# Patient Record
Sex: FEMALE | Race: BLACK OR AFRICAN AMERICAN | NOT HISPANIC OR LATINO
[De-identification: names, ages, dates, MRNs, and addresses within clinical notes are randomized per-mention and may not be internally consistent; named-entity substitution may affect disease eponyms.]

---

## 2019-05-13 PROBLEM — Z00.00 ENCOUNTER FOR PREVENTIVE HEALTH EXAMINATION: Status: ACTIVE | Noted: 2019-05-13

## 2019-05-31 ENCOUNTER — APPOINTMENT (OUTPATIENT)
Dept: NEUROLOGY | Facility: CLINIC | Age: 84
End: 2019-05-31
Payer: MEDICARE

## 2019-05-31 VITALS
DIASTOLIC BLOOD PRESSURE: 62 MMHG | HEIGHT: 62 IN | WEIGHT: 150 LBS | BODY MASS INDEX: 27.6 KG/M2 | OXYGEN SATURATION: 99 % | HEART RATE: 93 BPM | SYSTOLIC BLOOD PRESSURE: 123 MMHG | TEMPERATURE: 98.4 F

## 2019-05-31 DIAGNOSIS — Z86.73 PERSONAL HISTORY OF TRANSIENT ISCHEMIC ATTACK (TIA), AND CEREBRAL INFARCTION W/OUT RESIDUAL DEFICITS: ICD-10-CM

## 2019-05-31 DIAGNOSIS — Z86.39 PERSONAL HISTORY OF OTHER ENDOCRINE, NUTRITIONAL AND METABOLIC DISEASE: ICD-10-CM

## 2019-05-31 DIAGNOSIS — Z78.9 OTHER SPECIFIED HEALTH STATUS: ICD-10-CM

## 2019-05-31 DIAGNOSIS — Z86.79 PERSONAL HISTORY OF OTHER DISEASES OF THE CIRCULATORY SYSTEM: ICD-10-CM

## 2019-05-31 DIAGNOSIS — Z87.898 PERSONAL HISTORY OF OTHER SPECIFIED CONDITIONS: ICD-10-CM

## 2019-05-31 DIAGNOSIS — I63.9 CEREBRAL INFARCTION, UNSPECIFIED: ICD-10-CM

## 2019-05-31 PROCEDURE — 99205 OFFICE O/P NEW HI 60 MIN: CPT

## 2019-05-31 RX ORDER — IRBESARTAN 300 MG/1
TABLET ORAL
Refills: 0 | Status: ACTIVE | COMMUNITY

## 2019-05-31 RX ORDER — MEMANTINE HYDROCHLORIDE 10 MG/1
10 TABLET, FILM COATED ORAL
Refills: 0 | Status: ACTIVE | COMMUNITY

## 2019-05-31 RX ORDER — METFORMIN HYDROCHLORIDE 500 MG/1
500 TABLET, COATED ORAL
Refills: 0 | Status: ACTIVE | COMMUNITY

## 2019-05-31 RX ORDER — GLIPIZIDE 2.5 MG/1
TABLET ORAL
Refills: 0 | Status: ACTIVE | COMMUNITY

## 2019-05-31 RX ORDER — AMLODIPINE BESYLATE 5 MG/1
TABLET ORAL
Refills: 0 | Status: ACTIVE | COMMUNITY

## 2019-05-31 RX ORDER — ASPIRIN 81 MG
81 TABLET, DELAYED RELEASE (ENTERIC COATED) ORAL
Refills: 0 | Status: ACTIVE | COMMUNITY

## 2019-05-31 NOTE — PHYSICAL EXAM
[General Appearance - Alert] : alert [General Appearance - In No Acute Distress] : in no acute distress [Person] : oriented to person [Place] : oriented to place [Registration Intact] : recent registration memory intact [Concentration Intact] : normal concentrating ability [Visual Intact] : visual attention was ~T not ~L decreased [Repeating Phrases] : no difficulty repeating a phrase [Fluency] : fluency intact [Comprehension] : comprehension intact [Vocabulary] : adequate range of vocabulary [Cranial Nerves Optic (II)] : visual acuity intact bilaterally,  visual fields full to confrontation, pupils equal round and reactive to light [Cranial Nerves Oculomotor (III)] : extraocular motion intact [Cranial Nerves Trigeminal (V)] : facial sensation intact symmetrically [Cranial Nerves Facial (VII)] : face symmetrical [Cranial Nerves Vestibulocochlear (VIII)] : hearing was intact bilaterally [Cranial Nerves Glossopharyngeal (IX)] : tongue and palate midline [Cranial Nerves Accessory (XI - Cranial And Spinal)] : head turning and shoulder shrug symmetric [Cranial Nerves Hypoglossal (XII)] : there was no tongue deviation with protrusion [Motor Tone] : muscle tone was normal in all four extremities [Motor Strength] : muscle strength was normal in all four extremities [Involuntary Movements] : no involuntary movements were seen [Sensation Tactile Decrease] : light touch was intact [Abnormal Walk] : normal gait [Limited Balance] : the patient's balance was impaired [1+] : Ankle jerk left 1+ [Full Pulse] : the pedal pulses are present [Time] : disoriented to time [Short Term Intact] : short term memory impaired [Remote Intact] : remote memory impaired [Coordination - Dysmetria Impaired Finger-to-Nose Bilateral] : not present [Coordination - Dysmetria Impaired Heel-to-Shin Bilateral] : not present [Plantar Reflex Right Only] : normal on the right [Plantar Reflex Left Only] : normal on the left [FreeTextEntry5] : fundi not visualized

## 2019-05-31 NOTE — HISTORY OF PRESENT ILLNESS
[FreeTextEntry1] : The patient is here for evaluation of a stroke. In April 2019, she was noticed by the daughter to have right-sided weakness, she tilted to the right side, and had slurred speech. She also had problems with balance when walking at that time, she was falling towards the right. She was taken to [facility] in had stroke workup at that point, records aren't available at this time.\par \par There was no dizziness, double vision, blurry vision, difficulty with swallowing or language. There was no known numbness or tingling. The patient ambulated without a cane or walker prior to this event. She was noticed to have right-sided weakness and received PT, she currently uses a walker.  As per the daughter, the patient had cerebellar stroke.  There was also concern for a seizure, details unavailable from family, and the patient was started on Keppra.  No known seizures after hospital discharge.\par \par Additionally, the patient was started on Namenda beginning in April of 2019 (prior to the stroke) she was noticed to have difficulty with memory. She commonly repeats herself and does not remember things.

## 2019-05-31 NOTE — ASSESSMENT
[FreeTextEntry1] : CVA, cerebellar stroke right sided as per family\par cw asa and statin\par BP goal of normal\par LDL goal <70\par \par Seizure, possible on Keppra, unclear\par will get EEG\par cw Keppra for now\par \par Memory problems, likely dementia AD\par will get labs for reversible causes of dementia\par cw namenda for now\par \par Family will request records from [facility] in order to make informed decision on how to treat Ms. Martinez for her stroke and presumed seizure/s.\par

## 2019-05-31 NOTE — REVIEW OF SYSTEMS
[As Noted in HPI] : as noted in HPI [Muscle Weakness] : muscle weakness [Fever] : no fever [Anxiety] : no anxiety [Eyesight Problems] : no eyesight problems [Loss Of Hearing] : no hearing loss [Chest Pain] : no chest pain [Shortness Of Breath] : no shortness of breath [Constipation] : no constipation [Incontinence] : no incontinence [Joint Pain] : no joint pain [Itching] : no itching [Easy Bleeding] : no tendency for easy bleeding

## 2019-05-31 NOTE — CONSULT LETTER
[Dear  ___] : Dear  [unfilled], [Consult Letter:] : I had the pleasure of evaluating your patient, [unfilled]. [Please see my note below.] : Please see my note below. [Consult Closing:] : Thank you very much for allowing me to participate in the care of this patient.  If you have any questions, please do not hesitate to contact me. [Sincerely,] : Sincerely, [FreeTextEntry3] : Bia Osorio MD, MPH\par

## 2019-06-27 ENCOUNTER — OUTPATIENT (OUTPATIENT)
Dept: OUTPATIENT SERVICES | Facility: HOSPITAL | Age: 84
LOS: 1 days | End: 2019-06-27
Payer: COMMERCIAL

## 2019-06-27 ENCOUNTER — RESULT CHARGE (OUTPATIENT)
Age: 84
End: 2019-06-27

## 2019-06-27 ENCOUNTER — RESULT REVIEW (OUTPATIENT)
Age: 84
End: 2019-06-27

## 2019-06-27 DIAGNOSIS — R56.9 UNSPECIFIED CONVULSIONS: ICD-10-CM

## 2019-06-27 PROCEDURE — 95957 EEG DIGITAL ANALYSIS: CPT

## 2019-06-27 PROCEDURE — 95819 EEG AWAKE AND ASLEEP: CPT

## 2019-06-27 PROCEDURE — 95819 EEG AWAKE AND ASLEEP: CPT | Mod: 26

## 2019-06-27 NOTE — EEG REPORT - NS EEG TEXT BOX
[facility]  Report of Routine EEG     [facility]: [address], [address] [place] [address], Phone: [phone]  [facility]: [phone] [place] [address], Phone: [phone]  Office: [address] [facility], [facility] [address], [place] [address], Phone: [phone]    Patient Name: WILL JOSUE    Age: 89 year  : 1926  Patient ID: -, MRN #: -, Location: -  Referring Physician: DR MILIAN  EEG #: [id number]    Study Date: 2019		    Technical Information:					  On Instrument: -  Placement and Labeling of Electrodes:  The EEG was performed utilizing 20 channels referential EEG connections (coronal over temporal over parasagittal montage) using all standard 10-20 electrode placements with EKG.  Recording was at a sampling rate of 256 samples per second per channel.  Chase and seizure detection algorithms were utilized.    History:  Routine study..Performed bedside   Patient awake and alert  HV performed shortly and photic performed'  89 Y/O female presents with Seizure  Convulsions    Medication	  Keppra (Levetiracetam)	    Study Interpretation:    Findings: The background was continuous, spontaneously variable and reactive. During wakefulness, the posterior dominant rhythm consisted of symmetric, well-modulated 9 Hz activity, with amplitude to 30 uV, that attenuated to eye opening.  Low amplitude frontal beta was noted in wakefulness.    Background Slowing:  No generalized background slowing was present.    Focal Slowing:   None were present.    Sleep Background:  Drowsiness and stage II sleep were not recorded.    Other Non-Epileptiform Findings:  None were present.    Interictal Epileptiform Activity:   None were present.    Events:  Clinical events: None recorded.  Seizures: None recorded.    Activation Procedures:   Hyperventilation was performed and did not elicit any abnormalities.    Photic stimulation was performed and did not elicit any abnormalities.      Artifacts:  Intermittent myogenic and movement artifacts were noted.    ECG:  The heart rate on single channel ECG was predominantly between 70 and 80 BPM.    EEG Summary/Classification:  Normal EEG in the awake state.    EEG Impression/Clinical Correlate:    Normal EEG study.    No epileptic pattern or seizure seen.    No events concerning for seizure were captured during this study.    A repeat routine EEG preceded by sleep deprivation, or a long-term study (ambulatory EEG or Epilepsy Monitoring Unit stay) may be considered to help capture the drowsy and asleep states.        ________________________________________    Steve Garza MD  Attending Physician, [facility]  Report of Routine EEG     [facility]: [address], [address] [place] [address], Phone: [phone]  [facility]: [phone] [place] [address], Phone: [phone]  Office: 1 [facility], [facility] [address], [place] [address], Phone: [phone]    Patient Name: WILL JOSUE    Age: 89 year  : 1926  Patient ID: -, MRN #: [id number], Location: [facility]  Referring Physician: DR MILIAN  EEG #: [id number]    Study Date: 2019		    Technical Information:					  On Instrument: -  Placement and Labeling of Electrodes:  The EEG was performed utilizing 20 channels referential EEG connections (coronal over temporal over parasagittal montage) using all standard 10-20 electrode placements with EKG.  Recording was at a sampling rate of 256 samples per second per channel.  Chase and seizure detection algorithms were utilized.    History:  Routine study..Performed bedside   Patient awake and alert  HV performed shortly and photic performed'  89 Y/O female presents with Seizure  Convulsions    Medication	  Keppra (Levetiracetam)	    Study Interpretation:    Findings: The background was continuous, spontaneously variable and reactive. During wakefulness, the posterior dominant rhythm consisted of symmetric, well-modulated 9 Hz activity, with amplitude to 30 uV, that attenuated to eye opening.  Low amplitude frontal beta was noted in wakefulness.    Background Slowing:  No generalized background slowing was present.    Focal Slowing:   None were present.    Sleep Background:  Drowsiness and stage II sleep were not recorded.    Other Non-Epileptiform Findings:  None were present.    Interictal Epileptiform Activity:   None were present.    Events:  Clinical events: None recorded.  Seizures: None recorded.    Activation Procedures:   Hyperventilation was performed and did not elicit any abnormalities.    Photic stimulation was performed and did not elicit any abnormalities.      Artifacts:  Intermittent myogenic and movement artifacts were noted.    ECG:  The heart rate on single channel ECG was predominantly between 70 and 80 BPM.    EEG Summary/Classification:  Normal EEG in the awake state.    EEG Impression/Clinical Correlate:    Normal EEG study.    No epileptic pattern or seizure seen.    No events concerning for seizure were captured during this study.    A repeat routine EEG preceded by sleep deprivation, or a long-term study (ambulatory EEG or Epilepsy Monitoring Unit stay) may be considered to help capture the drowsy and asleep states.        ________________________________________    Steve Garza MD  Attending Physician, [facility] [place]

## 2019-07-10 ENCOUNTER — APPOINTMENT (OUTPATIENT)
Dept: NEUROLOGY | Facility: CLINIC | Age: 84
End: 2019-07-10
Payer: MEDICARE

## 2019-07-10 VITALS
BODY MASS INDEX: 27.6 KG/M2 | SYSTOLIC BLOOD PRESSURE: 125 MMHG | HEIGHT: 62 IN | TEMPERATURE: 98 F | WEIGHT: 150 LBS | OXYGEN SATURATION: 98 % | HEART RATE: 87 BPM | DIASTOLIC BLOOD PRESSURE: 71 MMHG

## 2019-07-10 PROCEDURE — 99214 OFFICE O/P EST MOD 30 MIN: CPT

## 2019-07-10 RX ORDER — LEVETIRACETAM 500 MG/1
500 TABLET, FILM COATED ORAL TWICE DAILY
Qty: 60 | Refills: 2 | Status: ACTIVE | COMMUNITY

## 2019-07-10 RX ORDER — ATORVASTATIN CALCIUM 40 MG/1
40 TABLET, FILM COATED ORAL
Refills: 0 | Status: ACTIVE | COMMUNITY

## 2019-07-10 NOTE — HISTORY OF PRESENT ILLNESS
[FreeTextEntry1] : The patient is here for evaluation of a stroke. In April 2019, she was noticed by the daughter to have right-sided weakness, she tilted to the right side, and had slurred speech. She also had problems with balance when walking at that time, she was falling towards the right. She was taken to [facility] in had stroke workup at that point, records aren't available at this time.\par \par There was no dizziness, double vision, blurry vision, difficulty with swallowing or language. There was no known numbness or tingling. The patient ambulated without a cane or walker prior to this event. She was noticed to have right-sided weakness and received PT, she currently uses a walker. As per the daughter, the patient had cerebellar stroke. There was also concern for a seizure, details unavailable from family, and the patient was started on Keppra. No known seizures after hospital discharge.\par \par Additionally, the patient was started on Namenda beginning in April of 2019 (prior to the stroke) she was noticed to have difficulty with memory. She commonly repeats herself and does not remember things. \par \par No clinical change since last visit.

## 2019-07-10 NOTE — DATA REVIEWED
[de-identified] : EEG Impression/Clinical Correlate:\par \par Normal EEG study.\par \par No epileptic pattern or seizure seen.\par \par No events concerning for seizure were captured during this study.\par \par A repeat routine EEG preceded by sleep deprivation, or a long-term study (ambulatory EEG or Epilepsy Monitoring Unit stay) may be considered to help capture the drowsy and asleep states.\par  [de-identified] : hbA1C 7.4\par ldl 62

## 2019-07-10 NOTE — ASSESSMENT
[FreeTextEntry1] : CVA, cerebellar stroke right sided as per family by no records\par cw asa and statin\par BP goal of normal\par LDL at goal <70\par \par Seizure, possible on Keppra, unclear\par will get EEG\par cw Keppra for now\par \par Memory problems, likely dementia AD\par will get labs for reversible causes of dementia\par cw namenda for now\par \par Family will request records from [facility] in order to make informed decision on how to treat Ms. Martinez for her stroke and presumed seizure/s.\par  \par

## 2019-07-10 NOTE — PHYSICAL EXAM
[General Appearance - Alert] : alert [General Appearance - In No Acute Distress] : in no acute distress [Person] : oriented to person [Place] : oriented to place [Time] : oriented to time [Registration Intact] : recent registration memory intact [Concentration Intact] : normal concentrating ability [Visual Intact] : visual attention was ~T not ~L decreased [Naming Objects] : no difficulty naming common objects [Repeating Phrases] : no difficulty repeating a phrase [Fluency] : fluency intact [Comprehension] : comprehension intact [Vocabulary] : adequate range of vocabulary [Cranial Nerves Oculomotor (III)] : extraocular motion intact [Cranial Nerves Optic (II)] : visual acuity intact bilaterally,  visual fields full to confrontation, pupils equal round and reactive to light [Cranial Nerves Trigeminal (V)] : facial sensation intact symmetrically [Cranial Nerves Facial (VII)] : face symmetrical [Motor Tone] : muscle tone was normal in all four extremities [Sensation Tactile Decrease] : light touch was intact [Motor Strength] : muscle strength was normal in all four extremities [Limited Balance] : the patient's balance was impaired

## 2019-09-05 ENCOUNTER — APPOINTMENT (OUTPATIENT)
Dept: NEUROLOGY | Facility: CLINIC | Age: 84
End: 2019-09-05
Payer: MEDICARE

## 2019-09-05 VITALS
HEIGHT: 62 IN | BODY MASS INDEX: 26.87 KG/M2 | HEART RATE: 106 BPM | WEIGHT: 146 LBS | DIASTOLIC BLOOD PRESSURE: 78 MMHG | SYSTOLIC BLOOD PRESSURE: 139 MMHG | OXYGEN SATURATION: 97 %

## 2019-09-05 DIAGNOSIS — F03.90 UNSPECIFIED DEMENTIA W/OUT BEHAVIORAL DISTURBANCE: ICD-10-CM

## 2019-09-05 DIAGNOSIS — R56.9 UNSPECIFIED CONVULSIONS: ICD-10-CM

## 2019-09-05 DIAGNOSIS — I63.9 CEREBRAL INFARCTION, UNSPECIFIED: ICD-10-CM

## 2019-09-05 PROCEDURE — 99214 OFFICE O/P EST MOD 30 MIN: CPT

## 2019-09-05 NOTE — ASSESSMENT
[FreeTextEntry1] : CVA, cerebellar stroke right sided\par cw asa and statin\par BP goal of normal\par LDL at goal <70\par PT\par \par Seizure, possible on Keppra\par cw Keppra for now\par sz/fall ppx\par patient does not drive\par \par Memory problems, likely dementia AD\par cw namenda for now\par

## 2019-09-05 NOTE — HISTORY OF PRESENT ILLNESS
[FreeTextEntry1] : The patient is here for evaluation of a stroke. In April 2019, she was noticed by the daughter to have right-sided weakness, she tilted to the right side, and had slurred speech. She also had problems with balance when walking at that time, she was falling towards the right. She was taken to [facility] in had stroke workup at that point, records aren't available at this time.\par \par There was no dizziness, double vision, blurry vision, difficulty with swallowing or language. There was no known numbness or tingling. The patient ambulated without a cane or walker prior to this event. She was noticed to have right-sided weakness and received PT, she currently uses a walker. As per the daughter, the patient had cerebellar stroke. There was also concern for a seizure, details unavailable from family, and the patient was started on Keppra. No known seizures after hospital discharge.\par \par Additionally, the patient was started on Namenda beginning in April of 2019 (prior to the stroke) she was noticed to have difficulty with memory. She commonly repeats herself and does not remember things. \par \par No clinical change since last visit. \par

## 2019-09-05 NOTE — PHYSICAL EXAM
[General Appearance - Alert] : alert [Person] : oriented to person [General Appearance - In No Acute Distress] : in no acute distress [Place] : oriented to place [Registration Intact] : recent registration memory intact [Concentration Intact] : normal concentrating ability [Naming Objects] : no difficulty naming common objects [Fluency] : fluency intact [Vocabulary] : adequate range of vocabulary [Cranial Nerves Trigeminal (V)] : facial sensation intact symmetrically [Cranial Nerves Optic (II)] : visual acuity intact bilaterally,  visual fields full to confrontation, pupils equal round and reactive to light [Cranial Nerves Oculomotor (III)] : extraocular motion intact [Cranial Nerves Facial (VII)] : face symmetrical [Motor Tone] : muscle tone was normal in all four extremities [Motor Strength] : muscle strength was normal in all four extremities [Sensation Tactile Decrease] : light touch was intact [Time] : disoriented to time [Remote Intact] : remote memory impaired [Coordination - Dysmetria Impaired Finger-to-Nose Bilateral] : not present [Coordination - Dysmetria Impaired Heel-to-Shin Bilateral] : not present

## 2019-12-09 ENCOUNTER — INPATIENT (INPATIENT)
Facility: HOSPITAL | Age: 84
LOS: 14 days | Discharge: NURSING, REHAB OR LONG-TERM CARE | End: 2019-12-24
Attending: INTERNAL MEDICINE | Admitting: INTERNAL MEDICINE
Payer: COMMERCIAL

## 2019-12-09 VITALS
HEART RATE: 100 BPM | OXYGEN SATURATION: 100 % | SYSTOLIC BLOOD PRESSURE: 121 MMHG | HEIGHT: 62 IN | WEIGHT: 145.06 LBS | TEMPERATURE: 98 F | DIASTOLIC BLOOD PRESSURE: 68 MMHG | RESPIRATION RATE: 18 BRPM

## 2019-12-09 LAB
ALBUMIN SERPL ELPH-MCNC: 2.8 G/DL — LOW (ref 3.3–5)
ALP SERPL-CCNC: 103 U/L — SIGNIFICANT CHANGE UP (ref 40–120)
ALT FLD-CCNC: 23 U/L — SIGNIFICANT CHANGE UP (ref 12–78)
ANION GAP SERPL CALC-SCNC: 9 MMOL/L — SIGNIFICANT CHANGE UP (ref 5–17)
APPEARANCE UR: CLEAR — SIGNIFICANT CHANGE UP
APTT BLD: 31.3 SEC — SIGNIFICANT CHANGE UP (ref 27.5–36.3)
AST SERPL-CCNC: 26 U/L — SIGNIFICANT CHANGE UP (ref 15–37)
BACTERIA # UR AUTO: ABNORMAL
BASOPHILS # BLD AUTO: 0.02 K/UL — SIGNIFICANT CHANGE UP (ref 0–0.2)
BASOPHILS NFR BLD AUTO: 0.3 % — SIGNIFICANT CHANGE UP (ref 0–2)
BILIRUB SERPL-MCNC: 0.2 MG/DL — SIGNIFICANT CHANGE UP (ref 0.2–1.2)
BILIRUB UR-MCNC: NEGATIVE — SIGNIFICANT CHANGE UP
BUN SERPL-MCNC: 40 MG/DL — HIGH (ref 7–23)
CALCIUM SERPL-MCNC: 11.8 MG/DL — HIGH (ref 8.5–10.1)
CHLORIDE SERPL-SCNC: 107 MMOL/L — SIGNIFICANT CHANGE UP (ref 96–108)
CO2 SERPL-SCNC: 23 MMOL/L — SIGNIFICANT CHANGE UP (ref 22–31)
COLOR SPEC: YELLOW — SIGNIFICANT CHANGE UP
CREAT SERPL-MCNC: 1.58 MG/DL — HIGH (ref 0.5–1.3)
DIFF PNL FLD: ABNORMAL
EOSINOPHIL # BLD AUTO: 0.12 K/UL — SIGNIFICANT CHANGE UP (ref 0–0.5)
EOSINOPHIL NFR BLD AUTO: 1.7 % — SIGNIFICANT CHANGE UP (ref 0–6)
GLUCOSE BLDC GLUCOMTR-MCNC: 118 MG/DL — HIGH (ref 70–99)
GLUCOSE SERPL-MCNC: 103 MG/DL — HIGH (ref 70–99)
GLUCOSE UR QL: NEGATIVE MG/DL — SIGNIFICANT CHANGE UP
HCT VFR BLD CALC: 26.8 % — LOW (ref 34.5–45)
HGB BLD-MCNC: 8.6 G/DL — LOW (ref 11.5–15.5)
IMM GRANULOCYTES NFR BLD AUTO: 0.4 % — SIGNIFICANT CHANGE UP (ref 0–1.5)
INR BLD: 1.11 RATIO — SIGNIFICANT CHANGE UP (ref 0.88–1.16)
KETONES UR-MCNC: NEGATIVE — SIGNIFICANT CHANGE UP
LACTATE SERPL-SCNC: 1.1 MMOL/L — SIGNIFICANT CHANGE UP (ref 0.7–2)
LEUKOCYTE ESTERASE UR-ACNC: ABNORMAL
LYMPHOCYTES # BLD AUTO: 1.02 K/UL — SIGNIFICANT CHANGE UP (ref 1–3.3)
LYMPHOCYTES # BLD AUTO: 14.5 % — SIGNIFICANT CHANGE UP (ref 13–44)
MCHC RBC-ENTMCNC: 25.4 PG — LOW (ref 27–34)
MCHC RBC-ENTMCNC: 32.1 GM/DL — SIGNIFICANT CHANGE UP (ref 32–36)
MCV RBC AUTO: 79.1 FL — LOW (ref 80–100)
MONOCYTES # BLD AUTO: 1.06 K/UL — HIGH (ref 0–0.9)
MONOCYTES NFR BLD AUTO: 15.1 % — HIGH (ref 2–14)
NEUTROPHILS # BLD AUTO: 4.78 K/UL — SIGNIFICANT CHANGE UP (ref 1.8–7.4)
NEUTROPHILS NFR BLD AUTO: 68 % — SIGNIFICANT CHANGE UP (ref 43–77)
NITRITE UR-MCNC: NEGATIVE — SIGNIFICANT CHANGE UP
NRBC # BLD: 0 /100 WBCS — SIGNIFICANT CHANGE UP (ref 0–0)
PH UR: 7 — SIGNIFICANT CHANGE UP (ref 5–8)
PLATELET # BLD AUTO: 209 K/UL — SIGNIFICANT CHANGE UP (ref 150–400)
POTASSIUM SERPL-MCNC: 4.3 MMOL/L — SIGNIFICANT CHANGE UP (ref 3.5–5.3)
POTASSIUM SERPL-SCNC: 4.3 MMOL/L — SIGNIFICANT CHANGE UP (ref 3.5–5.3)
PROT SERPL-MCNC: 6.7 GM/DL — SIGNIFICANT CHANGE UP (ref 6–8.3)
PROT UR-MCNC: 100 MG/DL
PROTHROM AB SERPL-ACNC: 12.5 SEC — SIGNIFICANT CHANGE UP (ref 10–12.9)
RBC # BLD: 3.39 M/UL — LOW (ref 3.8–5.2)
RBC # FLD: 15.5 % — HIGH (ref 10.3–14.5)
RBC CASTS # UR COMP ASSIST: SIGNIFICANT CHANGE UP /HPF (ref 0–4)
SODIUM SERPL-SCNC: 139 MMOL/L — SIGNIFICANT CHANGE UP (ref 135–145)
SP GR SPEC: 1.01 — SIGNIFICANT CHANGE UP (ref 1.01–1.02)
TROPONIN I SERPL-MCNC: <.015 NG/ML — SIGNIFICANT CHANGE UP (ref 0.01–0.04)
UROBILINOGEN FLD QL: NEGATIVE MG/DL — SIGNIFICANT CHANGE UP
WBC # BLD: 7.03 K/UL — SIGNIFICANT CHANGE UP (ref 3.8–10.5)
WBC # FLD AUTO: 7.03 K/UL — SIGNIFICANT CHANGE UP (ref 3.8–10.5)
WBC UR QL: ABNORMAL

## 2019-12-09 PROCEDURE — 99285 EMERGENCY DEPT VISIT HI MDM: CPT

## 2019-12-09 PROCEDURE — 93010 ELECTROCARDIOGRAM REPORT: CPT

## 2019-12-09 RX ORDER — ATORVASTATIN CALCIUM 80 MG/1
1 TABLET, FILM COATED ORAL
Qty: 0 | Refills: 0 | DISCHARGE

## 2019-12-09 RX ORDER — ALLOPURINOL 300 MG
1 TABLET ORAL
Qty: 0 | Refills: 0 | DISCHARGE

## 2019-12-09 RX ORDER — MEMANTINE HYDROCHLORIDE 10 MG/1
1 TABLET ORAL
Qty: 0 | Refills: 0 | DISCHARGE

## 2019-12-09 RX ORDER — LEVETIRACETAM 250 MG/1
1 TABLET, FILM COATED ORAL
Qty: 0 | Refills: 0 | DISCHARGE

## 2019-12-09 NOTE — ED ADULT NURSE NOTE - PMH
CVA (cerebral vascular accident)  april 2019  DM (diabetes mellitus)    Gout    HTN (hypertension)    Hyperlipidemia

## 2019-12-09 NOTE — ED ADULT NURSE NOTE - CHIEF COMPLAINT QUOTE
pt with changes in mental status over the last couple of weeks. diarrhea today. family reports episodes of weakness since this morning. has been seen at [facility] x 2  over the last two weeks and discharged home. ems accucheck 102. hx. htn, dm, high cholesterol, seizures, gout

## 2019-12-09 NOTE — ED PROVIDER NOTE - OBJECTIVE STATEMENT
Pertinent PMH/PSH/FHx/SHx and Review of Systems contained within:    89yo F w PMH of HTN, DM, HL, seizure d/o, gout presents to ED for eval of increasing weakness and change in mental status over the past few weeks. Pertinent PMH/PSH/FHx/SHx and Review of Systems contained within:    89yo F w PMH of HTN, DM, HL, seizure d/o, gout presents to ED for eval of increasing weakness and change in mental status over the past few weeks.  Family states some days pt is at her baseline, then other times she is more lethargic, noted drooling.  Family states pt taken to outside hospital twice for sx, work up neg & dc home.  Family states pt has been compliant w her meds, no known grand mal seizures.  No fall/head trauma.  Pt currently denies pain.    No fever/chills, No photophobia/eye pain/changes in vision, No ear pain/sore throat/dysphagia, No chest pain/palpitations, no SOB/cough/wheeze/stridor, No abdominal pain, No neck/back pain, no rash, no changes in neurological status/function

## 2019-12-09 NOTE — ED PROVIDER NOTE - NS ED ROS FT
Patient seen and examined.  No complaints, denies VB/LOF/ reports Ctxns overnight, reports good fetal movement. Precautions for Bleeding/ ROM/ Decreased fetal movement/ Pre-E reviewed.  F/U scheduled 1 weeks     all other ROS negative except as per HPI

## 2019-12-09 NOTE — ED ADULT TRIAGE NOTE - CHIEF COMPLAINT QUOTE
pt with changes in mental status over the last couple of weeks. diarrhea today. family reports episodes of weakness since this morning. has been seen at [place] x 2  over the last two weeks and discharged home. ems accucheck 102. hx. htn, dm, high cholesterol, seizures, gout

## 2019-12-09 NOTE — ED PROVIDER NOTE - PHYSICAL EXAMINATION
Gen: Alert, NAD  Head: NC, AT, EOMI, normal lids/conjunctiva  ENT: normal hearing, patent oropharynx, MMM  Neck: supple, no tenderness/meningismus, FROM, Trachea midline  Pulm: Bilateral clear BS, normal resp effort, no wheeze/stridor/retractions  CV: RRR, no M/R/G, +dist pulses  Abd: soft, NT/ND, +BS, no guarding/rebound tenderness  Mskel: no edema/erythema/cyanosis  Skin: no rash  Neuro: no sensory/motor deficits, NIHSS 0

## 2019-12-10 DIAGNOSIS — I10 ESSENTIAL (PRIMARY) HYPERTENSION: ICD-10-CM

## 2019-12-10 DIAGNOSIS — N39.0 URINARY TRACT INFECTION, SITE NOT SPECIFIED: ICD-10-CM

## 2019-12-10 DIAGNOSIS — E86.0 DEHYDRATION: ICD-10-CM

## 2019-12-10 DIAGNOSIS — G93.41 METABOLIC ENCEPHALOPATHY: ICD-10-CM

## 2019-12-10 DIAGNOSIS — N17.9 ACUTE KIDNEY FAILURE, UNSPECIFIED: ICD-10-CM

## 2019-12-10 DIAGNOSIS — E83.52 HYPERCALCEMIA: ICD-10-CM

## 2019-12-10 LAB
24R-OH-CALCIDIOL SERPL-MCNC: 31.3 NG/ML — SIGNIFICANT CHANGE UP (ref 30–80)
ANION GAP SERPL CALC-SCNC: 12 MMOL/L — SIGNIFICANT CHANGE UP (ref 5–17)
BUN SERPL-MCNC: 34 MG/DL — HIGH (ref 7–23)
CALCIUM SERPL-MCNC: 11.8 MG/DL — HIGH (ref 8.5–10.1)
CALCIUM SERPL-MCNC: 12.2 MG/DL — HIGH (ref 8.4–10.5)
CHLORIDE SERPL-SCNC: 107 MMOL/L — SIGNIFICANT CHANGE UP (ref 96–108)
CO2 SERPL-SCNC: 22 MMOL/L — SIGNIFICANT CHANGE UP (ref 22–31)
CREAT SERPL-MCNC: 1.32 MG/DL — HIGH (ref 0.5–1.3)
GLUCOSE BLDC GLUCOMTR-MCNC: 111 MG/DL — HIGH (ref 70–99)
GLUCOSE BLDC GLUCOMTR-MCNC: 154 MG/DL — HIGH (ref 70–99)
GLUCOSE BLDC GLUCOMTR-MCNC: 213 MG/DL — HIGH (ref 70–99)
GLUCOSE SERPL-MCNC: 122 MG/DL — HIGH (ref 70–99)
PHOSPHATE SERPL-MCNC: 3.4 MG/DL — SIGNIFICANT CHANGE UP (ref 2.5–4.5)
POTASSIUM SERPL-MCNC: 3.5 MMOL/L — SIGNIFICANT CHANGE UP (ref 3.5–5.3)
POTASSIUM SERPL-SCNC: 3.5 MMOL/L — SIGNIFICANT CHANGE UP (ref 3.5–5.3)
PTH-INTACT FLD-MCNC: 4 PG/ML — LOW (ref 15–65)
SODIUM SERPL-SCNC: 141 MMOL/L — SIGNIFICANT CHANGE UP (ref 135–145)
VIT D25+D1,25 OH+D1,25 PNL SERPL-MCNC: 69.9 PG/ML — SIGNIFICANT CHANGE UP (ref 19.9–79.3)

## 2019-12-10 PROCEDURE — 12345: CPT | Mod: NC

## 2019-12-10 PROCEDURE — 99223 1ST HOSP IP/OBS HIGH 75: CPT

## 2019-12-10 PROCEDURE — 71045 X-RAY EXAM CHEST 1 VIEW: CPT | Mod: 26

## 2019-12-10 PROCEDURE — 88305 TISSUE EXAM BY PATHOLOGIST: CPT | Mod: 26

## 2019-12-10 PROCEDURE — 70450 CT HEAD/BRAIN W/O DYE: CPT | Mod: 26

## 2019-12-10 PROCEDURE — 88312 SPECIAL STAINS GROUP 1: CPT | Mod: 26

## 2019-12-10 RX ORDER — CEFTRIAXONE 500 MG/1
1000 INJECTION, POWDER, FOR SOLUTION INTRAMUSCULAR; INTRAVENOUS EVERY 24 HOURS
Refills: 0 | Status: COMPLETED | OUTPATIENT
Start: 2019-12-10 | End: 2019-12-11

## 2019-12-10 RX ORDER — AMLODIPINE BESYLATE 2.5 MG/1
2.5 TABLET ORAL DAILY
Refills: 0 | Status: DISCONTINUED | OUTPATIENT
Start: 2019-12-10 | End: 2019-12-13

## 2019-12-10 RX ORDER — HEPARIN SODIUM 5000 [USP'U]/ML
5000 INJECTION INTRAVENOUS; SUBCUTANEOUS EVERY 12 HOURS
Refills: 0 | Status: DISCONTINUED | OUTPATIENT
Start: 2019-12-10 | End: 2019-12-20

## 2019-12-10 RX ORDER — DEXTROSE 50 % IN WATER 50 %
12.5 SYRINGE (ML) INTRAVENOUS ONCE
Refills: 0 | Status: DISCONTINUED | OUTPATIENT
Start: 2019-12-10 | End: 2019-12-24

## 2019-12-10 RX ORDER — DEXTROSE 50 % IN WATER 50 %
15 SYRINGE (ML) INTRAVENOUS ONCE
Refills: 0 | Status: DISCONTINUED | OUTPATIENT
Start: 2019-12-10 | End: 2019-12-24

## 2019-12-10 RX ORDER — INSULIN LISPRO 100/ML
VIAL (ML) SUBCUTANEOUS
Refills: 0 | Status: DISCONTINUED | OUTPATIENT
Start: 2019-12-10 | End: 2019-12-24

## 2019-12-10 RX ORDER — ASPIRIN/CALCIUM CARB/MAGNESIUM 324 MG
81 TABLET ORAL DAILY
Refills: 0 | Status: DISCONTINUED | OUTPATIENT
Start: 2019-12-10 | End: 2019-12-13

## 2019-12-10 RX ORDER — SODIUM CHLORIDE 9 MG/ML
1000 INJECTION INTRAMUSCULAR; INTRAVENOUS; SUBCUTANEOUS
Refills: 0 | Status: DISCONTINUED | OUTPATIENT
Start: 2019-12-10 | End: 2019-12-13

## 2019-12-10 RX ORDER — AMLODIPINE BESYLATE 2.5 MG/1
2.5 TABLET ORAL ONCE
Refills: 0 | Status: COMPLETED | OUTPATIENT
Start: 2019-12-10 | End: 2019-12-10

## 2019-12-10 RX ORDER — CEFTRIAXONE 500 MG/1
1000 INJECTION, POWDER, FOR SOLUTION INTRAMUSCULAR; INTRAVENOUS ONCE
Refills: 0 | Status: COMPLETED | OUTPATIENT
Start: 2019-12-10 | End: 2019-12-10

## 2019-12-10 RX ORDER — METOPROLOL TARTRATE 50 MG
12.5 TABLET ORAL ONCE
Refills: 0 | Status: COMPLETED | OUTPATIENT
Start: 2019-12-10 | End: 2019-12-10

## 2019-12-10 RX ORDER — DEXTROSE 50 % IN WATER 50 %
25 SYRINGE (ML) INTRAVENOUS ONCE
Refills: 0 | Status: DISCONTINUED | OUTPATIENT
Start: 2019-12-10 | End: 2019-12-24

## 2019-12-10 RX ORDER — ACETAMINOPHEN 500 MG
650 TABLET ORAL ONCE
Refills: 0 | Status: COMPLETED | OUTPATIENT
Start: 2019-12-10 | End: 2019-12-10

## 2019-12-10 RX ORDER — SODIUM CHLORIDE 9 MG/ML
1000 INJECTION INTRAMUSCULAR; INTRAVENOUS; SUBCUTANEOUS ONCE
Refills: 0 | Status: COMPLETED | OUTPATIENT
Start: 2019-12-10 | End: 2019-12-10

## 2019-12-10 RX ORDER — GLUCAGON INJECTION, SOLUTION 0.5 MG/.1ML
1 INJECTION, SOLUTION SUBCUTANEOUS ONCE
Refills: 0 | Status: DISCONTINUED | OUTPATIENT
Start: 2019-12-10 | End: 2019-12-24

## 2019-12-10 RX ORDER — LEVETIRACETAM 250 MG/1
500 TABLET, FILM COATED ORAL
Refills: 0 | Status: DISCONTINUED | OUTPATIENT
Start: 2019-12-10 | End: 2019-12-16

## 2019-12-10 RX ORDER — SODIUM CHLORIDE 9 MG/ML
1000 INJECTION, SOLUTION INTRAVENOUS
Refills: 0 | Status: DISCONTINUED | OUTPATIENT
Start: 2019-12-10 | End: 2019-12-24

## 2019-12-10 RX ORDER — ATORVASTATIN CALCIUM 80 MG/1
40 TABLET, FILM COATED ORAL AT BEDTIME
Refills: 0 | Status: DISCONTINUED | OUTPATIENT
Start: 2019-12-10 | End: 2019-12-24

## 2019-12-10 RX ADMIN — Medication 2: at 16:00

## 2019-12-10 RX ADMIN — LEVETIRACETAM 500 MILLIGRAM(S): 250 TABLET, FILM COATED ORAL at 17:08

## 2019-12-10 RX ADMIN — AMLODIPINE BESYLATE 2.5 MILLIGRAM(S): 2.5 TABLET ORAL at 22:09

## 2019-12-10 RX ADMIN — Medication 650 MILLIGRAM(S): at 23:22

## 2019-12-10 RX ADMIN — Medication 81 MILLIGRAM(S): at 11:04

## 2019-12-10 RX ADMIN — Medication 12.5 MILLIGRAM(S): at 23:57

## 2019-12-10 RX ADMIN — Medication 650 MILLIGRAM(S): at 22:09

## 2019-12-10 RX ADMIN — ATORVASTATIN CALCIUM 40 MILLIGRAM(S): 80 TABLET, FILM COATED ORAL at 21:22

## 2019-12-10 RX ADMIN — CEFTRIAXONE 100 MILLIGRAM(S): 500 INJECTION, POWDER, FOR SOLUTION INTRAMUSCULAR; INTRAVENOUS at 07:43

## 2019-12-10 RX ADMIN — CEFTRIAXONE 100 MILLIGRAM(S): 500 INJECTION, POWDER, FOR SOLUTION INTRAMUSCULAR; INTRAVENOUS at 04:15

## 2019-12-10 RX ADMIN — AMLODIPINE BESYLATE 2.5 MILLIGRAM(S): 2.5 TABLET ORAL at 11:04

## 2019-12-10 RX ADMIN — HEPARIN SODIUM 5000 UNIT(S): 5000 INJECTION INTRAVENOUS; SUBCUTANEOUS at 17:08

## 2019-12-10 RX ADMIN — SODIUM CHLORIDE 250 MILLILITER(S): 9 INJECTION INTRAMUSCULAR; INTRAVENOUS; SUBCUTANEOUS at 07:25

## 2019-12-10 NOTE — PROGRESS NOTE ADULT - ASSESSMENT
89 y/o Female PMH of HTN, DM2, HL, seizure d/o, gout presents to ED for eval of increasing weakness and change in mental status over the past few weeks.  Family states some days pt is at her baseline, then other times she is more lethargic, noted drooling.  Family states pt taken to outside hospital twice for sx, work up neg & dc home.  Family states pt has been compliant w her meds, no known grand mal seizures.  No fall/head trauma.  Pt currently denies pain, denies all complaints; she is poor historian, on memantine, possible dementia. She is clinically dehydrated with elevated creatinine, has hypercalcemia, likely metabolic/septic encephalopathy.  IMPROVE VTE Individual Risk Assessment          RISK                                                          Points    [  ] Previous VTE                                                3    [  ] Thrombophilia                                             2    [  ] Lower limb paralysis                                    2        (unable to hold up >15 seconds)      [  ] Current Cancer                                             2         (within 6 months)    [  ] Immobilization > 24 hrs                              1    [  ] ICU/CCU stay > 24 hours                            1    [x  ] Age > 60                                                    1    IMPROVE VTE Score __1_______

## 2019-12-10 NOTE — H&P ADULT - ASSESSMENT
89 y/o Female PMH of HTN, DM2, HL, seizure d/o, gout presents to ED for eval of increasing weakness and change in mental status over the past few weeks.  Family states some days pt is at her baseline, then other times she is more lethargic, noted drooling.  Family states pt taken to outside hospital twice for sx, work up neg & dc home.  Family states pt has been compliant w her meds, no known grand mal seizures.  No fall/head trauma.  Pt currently denies pain, denies all complaints; she is poor historian, on memantine, possible dementia. She is clinically dehydrated with elevated creatinine, has hypercalcemia, likely metabolic/septic encephalopathy.  IMPROVE VTE Individual Risk Assessment          RISK                                                          Points    [  ] Previous VTE                                                3    [  ] Thrombophilia                                             2    [  ] Lower limb paralysis                                    2        (unable to hold up >15 seconds)      [  ] Current Cancer                                             2         (within 6 months)    [  ] Immobilization > 24 hrs                              1    [  ] ICU/CCU stay > 24 hours                            1    [x  ] Age > 60                                                    1    IMPROVE VTE Score __1_______

## 2019-12-10 NOTE — H&P ADULT - NSHPREVIEWOFSYSTEMS_GEN_ALL_CORE
No fever/chills, No photophobia/eye pain/changes in vision, No ear pain/sore throat/dysphagia, No chest pain/palpitations, no SOB/cough/wheeze/stridor, No abdominal pain, No neck/back pain, no rash, no changes in neurological status/function

## 2019-12-10 NOTE — PROGRESS NOTE ADULT - SUBJECTIVE AND OBJECTIVE BOX
Patient is a 89y old  Female who presents with a chief complaint of Weakness. (10 Dec 2019 06:42)      OVERNIGHT EVENTS:      REVIEW OF SYSTEMS: denies chest pain/SOB, diaphoresis, no F/C, cough, dizziness, headache, blurry vision, nausea, vomiting, abdominal pain. Rest unremarkable     MEDICATIONS  (STANDING):  amLODIPine   Tablet 2.5 milliGRAM(s) Oral daily  aspirin enteric coated 81 milliGRAM(s) Oral daily  atorvastatin 40 milliGRAM(s) Oral at bedtime  cefTRIAXone   IVPB 1000 milliGRAM(s) IV Intermittent every 24 hours  dextrose 5%. 1000 milliLiter(s) (50 mL/Hr) IV Continuous <Continuous>  dextrose 50% Injectable 12.5 Gram(s) IV Push once  dextrose 50% Injectable 25 Gram(s) IV Push once  dextrose 50% Injectable 25 Gram(s) IV Push once  heparin  Injectable 5000 Unit(s) SubCutaneous every 12 hours  insulin lispro (HumaLOG) corrective regimen sliding scale   SubCutaneous three times a day before meals  levETIRAcetam 500 milliGRAM(s) Oral two times a day  sodium chloride 0.9%. 1000 milliLiter(s) (100 mL/Hr) IV Continuous <Continuous>    MEDICATIONS  (PRN):  dextrose 40% Gel 15 Gram(s) Oral once PRN Blood Glucose LESS THAN 70 milliGRAM(s)/deciliter  glucagon  Injectable 1 milliGRAM(s) IntraMuscular once PRN Glucose LESS THAN 70 milligrams/deciliter      Allergies    No Known Allergies    Intolerances        SUBJECTIVE: in bed in NAD, no acute events overnight     T(F): 98 (12-10-19 @ 06:40), Max: 98 (19 @ 20:24)  HR: 95 (12-10-19 @ 06:40) (95 - 100)  BP: 158/75 (12-10-19 @ 06:40) (120/70 - 163/74)  RR: 18 (12-10-19 @ 06:40) (17 - 18)  SpO2: 98% (12-10-19 @ 06:40) (98% - 100%)  Wt(kg): --    PHYSICAL EXAM:  GENERAL: NAD, well-groomed, well-developed  HEAD:  Atraumatic, Normocephalic  EYES: EOMI, PERRLA, conjunctiva and sclera clear  ENMT: No tonsillar erythema, exudates, or enlargement; Moist mucous membranes, Good dentition, No lesions  NECK: Supple, No JVD, Normal thyroid  CHEST/LUNG: Clear to  auscultation bilaterally; No rales, rhonchi, wheezing, or rubs  bilaterally  HEART: Regular rate and rhythm; No murmurs, rubs, or gallops  ABDOMEN: Soft, Nontender, Nondistended; Bowel sounds present  EXTREMITIES:  2+ Peripheral Pulses, No clubbing, cyanosis, or edema BL LE  LYMPH: No lymphadenopathy noted  SKIN: No rashes or lesions  NERVOUS SYSTEM:  Alert & Oriented X3, Good concentration; Motor Strength 5/5 B/L upper and lower extremities;   DTRs 2+ intact and symmetric, sensation intact BL    LABS:                        8.6    7.03  )-----------( 209      ( 09 Dec 2019 21:02 )             26.8         139  |  107  |  40<H>  ----------------------------<  103<H>  4.3   |  23  |  1.58<H>    Ca    11.8<H>      09 Dec 2019 21:02    TPro  6.7  /  Alb  2.8<L>  /  TBili  0.2  /  DBili  x   /  AST  26  /  ALT  23  /  AlkPhos  103      PT/INR - ( 09 Dec 2019 21:02 )   PT: 12.5 sec;   INR: 1.11 ratio         PTT - ( 09 Dec 2019 21:02 )  PTT:31.3 sec  Urinalysis Basic - ( 09 Dec 2019 22:57 )    Color: Yellow / Appearance: Clear / S.010 / pH: x  Gluc: x / Ketone: Negative  / Bili: Negative / Urobili: Negative mg/dL   Blood: x / Protein: 100 mg/dL / Nitrite: Negative   Leuk Esterase: Moderate / RBC: 0-2 /HPF / WBC 11-25   Sq Epi: x / Non Sq Epi: x / Bacteria: Many      Cultures;   CAPILLARY BLOOD GLUCOSE      POCT Blood Glucose.: 111 mg/dL (10 Dec 2019 07:29)  POCT Blood Glucose.: 118 mg/dL (09 Dec 2019 20:53)    Lipid panel:     CARDIAC MARKERS ( 09 Dec 2019 21:02 )  <.015 ng/mL / x     / x     / x     / x            RADIOLOGY & ADDITIONAL TESTS:      Imaging Personally Reviewed:  [ x] YES      Consultant(s) Notes Reviewed:  [x ] YES     Care Discussed with [x ] Consultants [X ] Patient [x ] Family  [x ]    [x ]  Other; RN Patient is a 89y old  Female who presents with a chief complaint of Weakness. (10 Dec 2019 06:42)      OVERNIGHT EVENTS: none   admitted yesterday  MEDICATIONS  (STANDING):  amLODIPine   Tablet 2.5 milliGRAM(s) Oral daily  aspirin enteric coated 81 milliGRAM(s) Oral daily  atorvastatin 40 milliGRAM(s) Oral at bedtime  cefTRIAXone   IVPB 1000 milliGRAM(s) IV Intermittent every 24 hours  dextrose 5%. 1000 milliLiter(s) (50 mL/Hr) IV Continuous <Continuous>  dextrose 50% Injectable 12.5 Gram(s) IV Push once  dextrose 50% Injectable 25 Gram(s) IV Push once  dextrose 50% Injectable 25 Gram(s) IV Push once  heparin  Injectable 5000 Unit(s) SubCutaneous every 12 hours  insulin lispro (HumaLOG) corrective regimen sliding scale   SubCutaneous three times a day before meals  levETIRAcetam 500 milliGRAM(s) Oral two times a day  sodium chloride 0.9%. 1000 milliLiter(s) (100 mL/Hr) IV Continuous <Continuous>    MEDICATIONS  (PRN):  dextrose 40% Gel 15 Gram(s) Oral once PRN Blood Glucose LESS THAN 70 milliGRAM(s)/deciliter  glucagon  Injectable 1 milliGRAM(s) IntraMuscular once PRN Glucose LESS THAN 70 milligrams/deciliter      Allergies    No Known Allergies    Intolerances        SUBJECTIVE: in bed in NAD, no acute events overnight     T(F): 98 (12-10-19 @ 06:40), Max: 98 (19 @ 20:24)  HR: 95 (12-10-19 @ 06:40) (95 - 100)  BP: 158/75 (12-10-19 @ 06:40) (120/70 - 163/74)  RR: 18 (12-10-19 @ 06:40) (17 - 18)  SpO2: 98% (12-10-19 @ 06:40) (98% - 100%)  Wt(kg): --    PHYSICAL EXAM:  GENERAL: NAD, well-groomed, well-developed  HEAD:  Atraumatic, Normocephalic  EYES: EOMI, PERRLA, conjunctiva and sclera clear  ENMT: No tonsillar erythema, exudates, or enlargement; Moist mucous membranes, Good dentition, No lesions  NECK: Supple, No JVD, Normal thyroid  CHEST/LUNG: Clear to  auscultation bilaterally; No rales, rhonchi, wheezing, or rubs  bilaterally  HEART: Regular rate and rhythm; No murmurs, rubs, or gallops  ABDOMEN: Soft, Nontender, Nondistended; Bowel sounds present  EXTREMITIES:  2+ Peripheral Pulses, No clubbing, cyanosis, or edema BL LE    SKIN: No rashes or lesions  NERVOUS SYSTEM:  Alert & Oriented X1 demented follows commands ;   DTRs 2+ intact and symmetric, sensation intact BL    LABS:                        8.6    7.03  )-----------( 209      ( 09 Dec 2019 21:02 )             26.8         139  |  107  |  40<H>  ----------------------------<  103<H>  4.3   |  23  |  1.58<H>    Ca    11.8<H>      09 Dec 2019 21:02    TPro  6.7  /  Alb  2.8<L>  /  TBili  0.2  /  DBili  x   /  AST  26  /  ALT  23  /  AlkPhos  103      PT/INR - ( 09 Dec 2019 21:02 )   PT: 12.5 sec;   INR: 1.11 ratio         PTT - ( 09 Dec 2019 21:02 )  PTT:31.3 sec  Urinalysis Basic - ( 09 Dec 2019 22:57 )    Color: Yellow / Appearance: Clear / S.010 / pH: x  Gluc: x / Ketone: Negative  / Bili: Negative / Urobili: Negative mg/dL   Blood: x / Protein: 100 mg/dL / Nitrite: Negative   Leuk Esterase: Moderate / RBC: 0-2 /HPF / WBC 11-25   Sq Epi: x / Non Sq Epi: x / Bacteria: Many      Cultures;   CAPILLARY BLOOD GLUCOSE      POCT Blood Glucose.: 111 mg/dL (10 Dec 2019 07:29)  POCT Blood Glucose.: 118 mg/dL (09 Dec 2019 20:53)    Lipid panel:     CARDIAC MARKERS ( 09 Dec 2019 21:02 )  <.015 ng/mL / x     / x     / x     / x            RADIOLOGY & ADDITIONAL TESTS:      Imaging Personally Reviewed:  [ x] YES      Consultant(s) Notes Reviewed:  [x ] YES     Care Discussed with [x ] Consultants [X ] Patient [x ] Family  [x ]    [x ]  Other; RN

## 2019-12-10 NOTE — PROGRESS NOTE ADULT - PROBLEM SELECTOR PLAN 1
likely related to UTI, hypercalcemia, dehydration, IV fluids, cultures in the presence of dementia and old cva   continue with antibx

## 2019-12-10 NOTE — H&P ADULT - NSHPPHYSICALEXAM_GEN_ALL_CORE
T(C): 36.7 (10 Dec 2019 06:40), Max: 36.7 (09 Dec 2019 20:24)  T(F): 98 (10 Dec 2019 06:40), Max: 98 (09 Dec 2019 20:24)  HR: 95 (10 Dec 2019 06:40) (95 - 100)  BP: 158/75 (10 Dec 2019 06:40) (120/70 - 163/74)  BP(mean): --  RR: 18 (10 Dec 2019 06:40) (17 - 18)  SpO2: 98% (10 Dec 2019 06:40) (98% - 100%)    PHYSICAL EXAM:  GENERAL: NAD, well-groomed, well-developed  HEAD:  Atraumatic, Normocephalic  EYES: EOMI, PERRLA, conjunctiva and sclera clear  ENMT: No tonsillar erythema, exudates, or enlargement; dry mucous membranes, No lesions  NECK: Supple, No JVD, Normal thyroid  NERVOUS SYSTEM:  Alert & Oriented X3, CN 2-12 intact, no focal deficits  CHEST/LUNG: Clear to percussion bilaterally; No rales, rhonchi, wheezing, or rubs  HEART: Regular rate and rhythm; No murmurs, rubs, or gallops  ABDOMEN: Soft, Nontender, Nondistended; Bowel sounds present  EXTREMITIES:  + Peripheral Pulses, No clubbing, cyanosis, or edema  LYMPH: No lymphadenopathy noted  SKIN: No rashes or lesions

## 2019-12-10 NOTE — PHYSICAL THERAPY INITIAL EVALUATION ADULT - CRITERIA FOR SKILLED THERAPEUTIC INTERVENTIONS
impairments found/rehab potential/functional limitations in following categories/subacute rehab./risk reduction/prevention/therapy frequency/anticipated discharge recommendation/predicted duration of therapy intervention

## 2019-12-10 NOTE — PHYSICAL THERAPY INITIAL EVALUATION ADULT - FUNCTIONAL LIMITATIONS, PT EVAL
ALL labs normal.  No follow up needed with me on these.  See PCP for her fatigue issues as we discussed.
Patent
home management/self-care

## 2019-12-10 NOTE — PHYSICAL THERAPY INITIAL EVALUATION ADULT - BED MOBILITY TRAINING, PT EVAL
Pt will independently perform all aspects of bed mobility to help prevent pressure ulcers, by 3 weeks

## 2019-12-10 NOTE — H&P ADULT - NSICDXPASTMEDICALHX_GEN_ALL_CORE_FT
PAST MEDICAL HISTORY:  CVA (cerebral vascular accident) april 2019    DM (diabetes mellitus)     Gout     HTN (hypertension)     Hyperlipidemia

## 2019-12-10 NOTE — PHYSICAL THERAPY INITIAL EVALUATION ADULT - ADDITIONAL COMMENTS
as per grandson, pt lives with daughter Mary at , with 5-6 steps to enter into the house with L HR. everything else is on one level. pt has a tub shower. pt has a HHA who comes 8 hours 4-5 days a week helping patient with cooking, cleaning, grooming. pt was supervision level walking with the walker. during step negotiation needed HHA.

## 2019-12-10 NOTE — H&P ADULT - HISTORY OF PRESENT ILLNESS
89 y/o Female PMH of HTN, DM2, HL, seizure d/o, gout presents to ED for eval of increasing weakness and change in mental status over the past few weeks.  Family states some days pt is at her baseline, then other times she is more lethargic, noted drooling.  Family states pt taken to outside hospital twice for sx, work up neg & dc home.  Family states pt has been compliant w her meds, no known grand mal seizures.  No fall/head trauma.  Pt currently denies pain, denies all complaints; she is poor historian, on memantine, possible dementia.

## 2019-12-10 NOTE — PROGRESS NOTE ADULT - PROBLEM SELECTOR PLAN 4
phosphorus level, PTH, PTHRP, vit D levels, IV fluids, trend BMP, may need calcitonin   monitor calcium and  check ionized level;

## 2019-12-10 NOTE — PHYSICAL THERAPY INITIAL EVALUATION ADULT - BALANCE TRAINING, PT EVAL
Pt will increase static/dynamic standing balance to WFL to perform all functional mobility without LOB, by 3 weeks

## 2019-12-10 NOTE — PHYSICAL THERAPY INITIAL EVALUATION ADULT - TRANSFER TRAINING, PT EVAL
Independent in  transfer ability bed to chair and vice versa using appropriate assistive device  and prevent falls by 3-4 weeks

## 2019-12-10 NOTE — PHYSICAL THERAPY INITIAL EVALUATION ADULT - STRENGTHENING, PT EVAL
Improve strength in the B UE/LE and be able to perform functional tasks-bed mobility, sitting, standing, transfers and ambulate in a safe manner with or without  assistive device and prevent falls.

## 2019-12-10 NOTE — H&P ADULT - NSHPLABSRESULTS_GEN_ALL_CORE
LABS:                        8.6    7.03  )-----------( 209      ( 09 Dec 2019 21:02 )             26.8         139  |  107  |  40<H>  ----------------------------<  103<H>  4.3   |  23  |  1.58<H>    Ca    11.8<H>      09 Dec 2019 21:02    TPro  6.7  /  Alb  2.8<L>  /  TBili  0.2  /  DBili  x   /  AST  26  /  ALT  23  /  AlkPhos  103      PT/INR - ( 09 Dec 2019 21:02 )   PT: 12.5 sec;   INR: 1.11 ratio         PTT - ( 09 Dec 2019 21:02 )  PTT:31.3 sec  Urinalysis Basic - ( 09 Dec 2019 22:57 )    Color: Yellow / Appearance: Clear / S.010 / pH: x  Gluc: x / Ketone: Negative  / Bili: Negative / Urobili: Negative mg/dL   Blood: x / Protein: 100 mg/dL / Nitrite: Negative   Leuk Esterase: Moderate / RBC: 0-2 /HPF / WBC 11-25   Sq Epi: x / Non Sq Epi: x / Bacteria: Many      CAPILLARY BLOOD GLUCOSE      POCT Blood Glucose.: 118 mg/dL (09 Dec 2019 20:53)  Troponin I, Serum: <.015:  ng/mL (19 @ 21:02)  Lactate, Blood: 1.1 mmol/L (19 @ 21:02)            RADIOLOGY & ADDITIONAL TESTS:  < from: CT Head No Cont (12.10.19 @ 00:19) >    IMPRESSION:    No acute intracranial hemorrhage, mass effect, or CT evidence of an acute   transcortical infarct.    Mild to moderate chronic ischemic changes in the frontoparietal white   matter and indeterminate age bilateral thalamic lacunar infarcts.     < end of copied text >    CXR: no infiltrate  Imaging Personally Reviewed:  [ x] YES  [ ] NO

## 2019-12-10 NOTE — ED ADULT NURSE REASSESSMENT NOTE - NS ED NURSE REASSESS COMMENT FT1
Spoke with phlebotomy regarding outstanding lab work. pt has difficult access and will likely require additional morning labs. Phlebotomist states he will draw her labs when he is done at ICU.

## 2019-12-11 DIAGNOSIS — E83.52 HYPERCALCEMIA: ICD-10-CM

## 2019-12-11 DIAGNOSIS — D72.829 ELEVATED WHITE BLOOD CELL COUNT, UNSPECIFIED: ICD-10-CM

## 2019-12-11 LAB
ANION GAP SERPL CALC-SCNC: 14 MMOL/L — SIGNIFICANT CHANGE UP (ref 5–17)
ANION GAP SERPL CALC-SCNC: 14 MMOL/L — SIGNIFICANT CHANGE UP (ref 5–17)
APPEARANCE UR: ABNORMAL
BACTERIA # UR AUTO: ABNORMAL
BILIRUB UR-MCNC: NEGATIVE — SIGNIFICANT CHANGE UP
BUN SERPL-MCNC: 47 MG/DL — HIGH (ref 7–23)
BUN SERPL-MCNC: 55 MG/DL — HIGH (ref 7–23)
CA-I BLD-SCNC: 1.59 MMOL/L — HIGH (ref 1.12–1.3)
CALCIUM SERPL-MCNC: 10.8 MG/DL — HIGH (ref 8.5–10.1)
CALCIUM SERPL-MCNC: 11.1 MG/DL — HIGH (ref 8.5–10.1)
CHLORIDE SERPL-SCNC: 107 MMOL/L — SIGNIFICANT CHANGE UP (ref 96–108)
CHLORIDE SERPL-SCNC: 110 MMOL/L — HIGH (ref 96–108)
CO2 SERPL-SCNC: 17 MMOL/L — LOW (ref 22–31)
CO2 SERPL-SCNC: 18 MMOL/L — LOW (ref 22–31)
COLOR SPEC: YELLOW — SIGNIFICANT CHANGE UP
COMMENT - URINE: SIGNIFICANT CHANGE UP
CREAT SERPL-MCNC: 3.01 MG/DL — HIGH (ref 0.5–1.3)
CREAT SERPL-MCNC: 3.52 MG/DL — HIGH (ref 0.5–1.3)
CULTURE RESULTS: SIGNIFICANT CHANGE UP
DIFF PNL FLD: ABNORMAL
EPI CELLS # UR: SIGNIFICANT CHANGE UP
GLUCOSE BLDC GLUCOMTR-MCNC: 173 MG/DL — HIGH (ref 70–99)
GLUCOSE BLDC GLUCOMTR-MCNC: 242 MG/DL — HIGH (ref 70–99)
GLUCOSE BLDC GLUCOMTR-MCNC: 349 MG/DL — HIGH (ref 70–99)
GLUCOSE BLDC GLUCOMTR-MCNC: 411 MG/DL — HIGH (ref 70–99)
GLUCOSE BLDC GLUCOMTR-MCNC: 411 MG/DL — HIGH (ref 70–99)
GLUCOSE SERPL-MCNC: 344 MG/DL — HIGH (ref 70–99)
GLUCOSE SERPL-MCNC: 409 MG/DL — HIGH (ref 70–99)
GLUCOSE UR QL: 1000 MG/DL
HBA1C BLD-MCNC: 7.7 % — HIGH (ref 4–5.6)
HCT VFR BLD CALC: 28.8 % — LOW (ref 34.5–45)
HCT VFR BLD CALC: 30.9 % — LOW (ref 34.5–45)
HGB BLD-MCNC: 9.1 G/DL — LOW (ref 11.5–15.5)
HGB BLD-MCNC: 9.7 G/DL — LOW (ref 11.5–15.5)
KETONES UR-MCNC: NEGATIVE — SIGNIFICANT CHANGE UP
LEUKOCYTE ESTERASE UR-ACNC: ABNORMAL
MAGNESIUM SERPL-MCNC: 1.8 MG/DL — SIGNIFICANT CHANGE UP (ref 1.6–2.6)
MCHC RBC-ENTMCNC: 24.7 PG — LOW (ref 27–34)
MCHC RBC-ENTMCNC: 24.9 PG — LOW (ref 27–34)
MCHC RBC-ENTMCNC: 31.4 GM/DL — LOW (ref 32–36)
MCHC RBC-ENTMCNC: 31.6 GM/DL — LOW (ref 32–36)
MCV RBC AUTO: 78 FL — LOW (ref 80–100)
MCV RBC AUTO: 79.2 FL — LOW (ref 80–100)
NITRITE UR-MCNC: NEGATIVE — SIGNIFICANT CHANGE UP
NRBC # BLD: 0 /100 WBCS — SIGNIFICANT CHANGE UP (ref 0–0)
NRBC # BLD: 0 /100 WBCS — SIGNIFICANT CHANGE UP (ref 0–0)
PH UR: 6 — SIGNIFICANT CHANGE UP (ref 5–8)
PHOSPHATE SERPL-MCNC: 5.3 MG/DL — HIGH (ref 2.5–4.5)
PLATELET # BLD AUTO: 230 K/UL — SIGNIFICANT CHANGE UP (ref 150–400)
PLATELET # BLD AUTO: 247 K/UL — SIGNIFICANT CHANGE UP (ref 150–400)
POTASSIUM SERPL-MCNC: 4.5 MMOL/L — SIGNIFICANT CHANGE UP (ref 3.5–5.3)
POTASSIUM SERPL-MCNC: 4.8 MMOL/L — SIGNIFICANT CHANGE UP (ref 3.5–5.3)
POTASSIUM SERPL-SCNC: 4.5 MMOL/L — SIGNIFICANT CHANGE UP (ref 3.5–5.3)
POTASSIUM SERPL-SCNC: 4.8 MMOL/L — SIGNIFICANT CHANGE UP (ref 3.5–5.3)
PROT SERPL-MCNC: 6.9 G/DL — SIGNIFICANT CHANGE UP (ref 6–8.3)
PROT SERPL-MCNC: 6.9 G/DL — SIGNIFICANT CHANGE UP (ref 6–8.3)
PROT UR-MCNC: 100 MG/DL
RBC # BLD: 3.69 M/UL — LOW (ref 3.8–5.2)
RBC # BLD: 3.9 M/UL — SIGNIFICANT CHANGE UP (ref 3.8–5.2)
RBC # FLD: 15.6 % — HIGH (ref 10.3–14.5)
RBC # FLD: 15.7 % — HIGH (ref 10.3–14.5)
RBC CASTS # UR COMP ASSIST: ABNORMAL /HPF (ref 0–4)
SODIUM SERPL-SCNC: 139 MMOL/L — SIGNIFICANT CHANGE UP (ref 135–145)
SODIUM SERPL-SCNC: 141 MMOL/L — SIGNIFICANT CHANGE UP (ref 135–145)
SP GR SPEC: 1.01 — SIGNIFICANT CHANGE UP (ref 1.01–1.02)
SPECIMEN SOURCE: SIGNIFICANT CHANGE UP
TSH SERPL-MCNC: 0.56 UU/ML — SIGNIFICANT CHANGE UP (ref 0.36–3.74)
UROBILINOGEN FLD QL: NEGATIVE MG/DL — SIGNIFICANT CHANGE UP
WBC # BLD: 17.92 K/UL — HIGH (ref 3.8–10.5)
WBC # BLD: 18.28 K/UL — HIGH (ref 3.8–10.5)
WBC # FLD AUTO: 17.92 K/UL — HIGH (ref 3.8–10.5)
WBC # FLD AUTO: 18.28 K/UL — HIGH (ref 3.8–10.5)
WBC UR QL: SIGNIFICANT CHANGE UP /HPF (ref 0–5)

## 2019-12-11 PROCEDURE — 99233 SBSQ HOSP IP/OBS HIGH 50: CPT

## 2019-12-11 RX ORDER — AZITHROMYCIN 500 MG/1
500 TABLET, FILM COATED ORAL EVERY 24 HOURS
Refills: 0 | Status: COMPLETED | OUTPATIENT
Start: 2019-12-12 | End: 2019-12-16

## 2019-12-11 RX ORDER — AZITHROMYCIN 500 MG/1
TABLET, FILM COATED ORAL
Refills: 0 | Status: COMPLETED | OUTPATIENT
Start: 2019-12-11 | End: 2019-12-17

## 2019-12-11 RX ORDER — PAMIDRONATE DISODIUM 9 MG/ML
60 INJECTION, SOLUTION INTRAVENOUS ONCE
Refills: 0 | Status: COMPLETED | OUTPATIENT
Start: 2019-12-11 | End: 2019-12-11

## 2019-12-11 RX ORDER — CEFTRIAXONE 500 MG/1
1000 INJECTION, POWDER, FOR SOLUTION INTRAMUSCULAR; INTRAVENOUS EVERY 24 HOURS
Refills: 0 | Status: COMPLETED | OUTPATIENT
Start: 2019-12-12 | End: 2019-12-16

## 2019-12-11 RX ORDER — CEFTRIAXONE 500 MG/1
INJECTION, POWDER, FOR SOLUTION INTRAMUSCULAR; INTRAVENOUS
Refills: 0 | Status: DISCONTINUED | OUTPATIENT
Start: 2019-12-11 | End: 2019-12-11

## 2019-12-11 RX ORDER — AZITHROMYCIN 500 MG/1
500 TABLET, FILM COATED ORAL ONCE
Refills: 0 | Status: COMPLETED | OUTPATIENT
Start: 2019-12-11 | End: 2019-12-11

## 2019-12-11 RX ADMIN — HEPARIN SODIUM 5000 UNIT(S): 5000 INJECTION INTRAVENOUS; SUBCUTANEOUS at 05:43

## 2019-12-11 RX ADMIN — HEPARIN SODIUM 5000 UNIT(S): 5000 INJECTION INTRAVENOUS; SUBCUTANEOUS at 17:09

## 2019-12-11 RX ADMIN — ATORVASTATIN CALCIUM 40 MILLIGRAM(S): 80 TABLET, FILM COATED ORAL at 22:49

## 2019-12-11 RX ADMIN — SODIUM CHLORIDE 100 MILLILITER(S): 9 INJECTION INTRAMUSCULAR; INTRAVENOUS; SUBCUTANEOUS at 05:43

## 2019-12-11 RX ADMIN — Medication 4: at 07:57

## 2019-12-11 RX ADMIN — LEVETIRACETAM 500 MILLIGRAM(S): 250 TABLET, FILM COATED ORAL at 17:09

## 2019-12-11 RX ADMIN — PAMIDRONATE DISODIUM 67.5 MILLIGRAM(S): 9 INJECTION, SOLUTION INTRAVENOUS at 17:01

## 2019-12-11 RX ADMIN — LEVETIRACETAM 500 MILLIGRAM(S): 250 TABLET, FILM COATED ORAL at 05:43

## 2019-12-11 RX ADMIN — Medication 81 MILLIGRAM(S): at 11:21

## 2019-12-11 RX ADMIN — Medication 6: at 10:54

## 2019-12-11 RX ADMIN — AMLODIPINE BESYLATE 2.5 MILLIGRAM(S): 2.5 TABLET ORAL at 05:43

## 2019-12-11 RX ADMIN — SODIUM CHLORIDE 100 MILLILITER(S): 9 INJECTION INTRAMUSCULAR; INTRAVENOUS; SUBCUTANEOUS at 11:21

## 2019-12-11 RX ADMIN — Medication 2: at 16:38

## 2019-12-11 RX ADMIN — CEFTRIAXONE 100 MILLIGRAM(S): 500 INJECTION, POWDER, FOR SOLUTION INTRAMUSCULAR; INTRAVENOUS at 07:57

## 2019-12-11 RX ADMIN — AZITHROMYCIN 255 MILLIGRAM(S): 500 TABLET, FILM COATED ORAL at 10:51

## 2019-12-11 NOTE — CONSULT NOTE ADULT - SUBJECTIVE AND OBJECTIVE BOX
Patient is a 89y old  Female who presents with a chief complaint of Weakness. (11 Dec 2019 14:19)      Reason For Consult: Hypercalcemia     HPI:  89 y/o Female PMH of HTN, DM2, HL, seizure d/o, gout presents to ED for eval of increasing weakness and change in mental status over the past few weeks.  Family states some days pt is at her baseline, then other times she is more lethargic, noted drooling.  Family states pt taken to outside hospital twice for sx, work up neg & dc home.  Family states pt has been compliant w her meds, no known grand mal seizures.  No fall/head trauma.  Pt currently denies pain, denies all complaints; she is poor historian, on memantine, possible dementia. (10 Dec 2019 06:42)  increased serum Calcium and decreased PTH   also HbA1C 7.7    PAST MEDICAL & SURGICAL HISTORY:  DM (diabetes mellitus)  Hyperlipidemia  Gout  HTN (hypertension)  CVA (cerebral vascular accident): april 2019  No significant past surgical history      FAMILY HISTORY:  No pertinent family history in first degree relatives        Social History:    MEDICATIONS  (STANDING):  amLODIPine   Tablet 2.5 milliGRAM(s) Oral daily  aspirin enteric coated 81 milliGRAM(s) Oral daily  atorvastatin 40 milliGRAM(s) Oral at bedtime  azithromycin  IVPB      dextrose 5%. 1000 milliLiter(s) (50 mL/Hr) IV Continuous <Continuous>  dextrose 50% Injectable 12.5 Gram(s) IV Push once  dextrose 50% Injectable 25 Gram(s) IV Push once  dextrose 50% Injectable 25 Gram(s) IV Push once  heparin  Injectable 5000 Unit(s) SubCutaneous every 12 hours  insulin lispro (HumaLOG) corrective regimen sliding scale   SubCutaneous three times a day before meals  levETIRAcetam 500 milliGRAM(s) Oral two times a day  sodium chloride 0.9%. 1000 milliLiter(s) (100 mL/Hr) IV Continuous <Continuous>    MEDICATIONS  (PRN):  dextrose 40% Gel 15 Gram(s) Oral once PRN Blood Glucose LESS THAN 70 milliGRAM(s)/deciliter  glucagon  Injectable 1 milliGRAM(s) IntraMuscular once PRN Glucose LESS THAN 70 milligrams/deciliter      REVIEW OF SYSTEMS:  CONSTITUTIONAL:  as per HPI      T(C): 37.1 (12-11-19 @ 11:52), Max: 37.1 (12-11-19 @ 11:52)  HR: 122 (12-11-19 @ 11:52) (110 - 130)  BP: 122/82 (12-11-19 @ 11:52) (122/82 - 168/99)  RR: 17 (12-11-19 @ 11:52) (16 - 18)  SpO2: 97% (12-11-19 @ 11:52) (97% - 100%)  Wt(kg): --    PHYSICAL EXAM: weak slightly confused   GENERAL: NAD, well-groomed, well-developed  HEAD:  Atraumatic, Normocephalic  EYES: EOMI, PERRLA, conjunctiva and sclera clear  ENMT: No tonsillar erythema, exudates, or enlargement; Moist mucous membranes, Good dentition, No lesions  NECK: Supple, No JVD, Normal thyroid  NERVOUS SYSTEM:  Alert & Oriented X3, Good concentration; Motor Strength 5/5 B/L upper and lower extremities; DTRs 2+ intact and symmetric  CHEST/LUNG: Clear to percussion bilaterally; No rales, rhonchi, wheezing, or rubs  HEART: Regular rate and rhythm; No murmurs, rubs, or gallops  ABDOMEN: Soft, Nontender, Nondistended; Bowel sounds present  EXTREMITIES:  2+ Peripheral Pulses, No clubbing, cyanosis, or edema  LYMPH: No lymphadenopathy noted  SKIN: No rashes or lesions    CAPILLARY BLOOD GLUCOSE      POCT Blood Glucose.: 411 mg/dL (11 Dec 2019 10:42)  POCT Blood Glucose.: 411 mg/dL (11 Dec 2019 10:40)  POCT Blood Glucose.: 349 mg/dL (11 Dec 2019 07:35)                            9.1    18.28 )-----------( 230      ( 11 Dec 2019 10:42 )             28.8       CMP:  12-11 @ 13:37  SGPT --  Albumin --   Alk Phos --   Anion Gap --   SGOT --   Total Bili --   BUN --   Calcium Total --   CO2 --   Chloride --   Creatinine --   eGFR if AA --   eGFR if non AA --   Glucose --   Potassium --   Protein 6.9   Sodium --      Thyroid Function Tests:  12-11 @ 10:42 TSH 0.557 FreeT4 -- T3 -- Anti TPO -- Anti Thyroglobulin Ab -- TSI --      Diabetes Tests: 12-11 @ 08:55 HbA1c 7.7 C-Peptide --       Parathyroids:     Adrenals:       Radiology:

## 2019-12-11 NOTE — PROGRESS NOTE ADULT - ASSESSMENT
89 y/o Female PMH of HTN, DM2, HL, seizure d/o, gout presents to ED for eval of increasing weakness and change in mental status over the past few weeks.  Family states some days pt is at her baseline, then other times she is more lethargic, noted drooling.  Family states pt taken to outside hospital twice for sx, work up neg & dc home.  Family states pt has been compliant w her meds, no known grand mal seizures.  No fall/head trauma.  Pt currently denies pain, denies all complaints; she is poor historian, on memantine, possible dementia. She is clinically dehydrated with elevated creatinine, has hypercalcemia, likely metabolic/septic encephalopathy.  I

## 2019-12-11 NOTE — CONSULT NOTE ADULT - SUBJECTIVE AND OBJECTIVE BOX
[facility] NEPHROLOGY SERVICES, [facility]  NEPHROLOGY AND HYPERTENSION  [address]  [address]  [place] [address]  [phone]    MD MOE JAMES MD ANDREY GONCHARUK, MD MADHU KORRAPATI, MD YELENA ROSENBERG, MD BINNY KOSHY, MD CHRISTOPHER CAPUTO, MD EDWARD BOVER, MD      Information from chart:  "Patient is a 89y old  Female who presents with a chief complaint of Weakness. (11 Dec 2019 09:47)    HPI:  89 y/o Female PMH of HTN, DM2, HL, seizure d/o, gout presents to ED for eval of increasing weakness and change in mental status over the past few weeks.  Family states some days pt is at her baseline, then other times she is more lethargic, noted drooling.  Family states pt taken to outside hospital twice for sx, work up neg & dc home.  Family states pt has been compliant w her meds, no known grand mal seizures.  No fall/head trauma.  Pt currently denies pain, denies all complaints; she is poor historian, on memantine, possible dementia. (10 Dec 2019 06:42)   "    MARGARET this admission;   Hypercalcemia  High bladder PVR;   de jesus with 700 + ml  PTH low    PAST MEDICAL & SURGICAL HISTORY:  DM (diabetes mellitus)  Hyperlipidemia  Gout  HTN (hypertension)  CVA (cerebral vascular accident): 2019  No significant past surgical history    FAMILY HISTORY:  No pertinent family history in first degree relatives    Allergies    No Known Allergies    Intolerances      Home Medications:  allopurinol 100 mg oral tablet: 1 tab(s) orally 2 times a day (09 Dec 2019 20:50)  amLODIPine 5 mg oral tablet: 1 tab(s) orally once a day (09 Dec 2019 20:50)  Aspir 81 oral delayed release tablet: 1 tab(s) orally once a day (09 Dec 2019 20:50)  atorvastatin 40 mg oral tablet: 1 tab(s) orally once a day (09 Dec 2019 20:50)  levETIRAcetam 500 mg oral tablet: 1 tab(s) orally 2 times a day (09 Dec 2019 20:50)  losartan 50 mg oral tablet: 1 tab(s) orally once a day (09 Dec 2019 20:50)  memantine 10 mg oral tablet: 1 tab(s) orally 2 times a day (09 Dec 2019 20:50)  metFORMIN 500 mg oral tablet, extended release: 1 tab(s) orally 2 times a day (09 Dec 2019 20:50)    MEDICATIONS  (STANDING):  amLODIPine   Tablet 2.5 milliGRAM(s) Oral daily  aspirin enteric coated 81 milliGRAM(s) Oral daily  atorvastatin 40 milliGRAM(s) Oral at bedtime  azithromycin  IVPB      dextrose 5%. 1000 milliLiter(s) (50 mL/Hr) IV Continuous <Continuous>  dextrose 50% Injectable 12.5 Gram(s) IV Push once  dextrose 50% Injectable 25 Gram(s) IV Push once  dextrose 50% Injectable 25 Gram(s) IV Push once  heparin  Injectable 5000 Unit(s) SubCutaneous every 12 hours  insulin lispro (HumaLOG) corrective regimen sliding scale   SubCutaneous three times a day before meals  levETIRAcetam 500 milliGRAM(s) Oral two times a day  sodium chloride 0.9%. 1000 milliLiter(s) (100 mL/Hr) IV Continuous <Continuous>    MEDICATIONS  (PRN):  dextrose 40% Gel 15 Gram(s) Oral once PRN Blood Glucose LESS THAN 70 milliGRAM(s)/deciliter  glucagon  Injectable 1 milliGRAM(s) IntraMuscular once PRN Glucose LESS THAN 70 milligrams/deciliter    Vital Signs Last 24 Hrs  T(C): 37.1 (11 Dec 2019 11:52), Max: 37.1 (11 Dec 2019 11:52)  T(F): 98.7 (11 Dec 2019 11:52), Max: 98.7 (11 Dec 2019 11:52)  HR: 122 (11 Dec 2019 11:52) (110 - 130)  BP: 122/82 (11 Dec 2019 11:52) (122/82 - 168/99)  BP(mean): --  RR: 17 (11 Dec 2019 11:52) (16 - 18)  SpO2: 97% (11 Dec 2019 11:52) (97% - 100%)    Daily     Daily Weight in k.8 (11 Dec 2019 05:02)    19 @ 07:01  -  19 @ 14:20  --------------------------------------------------------  IN: 240 mL / OUT: 0 mL / NET: 240 mL      CAPILLARY BLOOD GLUCOSE      POCT Blood Glucose.: 411 mg/dL (11 Dec 2019 10:42)  POCT Blood Glucose.: 411 mg/dL (11 Dec 2019 10:40)  POCT Blood Glucose.: 349 mg/dL (11 Dec 2019 07:35)  POCT Blood Glucose.: 213 mg/dL (10 Dec 2019 15:35)    PHYSICAL EXAM:      T(C): 37.1 (19 @ 11:52), Max: 37.1 (19 @ 11:52)  HR: 122 (19 @ 11:52) (110 - 130)  BP: 122/82 (19 @ 11:52) (122/82 - 168/99)  RR: 17 (19 @ 11:52) (16 - 18)  SpO2: 97% (19 @ 11:52) (97% - 100%)  Wt(kg): --  Respiratory: clear anteriorly, decreased BS at bases  Cardiovascular: S1 S2  Gastrointestinal: soft NT ND +BS  Extremities:   tr edema                  141  |  110<H>  |  55<H>  ----------------------------<  409<H>  4.5   |  17<L>  |  3.52<H>    Ca    10.8<H>      11 Dec 2019 10:42  Phos  5.3       Mg     1.8         TPro  6.7  /  Alb  2.8<L>  /  TBili  0.2  /  DBili  x   /  AST  26  /  ALT  23  /  AlkPhos  103                            9.1    18.28 )-----------( 230      ( 11 Dec 2019 10:42 )             28.8     Creatinine Trend: 3.52<--, 3.01<--, 1.32<--, 1.58<--  Urinalysis Basic - ( 11 Dec 2019 13:27 )    Color: Yellow / Appearance: very cloudy / S.015 / pH: x  Gluc: x / Ketone: Negative  / Bili: Negative / Urobili: Negative mg/dL   Blood: x / Protein: 100 mg/dL / Nitrite: Negative   Leuk Esterase: Moderate / RBC: 11-25 /HPF / WBC TNTC /HPF   Sq Epi: x / Non Sq Epi: Few / Bacteria: Few            Assessment   MARGARET pre/ post renal azotemia, ALANIZ  Hypercalcemia, r/o paraneoplastic     Plan  De Jesus  IVF NS;   Follow paraprotein, Vit D pthrp studies;     Mateo Fatima MD [facility] NEPHROLOGY SERVICES, [facility]  NEPHROLOGY AND HYPERTENSION  [address]  [address]  [place] [address]  [phone]    MD MOE JAMES MD ANDREY GONCHARUK, MD MADHU KORRAPATI, MD YELENA ROSENBERG, MD BINNY KOSHY, MD CHRISTOPHER CAPUTO, MD EDWARD BOVER, MD      Information from chart:  "Patient is a 89y old  Female who presents with a chief complaint of Weakness. (11 Dec 2019 09:47)    HPI:  89 y/o Female PMH of HTN, DM2, HL, seizure d/o, gout presents to ED for eval of increasing weakness and change in mental status over the past few weeks.  Family states some days pt is at her baseline, then other times she is more lethargic, noted drooling.  Family states pt taken to outside hospital twice for sx, work up neg & dc home.  Family states pt has been compliant w her meds, no known grand mal seizures.  No fall/head trauma.  Pt currently denies pain, denies all complaints; she is poor historian, on memantine, possible dementia. (10 Dec 2019 06:42)   "    MARGARET this admission;   Hypercalcemia  High bladder PVR;   de jesus with 700 + ml  PTH low    PAST MEDICAL & SURGICAL HISTORY:  DM (diabetes mellitus)  Hyperlipidemia  Gout  HTN (hypertension)  CVA (cerebral vascular accident): 2019  No significant past surgical history    FAMILY HISTORY:  No pertinent family history in first degree relatives    Allergies    No Known Allergies    Intolerances      Home Medications:  allopurinol 100 mg oral tablet: 1 tab(s) orally 2 times a day (09 Dec 2019 20:50)  amLODIPine 5 mg oral tablet: 1 tab(s) orally once a day (09 Dec 2019 20:50)  Aspir 81 oral delayed release tablet: 1 tab(s) orally once a day (09 Dec 2019 20:50)  atorvastatin 40 mg oral tablet: 1 tab(s) orally once a day (09 Dec 2019 20:50)  levETIRAcetam 500 mg oral tablet: 1 tab(s) orally 2 times a day (09 Dec 2019 20:50)  losartan 50 mg oral tablet: 1 tab(s) orally once a day (09 Dec 2019 20:50)  memantine 10 mg oral tablet: 1 tab(s) orally 2 times a day (09 Dec 2019 20:50)  metFORMIN 500 mg oral tablet, extended release: 1 tab(s) orally 2 times a day (09 Dec 2019 20:50)    MEDICATIONS  (STANDING):  amLODIPine   Tablet 2.5 milliGRAM(s) Oral daily  aspirin enteric coated 81 milliGRAM(s) Oral daily  atorvastatin 40 milliGRAM(s) Oral at bedtime  azithromycin  IVPB      dextrose 5%. 1000 milliLiter(s) (50 mL/Hr) IV Continuous <Continuous>  dextrose 50% Injectable 12.5 Gram(s) IV Push once  dextrose 50% Injectable 25 Gram(s) IV Push once  dextrose 50% Injectable 25 Gram(s) IV Push once  heparin  Injectable 5000 Unit(s) SubCutaneous every 12 hours  insulin lispro (HumaLOG) corrective regimen sliding scale   SubCutaneous three times a day before meals  levETIRAcetam 500 milliGRAM(s) Oral two times a day  sodium chloride 0.9%. 1000 milliLiter(s) (100 mL/Hr) IV Continuous <Continuous>    MEDICATIONS  (PRN):  dextrose 40% Gel 15 Gram(s) Oral once PRN Blood Glucose LESS THAN 70 milliGRAM(s)/deciliter  glucagon  Injectable 1 milliGRAM(s) IntraMuscular once PRN Glucose LESS THAN 70 milligrams/deciliter    Vital Signs Last 24 Hrs  T(C): 37.1 (11 Dec 2019 11:52), Max: 37.1 (11 Dec 2019 11:52)  T(F): 98.7 (11 Dec 2019 11:52), Max: 98.7 (11 Dec 2019 11:52)  HR: 122 (11 Dec 2019 11:52) (110 - 130)  BP: 122/82 (11 Dec 2019 11:52) (122/82 - 168/99)  BP(mean): --  RR: 17 (11 Dec 2019 11:52) (16 - 18)  SpO2: 97% (11 Dec 2019 11:52) (97% - 100%)    Daily     Daily Weight in k.8 (11 Dec 2019 05:02)    19 @ 07:01  -  19 @ 14:20  --------------------------------------------------------  IN: 240 mL / OUT: 0 mL / NET: 240 mL      CAPILLARY BLOOD GLUCOSE      POCT Blood Glucose.: 411 mg/dL (11 Dec 2019 10:42)  POCT Blood Glucose.: 411 mg/dL (11 Dec 2019 10:40)  POCT Blood Glucose.: 349 mg/dL (11 Dec 2019 07:35)  POCT Blood Glucose.: 213 mg/dL (10 Dec 2019 15:35)    PHYSICAL EXAM:      T(C): 37.1 (19 @ 11:52), Max: 37.1 (19 @ 11:52)  HR: 122 (19 @ 11:52) (110 - 130)  BP: 122/82 (19 @ 11:52) (122/82 - 168/99)  RR: 17 (19 @ 11:52) (16 - 18)  SpO2: 97% (19 @ 11:52) (97% - 100%)  Wt(kg): --  Respiratory: clear anteriorly, decreased BS at bases  Cardiovascular: S1 S2  Gastrointestinal: soft NT ND +BS  Extremities:   tr edema                  141  |  110<H>  |  55<H>  ----------------------------<  409<H>  4.5   |  17<L>  |  3.52<H>    Ca    10.8<H>      11 Dec 2019 10:42  Phos  5.3       Mg     1.8         TPro  6.7  /  Alb  2.8<L>  /  TBili  0.2  /  DBili  x   /  AST  26  /  ALT  23  /  AlkPhos  103                            9.1    18.28 )-----------( 230      ( 11 Dec 2019 10:42 )             28.8     Creatinine Trend: 3.52<--, 3.01<--, 1.32<--, 1.58<--  Urinalysis Basic - ( 11 Dec 2019 13:27 )    Color: Yellow / Appearance: very cloudy / S.015 / pH: x  Gluc: x / Ketone: Negative  / Bili: Negative / Urobili: Negative mg/dL   Blood: x / Protein: 100 mg/dL / Nitrite: Negative   Leuk Esterase: Moderate / RBC: 11-25 /HPF / WBC TNTC /HPF   Sq Epi: x / Non Sq Epi: Few / Bacteria: Few        Vitamin D, 1, 25-Dihydroxy (12.10.19 @ 14:48)    Vitamin D, 1, 25-Dihydroxy: 69.9 pg/mL    Vitamin D, 25-Hydroxy (12.10.19 @ 12:39)    Vitamin D, 25-Hydroxy: 31.3: VITD Interpretive Data Result:    Parathyroid Hormone Intact, Serum (12.10.19 @ 12:39)    Intact PTH: 4: PTH METHOD:      Assessment   MARGARET pre/ post renal azotemia, ALANIZ  Hypercalcemia, r/o paraneoplastic     Plan    De Jesus  IVF NS;   Follow paraprotein,  pthrp studies;   Pending Ca trend, will add pamidronate  Renal US r/o stone disease.    Mateo Fatima MD

## 2019-12-11 NOTE — PROGRESS NOTE ADULT - PROBLEM SELECTOR PLAN 4
phosphorus level, PTH low ,   PTHRP pending   vitamin d level wnl ( low normal)   ? MM ? hyperthyroid check tsh free t4   ? consult endocrine

## 2019-12-11 NOTE — PROGRESS NOTE ADULT - PROBLEM SELECTOR PLAN 7
unclear etiology   possibly due to pna   reviewed cxr ? small base infiltrate    will get blood cx   and start  antibx   also f/u urine cx

## 2019-12-11 NOTE — CONSULT NOTE ADULT - ASSESSMENT
- Continue de jesus - Continue de jesus  - Noted nephrology recs: Agree w/ Follow-up renal/bladder ultrasound 89 y/o Female w/ multiple medical comorbidities, HTN, DM2, HL, seizure d/o, gout, and CVA 2019 who presented to ED for eval of increasing weakness and change in mental status over the past few weeks.  Urinary retention/Neurogenic bladder (w/ history of CVA, altered mental status, decreased mobility, multiple meds)  - Continue de jesus  - possible outpatient urodynamics to work-up neurogenic bladder pending discussion with family  - Noted nephrology recs: Agree w/ Follow-up renal/bladder ultrasound

## 2019-12-11 NOTE — CONSULT NOTE ADULT - SUBJECTIVE AND OBJECTIVE BOX
HPI:   Patient is a 89y old  Female who presents with a chief complaint of Weakness. (11 Dec 2019 14:19)      Reason For Consult: Hypercalcemia     HPI:  89 y/o Female PMH of HTN, DM2, HL, seizure d/o, gout presents to ED for eval of increasing weakness and change in mental status over the past few weeks.  Family states some days pt is at her baseline, then other times she is more lethargic, noted drooling.  Family states pt taken to outside hospital twice for sx, work up neg & dc home.  Family states pt has been compliant w her meds, no known grand mal seizures.  No fall/head trauma.  Pt currently denies pain, denies all complaints; she is poor historian, on memantine, possible dementia. (10 Dec 2019 06:42)  increased serum Calcium and decreased PTH   also HbA1C 7.7    PAST MEDICAL & SURGICAL HISTORY:  DM (diabetes mellitus)  Hyperlipidemia  Gout  HTN (hypertension)  CVA (cerebral vascular accident): april 2019  No significant past surgical history      FAMILY HISTORY:  No pertinent family history in first degree relatives        Social History:    MEDICATIONS  (STANDING):  amLODIPine   Tablet 2.5 milliGRAM(s) Oral daily  aspirin enteric coated 81 milliGRAM(s) Oral daily  atorvastatin 40 milliGRAM(s) Oral at bedtime  azithromycin  IVPB      dextrose 5%. 1000 milliLiter(s) (50 mL/Hr) IV Continuous <Continuous>  dextrose 50% Injectable 12.5 Gram(s) IV Push once  dextrose 50% Injectable 25 Gram(s) IV Push once  dextrose 50% Injectable 25 Gram(s) IV Push once  heparin  Injectable 5000 Unit(s) SubCutaneous every 12 hours  insulin lispro (HumaLOG) corrective regimen sliding scale   SubCutaneous three times a day before meals  levETIRAcetam 500 milliGRAM(s) Oral two times a day  sodium chloride 0.9%. 1000 milliLiter(s) (100 mL/Hr) IV Continuous <Continuous>    MEDICATIONS  (PRN):  dextrose 40% Gel 15 Gram(s) Oral once PRN Blood Glucose LESS THAN 70 milliGRAM(s)/deciliter  glucagon  Injectable 1 milliGRAM(s) IntraMuscular once PRN Glucose LESS THAN 70 milligrams/deciliter      REVIEW OF SYSTEMS:  CONSTITUTIONAL:  as per HPI      T(C): 37.1 (12-11-19 @ 11:52), Max: 37.1 (12-11-19 @ 11:52)  HR: 122 (12-11-19 @ 11:52) (110 - 130)  BP: 122/82 (12-11-19 @ 11:52) (122/82 - 168/99)  RR: 17 (12-11-19 @ 11:52) (16 - 18)  SpO2: 97% (12-11-19 @ 11:52) (97% - 100%)  Wt(kg): --    PHYSICAL EXAM: weak slightly confused   GENERAL: NAD, well-groomed, well-developed  HEAD:  Atraumatic, Normocephalic  EYES: EOMI, PERRLA, conjunctiva and sclera clear  ENMT: No tonsillar erythema, exudates, or enlargement; Moist mucous membranes, Good dentition, No lesions  NECK: Supple, No JVD, Normal thyroid  NERVOUS SYSTEM:  Alert & Oriented X3, Good concentration; Motor Strength 5/5 B/L upper and lower extremities; DTRs 2+ intact and symmetric  CHEST/LUNG: Clear to percussion bilaterally; No rales, rhonchi, wheezing, or rubs  HEART: Regular rate and rhythm; No murmurs, rubs, or gallops  ABDOMEN: Soft, Nontender, Nondistended; Bowel sounds present  EXTREMITIES:  2+ Peripheral Pulses, No clubbing, cyanosis, or edema  LYMPH: No lymphadenopathy noted  SKIN: No rashes or lesions HPI:  89 y/o Female PMH of HTN, DM2, HL, seizure d/o, gout presents to ED for eval of increasing weakness and change in mental status over the past few weeks.  Family states some days pt is at her baseline, then other times she is more lethargic, noted drooling.  Family states pt taken to outside hospital twice for sx, work up neg & dc home.  Family states pt has been compliant w her meds, no known grand mal seizures.  No fall/head trauma.  Pt currently denies pain, denies all complaints; she is poor historian, on memantine, possible dementia.  Endorsed frequent urination and urgency; intermittent prior to admission.    HbA1C 7.7    PAST MEDICAL & SURGICAL HISTORY:  DM (diabetes mellitus)  Hyperlipidemia  Gout  HTN (hypertension)  CVA (cerebral vascular accident): april 2019  No significant past surgical history      FAMILY HISTORY:  No pertinent family history in first degree relatives        Social History:    MEDICATIONS  (STANDING):  amLODIPine   Tablet 2.5 milliGRAM(s) Oral daily  aspirin enteric coated 81 milliGRAM(s) Oral daily  atorvastatin 40 milliGRAM(s) Oral at bedtime  azithromycin  IVPB      dextrose 5%. 1000 milliLiter(s) (50 mL/Hr) IV Continuous <Continuous>  dextrose 50% Injectable 12.5 Gram(s) IV Push once  dextrose 50% Injectable 25 Gram(s) IV Push once  dextrose 50% Injectable 25 Gram(s) IV Push once  heparin  Injectable 5000 Unit(s) SubCutaneous every 12 hours  insulin lispro (HumaLOG) corrective regimen sliding scale   SubCutaneous three times a day before meals  levETIRAcetam 500 milliGRAM(s) Oral two times a day  sodium chloride 0.9%. 1000 milliLiter(s) (100 mL/Hr) IV Continuous <Continuous>    MEDICATIONS  (PRN):  dextrose 40% Gel 15 Gram(s) Oral once PRN Blood Glucose LESS THAN 70 milliGRAM(s)/deciliter  glucagon  Injectable 1 milliGRAM(s) IntraMuscular once PRN Glucose LESS THAN 70 milligrams/deciliter      REVIEW OF SYSTEMS:  CONSTITUTIONAL:  as per HPI      T(C): 37.1 (12-11-19 @ 11:52), Max: 37.1 (12-11-19 @ 11:52)  HR: 122 (12-11-19 @ 11:52) (110 - 130)  BP: 122/82 (12-11-19 @ 11:52) (122/82 - 168/99)  RR: 17 (12-11-19 @ 11:52) (16 - 18)  SpO2: 97% (12-11-19 @ 11:52) (97% - 100%)  Wt(kg): --    PHYSICAL EXAM:  Slightly confused   GENERAL: NAD, well-groomed, well-developed  HEAD:  Atraumatic, Normocephalic  EYES: EOMI, PERRL  NERVOUS SYSTEM:  Alert & Oriented X3, Good concentration; Motor Strength 5/5 B/L upper and lower extremities; DTRs 2+ intact and symmetric  CHEST/LUNG: Clear to percussion bilaterally; No rales, rhonchi, wheezing, or rubs  HEART: Regular rate and rhythm; No murmurs, rubs, or gallops  ABDOMEN: Soft, Nontender, Nondistended; Bowel sounds present  EXTREMITIES:  2+ Peripheral Pulses, No clubbing, cyanosis, or edema  LYMPH: No lymphadenopathy noted  SKIN: No rashes or lesions

## 2019-12-11 NOTE — PROGRESS NOTE ADULT - SUBJECTIVE AND OBJECTIVE BOX
Patient is a 89y old  Female who presents with a chief complaint of Weakness. (10 Dec 2019 06:42)      OVERNIGHT EVENTS: none   MEDICATIONS  (STANDING):  amLODIPine   Tablet 2.5 milliGRAM(s) Oral daily  aspirin enteric coated 81 milliGRAM(s) Oral daily  atorvastatin 40 milliGRAM(s) Oral at bedtime  azithromycin  IVPB 500 milliGRAM(s) IV Intermittent once  azithromycin  IVPB      cefTRIAXone   IVPB      dextrose 5%. 1000 milliLiter(s) (50 mL/Hr) IV Continuous <Continuous>  dextrose 50% Injectable 12.5 Gram(s) IV Push once  dextrose 50% Injectable 25 Gram(s) IV Push once  dextrose 50% Injectable 25 Gram(s) IV Push once  heparin  Injectable 5000 Unit(s) SubCutaneous every 12 hours  insulin lispro (HumaLOG) corrective regimen sliding scale   SubCutaneous three times a day before meals  levETIRAcetam 500 milliGRAM(s) Oral two times a day  sodium chloride 0.9%. 1000 milliLiter(s) (100 mL/Hr) IV Continuous <Continuous>    MEDICATIONS  (PRN):  dextrose 40% Gel 15 Gram(s) Oral once PRN Blood Glucose LESS THAN 70 milliGRAM(s)/deciliter  glucagon  Injectable 1 milliGRAM(s) IntraMuscular once PRN Glucose LESS THAN 70 milligrams/deciliter        Allergies    No Known Allergies    Intolerances        SUBJECTIVE: in bed in NAD, no acute events overnight     Vital Signs Last 24 Hrs  T(C): 36.4 (11 Dec 2019 05:02), Max: 36.9 (10 Dec 2019 12:11)  T(F): 97.5 (11 Dec 2019 05:02), Max: 98.5 (10 Dec 2019 12:11)  HR: 124 (11 Dec 2019 05:02) (98 - 130)  BP: 134/88 (11 Dec 2019 05:02) (134/88 - 168/99)  BP(mean): --  RR: 18 (11 Dec 2019 05:02) (16 - 18)  SpO2: 100% (11 Dec 2019 05:02) (98% - 100%)    PHYSICAL EXAM:    GENERAL: NAD, well-groomed, well-developed  HEAD:  Atraumatic, Normocephalic  EYES: EOMI, PERRLA, conjunctiva and sclera clear  ENMT: No tonsillar erythema, exudates, or enlargement; Moist mucous membranes, Good dentition, No lesions  NECK: Supple, No JVD, Normal thyroid  CHEST/LUNG: Clear to  auscultation bilaterally; No rales, rhonchi, wheezing, or rubs  bilaterally  HEART: Regular rate and rhythm; No murmurs, rubs, or gallops  ABDOMEN: Soft, Nontender, Nondistended; Bowel sounds present  EXTREMITIES:  2+ Peripheral Pulses, No clubbing, cyanosis, or edema BL LE    SKIN: No rashes or lesions  NERVOUS SYSTEM:  Alert & Oriented X1 demented follows commands ;   DTRs 2+ intact and symmetric, sensation intact BL    LABS:                                           9.7    17.92 )-----------( 247      ( 11 Dec 2019 07:11 )             30.9   12-11    139  |  107  |  47<H>  ----------------------------<  344<H>  4.8   |  18<L>  |  3.01<H>    Ca    11.1<H>      11 Dec 2019 07:11  Phos  5.3     12-11  Mg     1.8     12-11    TPro  6.7  /  Alb  2.8<L>  /  TBili  0.2  /  DBili  x   /  AST  26  /  ALT  23  /  AlkPhos  103  12-09      Vitamin D, 1, 25-Dihydroxy (12.10.19 @ 14:48)    Vitamin D, 1, 25-Dihydroxy: 69.9 pg/mL    Parathyroid Hormone Intact, Serum (12.10.19 @ 12:39)    Intact PTH: 4: PTH METHOD: Roche  Guide for Interpretation of PTH and Calcium Results                           Calcium             PTH                           MG/DL               PG/ML  Normal                   8.4-10.5            15-65  Primary  Hyperparathyroidism      >10.5               >50  Non-PTH Hypercalcemia    >10.5               0-20  Hypoparathyroidism       <8.4                0-20  Pseudohypoparathyroid    <8.4                >50  This is intended as a guide only. Factors such as sunlight exposure,  Vitamin D status and renal function should be evaluated along with  clinical presentation. pg/mL      Cultures;           Calcium, Ionized in AM (12.11.19 @ 09:18)    Calcium, Ionized: 1.59 mmoL/L      POCT Blood Glucose.: 349 mg/dL (11 Dec 2019 07:35)  POCT Blood Glucose.: 213 mg/dL (10 Dec 2019 15:35)  POCT Blood Glucose.: 154 mg/dL (10 Dec 2019 10:42)    Lipid panel:     CARDIAC MARKERS ( 09 Dec 2019 21:02 )  <.015 ng/mL / x     / x     / x     / x            RADIOLOGY & ADDITIONAL TESTS:      Imaging Personally Reviewed:  [ x] YES      Consultant(s) Notes Reviewed:  [x ] YES     Care Discussed with [x ] Consultants [X ] Patient [x ] Family  [x ]    [x ]  Other; RN

## 2019-12-11 NOTE — PROGRESS NOTE ADULT - PROBLEM SELECTOR PLAN 6
no old labs to compare    worsening on today repeat labs stat consult renal    bladder scan now if elevated place de jesus    continue with ivf   ? MM check spep and upep

## 2019-12-12 DIAGNOSIS — E11.65 TYPE 2 DIABETES MELLITUS WITH HYPERGLYCEMIA: ICD-10-CM

## 2019-12-12 DIAGNOSIS — R33.8 OTHER RETENTION OF URINE: ICD-10-CM

## 2019-12-12 LAB
ANION GAP SERPL CALC-SCNC: 9 MMOL/L — SIGNIFICANT CHANGE UP (ref 5–17)
BUN SERPL-MCNC: 44 MG/DL — HIGH (ref 7–23)
CALCIUM SERPL-MCNC: 10.6 MG/DL — HIGH (ref 8.5–10.1)
CHLORIDE SERPL-SCNC: 116 MMOL/L — HIGH (ref 96–108)
CO2 SERPL-SCNC: 19 MMOL/L — LOW (ref 22–31)
CREAT SERPL-MCNC: 2.02 MG/DL — HIGH (ref 0.5–1.3)
CREATININE, URINE RESULT: 77 MG/DL — SIGNIFICANT CHANGE UP
GLUCOSE BLDC GLUCOMTR-MCNC: 192 MG/DL — HIGH (ref 70–99)
GLUCOSE BLDC GLUCOMTR-MCNC: 206 MG/DL — HIGH (ref 70–99)
GLUCOSE BLDC GLUCOMTR-MCNC: 219 MG/DL — HIGH (ref 70–99)
GLUCOSE SERPL-MCNC: 195 MG/DL — HIGH (ref 70–99)
HCT VFR BLD CALC: 28.1 % — LOW (ref 34.5–45)
HGB BLD-MCNC: 8.6 G/DL — LOW (ref 11.5–15.5)
IGA FLD-MCNC: 99 MG/DL — SIGNIFICANT CHANGE UP (ref 84–499)
IGG FLD-MCNC: 442 MG/DL — LOW (ref 610–1660)
IGM SERPL-MCNC: 38 MG/DL — SIGNIFICANT CHANGE UP (ref 35–242)
KAPPA LC SER QL IFE: 1.55 MG/DL — SIGNIFICANT CHANGE UP (ref 0.33–1.94)
KAPPA/LAMBDA FREE LIGHT CHAIN RATIO, SERUM: 0.93 RATIO — SIGNIFICANT CHANGE UP (ref 0.26–1.65)
LAMBDA LC SER QL IFE: 1.66 MG/DL — SIGNIFICANT CHANGE UP (ref 0.57–2.63)
LEVETIRACETAM SERPL-MCNC: 26.1 MCG/ML — SIGNIFICANT CHANGE UP (ref 12–46)
MAGNESIUM SERPL-MCNC: 1.7 MG/DL — SIGNIFICANT CHANGE UP (ref 1.6–2.6)
MCHC RBC-ENTMCNC: 25.1 PG — LOW (ref 27–34)
MCHC RBC-ENTMCNC: 30.6 GM/DL — LOW (ref 32–36)
MCV RBC AUTO: 82.2 FL — SIGNIFICANT CHANGE UP (ref 80–100)
NRBC # BLD: 0 /100 WBCS — SIGNIFICANT CHANGE UP (ref 0–0)
PHOSPHATE SERPL-MCNC: 3.3 MG/DL — SIGNIFICANT CHANGE UP (ref 2.5–4.5)
PLATELET # BLD AUTO: 166 K/UL — SIGNIFICANT CHANGE UP (ref 150–400)
POTASSIUM SERPL-MCNC: 4 MMOL/L — SIGNIFICANT CHANGE UP (ref 3.5–5.3)
POTASSIUM SERPL-SCNC: 4 MMOL/L — SIGNIFICANT CHANGE UP (ref 3.5–5.3)
PROCALCITONIN SERPL-MCNC: 0.22 NG/ML — HIGH (ref 0.02–0.1)
PROT ?TM UR-MCNC: 75 MG/DL — HIGH (ref 0–12)
RBC # BLD: 3.42 M/UL — LOW (ref 3.8–5.2)
RBC # FLD: 16 % — HIGH (ref 10.3–14.5)
SODIUM SERPL-SCNC: 144 MMOL/L — SIGNIFICANT CHANGE UP (ref 135–145)
WBC # BLD: 13.68 K/UL — HIGH (ref 3.8–10.5)
WBC # FLD AUTO: 13.68 K/UL — HIGH (ref 3.8–10.5)

## 2019-12-12 PROCEDURE — 76775 US EXAM ABDO BACK WALL LIM: CPT | Mod: 26

## 2019-12-12 PROCEDURE — 99233 SBSQ HOSP IP/OBS HIGH 50: CPT

## 2019-12-12 RX ORDER — MAGNESIUM OXIDE 400 MG ORAL TABLET 241.3 MG
400 TABLET ORAL
Refills: 0 | Status: COMPLETED | OUTPATIENT
Start: 2019-12-12 | End: 2019-12-13

## 2019-12-12 RX ORDER — MIDODRINE HYDROCHLORIDE 2.5 MG/1
10 TABLET ORAL ONCE
Refills: 0 | Status: COMPLETED | OUTPATIENT
Start: 2019-12-12 | End: 2019-12-12

## 2019-12-12 RX ORDER — ATENOLOL 25 MG/1
25 TABLET ORAL DAILY
Refills: 0 | Status: DISCONTINUED | OUTPATIENT
Start: 2019-12-12 | End: 2019-12-12

## 2019-12-12 RX ORDER — SODIUM CHLORIDE 9 MG/ML
250 INJECTION INTRAMUSCULAR; INTRAVENOUS; SUBCUTANEOUS ONCE
Refills: 0 | Status: COMPLETED | OUTPATIENT
Start: 2019-12-12 | End: 2019-12-12

## 2019-12-12 RX ADMIN — HEPARIN SODIUM 5000 UNIT(S): 5000 INJECTION INTRAVENOUS; SUBCUTANEOUS at 17:00

## 2019-12-12 RX ADMIN — AMLODIPINE BESYLATE 2.5 MILLIGRAM(S): 2.5 TABLET ORAL at 07:47

## 2019-12-12 RX ADMIN — MAGNESIUM OXIDE 400 MG ORAL TABLET 400 MILLIGRAM(S): 241.3 TABLET ORAL at 17:00

## 2019-12-12 RX ADMIN — SODIUM CHLORIDE 250 MILLILITER(S): 9 INJECTION INTRAMUSCULAR; INTRAVENOUS; SUBCUTANEOUS at 16:51

## 2019-12-12 RX ADMIN — CEFTRIAXONE 100 MILLIGRAM(S): 500 INJECTION, POWDER, FOR SOLUTION INTRAMUSCULAR; INTRAVENOUS at 00:53

## 2019-12-12 RX ADMIN — SODIUM CHLORIDE 100 MILLILITER(S): 9 INJECTION INTRAMUSCULAR; INTRAVENOUS; SUBCUTANEOUS at 03:01

## 2019-12-12 RX ADMIN — AZITHROMYCIN 255 MILLIGRAM(S): 500 TABLET, FILM COATED ORAL at 13:57

## 2019-12-12 RX ADMIN — ATENOLOL 25 MILLIGRAM(S): 25 TABLET ORAL at 11:16

## 2019-12-12 RX ADMIN — Medication 81 MILLIGRAM(S): at 11:17

## 2019-12-12 RX ADMIN — LEVETIRACETAM 500 MILLIGRAM(S): 250 TABLET, FILM COATED ORAL at 07:47

## 2019-12-12 RX ADMIN — LEVETIRACETAM 500 MILLIGRAM(S): 250 TABLET, FILM COATED ORAL at 17:01

## 2019-12-12 RX ADMIN — Medication 1: at 11:17

## 2019-12-12 RX ADMIN — SODIUM CHLORIDE 100 MILLILITER(S): 9 INJECTION INTRAMUSCULAR; INTRAVENOUS; SUBCUTANEOUS at 16:51

## 2019-12-12 RX ADMIN — MIDODRINE HYDROCHLORIDE 10 MILLIGRAM(S): 2.5 TABLET ORAL at 16:59

## 2019-12-12 RX ADMIN — Medication 2: at 16:18

## 2019-12-12 RX ADMIN — HEPARIN SODIUM 5000 UNIT(S): 5000 INJECTION INTRAVENOUS; SUBCUTANEOUS at 07:47

## 2019-12-12 RX ADMIN — ATORVASTATIN CALCIUM 40 MILLIGRAM(S): 80 TABLET, FILM COATED ORAL at 22:13

## 2019-12-12 RX ADMIN — Medication 2: at 07:59

## 2019-12-12 NOTE — PROGRESS NOTE ADULT - ATTENDING COMMENTS
will need to clarify advanced directives d/w daughter at bedside kenn thrasher  number is [phone]

## 2019-12-12 NOTE — PROGRESS NOTE ADULT - SUBJECTIVE AND OBJECTIVE BOX
Subjective: remains rather lethargic.       MEDICATIONS  (STANDING):  amLODIPine   Tablet 2.5 milliGRAM(s) Oral daily  aspirin enteric coated 81 milliGRAM(s) Oral daily  ATENolol  Tablet 25 milliGRAM(s) Oral daily  atorvastatin 40 milliGRAM(s) Oral at bedtime  azithromycin  IVPB      azithromycin  IVPB 500 milliGRAM(s) IV Intermittent every 24 hours  cefTRIAXone   IVPB 1000 milliGRAM(s) IV Intermittent every 24 hours  dextrose 5%. 1000 milliLiter(s) (50 mL/Hr) IV Continuous <Continuous>  dextrose 50% Injectable 12.5 Gram(s) IV Push once  dextrose 50% Injectable 25 Gram(s) IV Push once  dextrose 50% Injectable 25 Gram(s) IV Push once  heparin  Injectable 5000 Unit(s) SubCutaneous every 12 hours  insulin lispro (HumaLOG) corrective regimen sliding scale   SubCutaneous three times a day before meals  levETIRAcetam 500 milliGRAM(s) Oral two times a day  magnesium oxide 400 milliGRAM(s) Oral two times a day with meals  sodium chloride 0.9%. 1000 milliLiter(s) (100 mL/Hr) IV Continuous <Continuous>    MEDICATIONS  (PRN):  dextrose 40% Gel 15 Gram(s) Oral once PRN Blood Glucose LESS THAN 70 milliGRAM(s)/deciliter  glucagon  Injectable 1 milliGRAM(s) IntraMuscular once PRN Glucose LESS THAN 70 milligrams/deciliter          T(C): 36.2 (19 @ 12:03), Max: 37.2 (19 @ 16:44)  HR: 93 (19 @ 12:03) (93 - 116)  BP: 91/60 (19 @ 12:03) (91/60 - 130/79)  RR: 17 (19 @ 12:03) (17 - 18)  SpO2: 100% (19 @ 12:03) (100% - 100%)  Wt(kg): --        I&O's Detail    11 Dec 2019 07:01  -  12 Dec 2019 07:00  --------------------------------------------------------  IN:    Oral Fluid: 240 mL    sodium chloride 0.9%.: 1200 mL  Total IN: 1440 mL    OUT:    Indwelling Catheter - Urethral: 1000 mL  Total OUT: 1000 mL    Total NET: 440 mL               PHYSICAL EXAM:    GENERAL: lethargic.   EYES: EOMI, PERRLA, conjunctiva and sclera clear  NECK: Supple, no inc in JVP  CHEST/LUNG: occ rhonchi  HEART: S1S2  ABDOMEN: Soft, Nontender, Nondistended; Bowel sounds present  EXTREMITIES:  min edema.         LABS:  CBC Full  -  ( 12 Dec 2019 07:50 )  WBC Count : 13.68 K/uL  RBC Count : 3.42 M/uL  Hemoglobin : 8.6 g/dL  Hematocrit : 28.1 %  Platelet Count - Automated : 166 K/uL  Mean Cell Volume : 82.2 fl  Mean Cell Hemoglobin : 25.1 pg  Mean Cell Hemoglobin Concentration : 30.6 gm/dL  Auto Neutrophil # : x  Auto Lymphocyte # : x  Auto Monocyte # : x  Auto Eosinophil # : x  Auto Basophil # : x  Auto Neutrophil % : x  Auto Lymphocyte % : x  Auto Monocyte % : x  Auto Eosinophil % : x  Auto Basophil % : x        144  |  116<H>  |  44<H>  ----------------------------<  195<H>  4.0   |  19<L>  |  2.02<H>    Ca    10.6<H>      12 Dec 2019 07:50  Phos  3.3     12-  Mg     1.7         TPro  6.9  /  Alb  x   /  TBili  x   /  DBili  x   /  AST  x   /  ALT  x   /  AlkPhos  x         Urinalysis Basic - ( 11 Dec 2019 13:27 )    Color: Yellow / Appearance: very cloudy / S.015 / pH: x  Gluc: x / Ketone: Negative  / Bili: Negative / Urobili: Negative mg/dL   Blood: x / Protein: 100 mg/dL / Nitrite: Negative   Leuk Esterase: Moderate / RBC: 11-25 /HPF / WBC TNTC /HPF   Sq Epi: x / Non Sq Epi: Few / Bacteria: Few      Culture Results:   >=3 organisms. Probable collection contamination. (12-10 @ 09:28)        Impression:  MARGARET pre/ post renal azotemia, ALANIZ  Hypercalcemia. PTH appropriately suppressed. DDx: immobilization, paraneoplastic     Recommendations:   Cont saline  Follow paraprotein,  PTHRP  Renal US to exclude nephrolithiasis

## 2019-12-12 NOTE — PROGRESS NOTE ADULT - SUBJECTIVE AND OBJECTIVE BOX
Patient is a 89y old  Female who presents with a chief complaint of Weakness. (10 Dec 2019 06:42)      OVERNIGHT EVENTS: none    MEDICATIONS  (STANDING):  amLODIPine   Tablet 2.5 milliGRAM(s) Oral daily  aspirin enteric coated 81 milliGRAM(s) Oral daily  atorvastatin 40 milliGRAM(s) Oral at bedtime  azithromycin  IVPB 500 milliGRAM(s) IV Intermittent every 24 hours  azithromycin  IVPB      cefTRIAXone   IVPB 1000 milliGRAM(s) IV Intermittent every 24 hours  dextrose 5%. 1000 milliLiter(s) (50 mL/Hr) IV Continuous <Continuous>  dextrose 50% Injectable 12.5 Gram(s) IV Push once  dextrose 50% Injectable 25 Gram(s) IV Push once  dextrose 50% Injectable 25 Gram(s) IV Push once  heparin  Injectable 5000 Unit(s) SubCutaneous every 12 hours  insulin lispro (HumaLOG) corrective regimen sliding scale   SubCutaneous three times a day before meals  levETIRAcetam 500 milliGRAM(s) Oral two times a day  sodium chloride 0.9%. 1000 milliLiter(s) (100 mL/Hr) IV Continuous <Continuous>    MEDICATIONS  (PRN):  dextrose 40% Gel 15 Gram(s) Oral once PRN Blood Glucose LESS THAN 70 milliGRAM(s)/deciliter  glucagon  Injectable 1 milliGRAM(s) IntraMuscular once PRN Glucose LESS THAN 70 milligrams/deciliter    Allergies    No Known Allergies    Intolerances        SUBJECTIVE: in bed in NAD, no acute events overnight     Vital Signs Last 24 Hrs  T(C): 36.7 (12 Dec 2019 05:39), Max: 37.2 (11 Dec 2019 16:44)  T(F): 98.1 (12 Dec 2019 05:39), Max: 99 (11 Dec 2019 16:44)  HR: 113 (12 Dec 2019 05:39) (109 - 122)  BP: 130/79 (12 Dec 2019 05:39) (102/62 - 130/79)  BP(mean): --  RR: 18 (12 Dec 2019 05:39) (17 - 18)  SpO2: 100% (12 Dec 2019 05:39) (97% - 100%)    PHYSICAL EXAM:    GENERAL: NAD, well-groomed, well-developed  HEAD:  Atraumatic, Normocephalic  EYES: EOMI, PERRLA, conjunctiva and sclera clear  ENMT: No tonsillar erythema, exudates, or enlargement; Moist mucous membranes, Good dentition, No lesions  NECK: Supple, No JVD, Normal thyroid  CHEST/LUNG: Clear to  auscultation bilaterally; No rales, rhonchi, wheezing, or rubs  bilaterally  HEART: Regular rate and rhythm; No murmurs, rubs, or gallops  ABDOMEN: Soft, Nontender, Nondistended; Bowel sounds present  EXTREMITIES:  2+ Peripheral Pulses, No clubbing, cyanosis, or edema BL LE    SKIN: No rashes or lesions  NERVOUS SYSTEM:  Alert & Oriented X1 demented follows commands ;   DTRs 2+ intact and symmetric, sensation intact BL    LABS:                                                           8.6    13.68 )-----------( 166      ( 12 Dec 2019 07:50 )             28.1       144  |  116<H>  |  44<H>  ----------------------------<  195<H>  4.0   |  19<L>  |  2.02<H>    Ca    10.6<H>      12 Dec 2019 07:50  Phos  3.3     12-12  Mg     1.7         TPro  6.9  /  Alb  x   /  TBili  x   /  DBili  x   /  AST  x   /  ALT  x   /  AlkPhos  x   12-11        Vitamin D, 1, 25-Dihydroxy (12.10.19 @ 14:48)    Vitamin D, 1, 25-Dihydroxy: 69.9 pg/mL    Parathyroid Hormone Intact, Serum (12.10.19 @ 12:39)    Intact PTH: 4: PTH METHOD: Roche  Guide for Interpretation of PTH and Calcium Results                           Calcium             PTH                           MG/DL               PG/ML  Normal                   8.4-10.5            15-65  Primary  Hyperparathyroidism      >10.5               >50  Non-PTH Hypercalcemia    >10.5               0-20  Hypoparathyroidism       <8.4                0-20  Pseudohypoparathyroid    <8.4                >50  This is intended as a guide only. Factors such as sunlight exposure,  Vitamin D status and renal function should be evaluated along with  clinical presentation. pg/mL      Cultures;   Urinalysis Basic - ( 11 Dec 2019 13:27 )    Color: Yellow / Appearance: very cloudy / S.015 / pH: x  Gluc: x / Ketone: Negative  / Bili: Negative / Urobili: Negative mg/dL   Blood: x / Protein: 100 mg/dL / Nitrite: Negative   Leuk Esterase: Moderate / RBC: 11-25 /HPF / WBC TNTC /HPF   Sq Epi: x / Non Sq Epi: Few / Bacteria: Few      Culture - Urine (collected 10 Dec 2019 09:28)  Source: .Urine Clean Catch (Midstream)  Final Report (11 Dec 2019 10:01):    >=3 organisms. Probable collection contamination.            Calcium, Ionized in AM (12..19 @ 09:18)    Calcium, Ionized: 1.59 mmoL/L    CAPILLARY BLOOD GLUCOSE      POCT Blood Glucose.: 206 mg/dL (12 Dec 2019 07:36)  POCT Blood Glucose.: 173 mg/dL (11 Dec 2019 22:59)  POCT Blood Glucose.: 242 mg/dL (11 Dec 2019 16:09)  POCT Blood Glucose.: 411 mg/dL (11 Dec 2019 10:42)  POCT Blood Glucose.: 411 mg/dL (11 Dec 2019 10:40)    Lipid panel:     CARDIAC MARKERS ( 09 Dec 2019 21:02 )  <.015 ng/mL / x     / x     / x     / x            RADIOLOGY & ADDITIONAL TESTS:      Imaging Personally Reviewed:  [ x] YES      Consultant(s) Notes Reviewed:  [x ] YES     Care Discussed with [x ] Consultants [X ] Patient [x ] Family  [x ]    [x ]  Other; RN

## 2019-12-12 NOTE — PROGRESS NOTE ADULT - SUBJECTIVE AND OBJECTIVE BOX
Patient is a 89y old  Female who presents with a chief complaint of Weakness. (12 Dec 2019 08:37)      Interval History: stable serum Calcium , decreased to 10.6   status post low dose Aredia   Humoral Hypercalcemia of malignancy     MEDICATIONS  (STANDING):  amLODIPine   Tablet 2.5 milliGRAM(s) Oral daily  aspirin enteric coated 81 milliGRAM(s) Oral daily  ATENolol  Tablet 25 milliGRAM(s) Oral daily  atorvastatin 40 milliGRAM(s) Oral at bedtime  azithromycin  IVPB      azithromycin  IVPB 500 milliGRAM(s) IV Intermittent every 24 hours  cefTRIAXone   IVPB 1000 milliGRAM(s) IV Intermittent every 24 hours  dextrose 5%. 1000 milliLiter(s) (50 mL/Hr) IV Continuous <Continuous>  dextrose 50% Injectable 12.5 Gram(s) IV Push once  dextrose 50% Injectable 25 Gram(s) IV Push once  dextrose 50% Injectable 25 Gram(s) IV Push once  heparin  Injectable 5000 Unit(s) SubCutaneous every 12 hours  insulin lispro (HumaLOG) corrective regimen sliding scale   SubCutaneous three times a day before meals  levETIRAcetam 500 milliGRAM(s) Oral two times a day  magnesium oxide 400 milliGRAM(s) Oral two times a day with meals  sodium chloride 0.9%. 1000 milliLiter(s) (100 mL/Hr) IV Continuous <Continuous>    MEDICATIONS  (PRN):  dextrose 40% Gel 15 Gram(s) Oral once PRN Blood Glucose LESS THAN 70 milliGRAM(s)/deciliter  glucagon  Injectable 1 milliGRAM(s) IntraMuscular once PRN Glucose LESS THAN 70 milligrams/deciliter      Allergies    No Known Allergies    Intolerances        REVIEW OF SYSTEMS: Could not be assessed    CONSTITUTIONAL: no changes  Vital Signs Last 24 Hrs  T(C): 36.7 (12 Dec 2019 05:39), Max: 37.2 (11 Dec 2019 16:44)  T(F): 98.1 (12 Dec 2019 05:39), Max: 99 (11 Dec 2019 16:44)  HR: 113 (12 Dec 2019 05:39) (109 - 122)  BP: 130/79 (12 Dec 2019 05:39) (102/62 - 130/79)  BP(mean): --  RR: 18 (12 Dec 2019 05:39) (17 - 18)  SpO2: 100% (12 Dec 2019 05:39) (97% - 100%)    PHYSICAL EXAM:  GENERAL: limited  HEAD: Atraumatic, Normocephalic  EYES: PERRLA, conjunctiva and sclera clear  ENMT: No tonsillar erythema, exudates, or enlargement; Moist mucous membranes, Good dentition, No lesions  NECK: Supple, No JVD, Normal thyroid  CHEST/LUNG: Clear to auscultaion bilaterally; No rales, rhonchi, wheezing, or rubs  HEART: Regular rate and rhythm; No murmurs, rubs, or gallops  ABDOMEN: Soft, Nontender, Nondistended; Bowel sounds present  EXTREMITIES:  2+ Peripheral Pulses, no edema  SKIN: No rashes or lesions    LABS:      Urinalysis Basic - ( 11 Dec 2019 13:27 )    Color: Yellow / Appearance: very cloudy / S.015 / pH: x  Gluc: x / Ketone: Negative  / Bili: Negative / Urobili: Negative mg/dL   Blood: x / Protein: 100 mg/dL / Nitrite: Negative   Leuk Esterase: Moderate / RBC: 11-25 /HPF / WBC TNTC /HPF   Sq Epi: x / Non Sq Epi: Few / Bacteria: Few      CAPILLARY BLOOD GLUCOSE      POCT Blood Glucose.: 192 mg/dL (12 Dec 2019 10:55)  POCT Blood Glucose.: 206 mg/dL (12 Dec 2019 07:36)  POCT Blood Glucose.: 173 mg/dL (11 Dec 2019 22:59)  POCT Blood Glucose.: 242 mg/dL (11 Dec 2019 16:09)    Lipid panel:           Thyroid:  Diabetes Tests:  Parathyroid Panel:  Adrenals:  RADIOLOGY & ADDITIONAL TESTS:    Imaging Personally Reviewed:  [ ] YES  [ ] NO    Consultant(s) Notes Reviewed:  [ ] YES  [ ] NO    Care Discussed with Consultants/Other Providers [ ] YES  [ ] NO

## 2019-12-12 NOTE — PROGRESS NOTE ADULT - PROBLEM SELECTOR PLAN 6
no old labs to compare    presuemd due to acute urinary retention see chava .  s/p de jesus on 12/11/19 and improving appreciate renal note     f/u spep and upep

## 2019-12-12 NOTE — PROGRESS NOTE ADULT - SUBJECTIVE AND OBJECTIVE BOX
S:    O: Vital Signs Last 24 Hrs  T(C): 37.1 (12 Dec 2019 16:51), Max: 37.1 (12 Dec 2019 16:51)  T(F): 98.7 (12 Dec 2019 16:51), Max: 98.7 (12 Dec 2019 16:51)  HR: 67 (12 Dec 2019 16:51) (67 - 115)  BP: 83/48 (12 Dec 2019 16:51) (83/48 - 130/79)  BP(mean): --  RR: 18 (12 Dec 2019 16:51) (17 - 18)  SpO2: 98% (12 Dec 2019 16:51) (98% - 100%)    Somnolent   ABD soft, No masses, non-tender  Jaimes in place; clear w/ sediment                          8.6    13.68 )-----------( 166      ( 12 Dec 2019 07:50 )             28.1   12-12    144  |  116<H>  |  44<H>  ----------------------------<  195<H>  4.0   |  19<L>  |  2.02<H>    Ca    10.6<H>      12 Dec 2019 07:50  Phos  3.3     12-12  Mg     1.7     12-12    TPro  6.9  /  Alb  x   /  TBili  x   /  DBili  x   /  AST  x   /  ALT  x   /  AlkPhos  x   12-11      Culture - Blood (collected 11 Dec 2019 14:15)  Source: .Blood Blood-Peripheral  Preliminary Report (12 Dec 2019 15:01):    No growth to date.    Culture - Urine (collected 10 Dec 2019 09:28)  Source: .Urine Clean Catch (Midstream)  Final Report (11 Dec 2019 10:01):    >=3 organisms. Probable collection contamination.    MEDICATIONS  (STANDING):  amLODIPine   Tablet 2.5 milliGRAM(s) Oral daily  aspirin enteric coated 81 milliGRAM(s) Oral daily  atorvastatin 40 milliGRAM(s) Oral at bedtime  azithromycin  IVPB 500 milliGRAM(s) IV Intermittent every 24 hours  azithromycin  IVPB      cefTRIAXone   IVPB 1000 milliGRAM(s) IV Intermittent every 24 hours  dextrose 5%. 1000 milliLiter(s) (50 mL/Hr) IV Continuous <Continuous>  dextrose 50% Injectable 12.5 Gram(s) IV Push once  dextrose 50% Injectable 25 Gram(s) IV Push once  dextrose 50% Injectable 25 Gram(s) IV Push once  heparin  Injectable 5000 Unit(s) SubCutaneous every 12 hours  insulin lispro (HumaLOG) corrective regimen sliding scale   SubCutaneous three times a day before meals  levETIRAcetam 500 milliGRAM(s) Oral two times a day  magnesium oxide 400 milliGRAM(s) Oral two times a day with meals  sodium chloride 0.9%. 1000 milliLiter(s) (100 mL/Hr) IV Continuous <Continuous>    MEDICATIONS  (PRN):  dextrose 40% Gel 15 Gram(s) Oral once PRN Blood Glucose LESS THAN 70 milliGRAM(s)/deciliter  glucagon  Injectable 1 milliGRAM(s) IntraMuscular once PRN Glucose LESS THAN 70 milligrams/deciliter

## 2019-12-12 NOTE — PROGRESS NOTE ADULT - PROBLEM SELECTOR PLAN 4
phosphorus level, PTH low ,   PTHRP pending   vitamin d level wnl ( low normal)   ? MM ? hyperthyroid check tsh wnl  free t4 pending   endocrine consult reviewed and appreciated s/p Jyoti on 12/11/19

## 2019-12-12 NOTE — PROGRESS NOTE ADULT - PROBLEM SELECTOR PLAN 2
f/u culture  continue with  antibiotic   wbc donwtrending f/u culture  continue with  antibiotic   wbc downtrending   get ct scan chest and abd/pelvis no contrsat Acute Renal Failure:  Start IVF 1/2 NS @ 83ml/hour for 24hrs, then re-evaluate.  Send urinalysis, urine lytes, spot urine creatine protien ratio, SPEP/UPEP.  Get Renal sonogram if the renal failure does not resolve in 24 hours.  Monitor BUN/Creatinine levels daily.  Renal Consult if the primary care physician agrees. and wont tolerate the oral contrast

## 2019-12-12 NOTE — PROGRESS NOTE ADULT - ASSESSMENT
89 y/o Female PMH of HTN, DM2, HL, seizure d/o, gout presents to ED for eval of increasing weakness and change in mental status over the past few weeks.  Family states some days pt is at her baseline, then other times she is more lethargic, noted drooling.  Family states pt taken to outside hospital twice for sx, work up neg & dc home.  Family states pt has been compliant w her meds, no known grand mal seizures.  No fall/head trauma.  Pt currently denies pain, denies all complaints; she is poor historian, on memantine, possible dementia. She is clinically dehydrated with elevated creatinine, has hypercalcemia, likely metabolic/septic encephalopathy.  I

## 2019-12-12 NOTE — PROGRESS NOTE ADULT - PROBLEM SELECTOR PLAN 2
Continue with the same regimen while inpatient   finger sticks are better   Patient can resume  home regimen upon discharge

## 2019-12-12 NOTE — PROGRESS NOTE ADULT - PROBLEM SELECTOR PLAN 7
now presumed to be due to UTI and less likely pna.   continue both antibx for now .    f/u urine cx and blood cx  wbc is improving on antibx

## 2019-12-13 DIAGNOSIS — I50.9 HEART FAILURE, UNSPECIFIED: ICD-10-CM

## 2019-12-13 LAB
% ALBUMIN: 44.9 % — SIGNIFICANT CHANGE UP
% ALPHA 1: 8 % — SIGNIFICANT CHANGE UP
% ALPHA 2: 20.7 % — SIGNIFICANT CHANGE UP
% BETA: 19.8 % — SIGNIFICANT CHANGE UP
% GAMMA, URINE: 7.1 % — SIGNIFICANT CHANGE UP
% GAMMA: 6.6 % — SIGNIFICANT CHANGE UP
% M SPIKE: 0 % — SIGNIFICANT CHANGE UP
-  AMPICILLIN: SIGNIFICANT CHANGE UP
-  CIPROFLOXACIN: SIGNIFICANT CHANGE UP
-  LEVOFLOXACIN: SIGNIFICANT CHANGE UP
-  NITROFURANTOIN: SIGNIFICANT CHANGE UP
-  TETRACYCLINE: SIGNIFICANT CHANGE UP
-  VANCOMYCIN: SIGNIFICANT CHANGE UP
ALBUMIN 24H MFR UR ELPH: 43.2 % — SIGNIFICANT CHANGE UP
ALBUMIN SERPL ELPH-MCNC: 3.1 G/DL — LOW (ref 3.6–5.5)
ALBUMIN/GLOB SERPL ELPH: 0.8 RATIO — SIGNIFICANT CHANGE UP
ALPHA1 GLOB 24H MFR UR ELPH: 21.5 % — SIGNIFICANT CHANGE UP
ALPHA1 GLOB SERPL ELPH-MCNC: 0.6 G/DL — HIGH (ref 0.1–0.4)
ALPHA2 GLOB 24H MFR UR ELPH: 10.9 % — SIGNIFICANT CHANGE UP
ALPHA2 GLOB SERPL ELPH-MCNC: 1.4 G/DL — HIGH (ref 0.5–1)
ANION GAP SERPL CALC-SCNC: 6 MMOL/L — SIGNIFICANT CHANGE UP (ref 5–17)
B-GLOBULIN 24H MFR UR ELPH: 17.3 % — SIGNIFICANT CHANGE UP
B-GLOBULIN SERPL ELPH-MCNC: 1.4 G/DL — HIGH (ref 0.5–1)
BUN SERPL-MCNC: 45 MG/DL — HIGH (ref 7–23)
CALCIUM SERPL-MCNC: 10 MG/DL — SIGNIFICANT CHANGE UP (ref 8.5–10.1)
CHLORIDE SERPL-SCNC: 119 MMOL/L — HIGH (ref 96–108)
CO2 SERPL-SCNC: 20 MMOL/L — LOW (ref 22–31)
CREAT SERPL-MCNC: 1.71 MG/DL — HIGH (ref 0.5–1.3)
CULTURE RESULTS: SIGNIFICANT CHANGE UP
EOSINOPHIL NFR URNS MANUAL: POSITIVE
GAMMA GLOBULIN: 0.5 G/DL — LOW (ref 0.6–1.6)
GLUCOSE BLDC GLUCOMTR-MCNC: 145 MG/DL — HIGH (ref 70–99)
GLUCOSE BLDC GLUCOMTR-MCNC: 146 MG/DL — HIGH (ref 70–99)
GLUCOSE BLDC GLUCOMTR-MCNC: 170 MG/DL — HIGH (ref 70–99)
GLUCOSE BLDC GLUCOMTR-MCNC: 247 MG/DL — HIGH (ref 70–99)
GLUCOSE BLDC GLUCOMTR-MCNC: 269 MG/DL — HIGH (ref 70–99)
GLUCOSE SERPL-MCNC: 159 MG/DL — HIGH (ref 70–99)
INTERPRETATION 24H UR IFE-IMP: SIGNIFICANT CHANGE UP
INTERPRETATION 24H UR IFE-IMP: SIGNIFICANT CHANGE UP
INTERPRETATION SERPL IFE-IMP: SIGNIFICANT CHANGE UP
M PROTEIN 24H UR ELPH-MRATE: SIGNIFICANT CHANGE UP
M-SPIKE: 0 G/DL — SIGNIFICANT CHANGE UP (ref 0–0)
METHOD TYPE: SIGNIFICANT CHANGE UP
ORGANISM # SPEC MICROSCOPIC CNT: SIGNIFICANT CHANGE UP
ORGANISM # SPEC MICROSCOPIC CNT: SIGNIFICANT CHANGE UP
POTASSIUM SERPL-MCNC: 3.9 MMOL/L — SIGNIFICANT CHANGE UP (ref 3.5–5.3)
POTASSIUM SERPL-SCNC: 3.9 MMOL/L — SIGNIFICANT CHANGE UP (ref 3.5–5.3)
PROT ?TM UR-MCNC: 44 MG/DL — HIGH (ref 0–12)
PROT ?TM UR-MCNC: 75 MG/DL — HIGH (ref 0–12)
PROT PATTERN 24H UR ELPH-IMP: SIGNIFICANT CHANGE UP
PROT PATTERN SERPL ELPH-IMP: SIGNIFICANT CHANGE UP
SODIUM SERPL-SCNC: 145 MMOL/L — SIGNIFICANT CHANGE UP (ref 135–145)
SPECIMEN SOURCE: SIGNIFICANT CHANGE UP
T4 FREE SERPL-MCNC: 1.2 NG/DL — SIGNIFICANT CHANGE UP (ref 0.9–1.8)
TOTAL VOLUME - 24 HOUR: SIGNIFICANT CHANGE UP ML
URINE CREATININE CALCULATION: SIGNIFICANT CHANGE UP G/24 H (ref 0.8–1.8)

## 2019-12-13 PROCEDURE — 71250 CT THORAX DX C-: CPT | Mod: 26

## 2019-12-13 PROCEDURE — 93971 EXTREMITY STUDY: CPT | Mod: 26,LT

## 2019-12-13 PROCEDURE — 99233 SBSQ HOSP IP/OBS HIGH 50: CPT

## 2019-12-13 PROCEDURE — 74176 CT ABD & PELVIS W/O CONTRAST: CPT | Mod: 26

## 2019-12-13 RX ORDER — FUROSEMIDE 40 MG
10 TABLET ORAL ONCE
Refills: 0 | Status: COMPLETED | OUTPATIENT
Start: 2019-12-13 | End: 2019-12-13

## 2019-12-13 RX ORDER — IPRATROPIUM/ALBUTEROL SULFATE 18-103MCG
3 AEROSOL WITH ADAPTER (GRAM) INHALATION EVERY 6 HOURS
Refills: 0 | Status: DISCONTINUED | OUTPATIENT
Start: 2019-12-13 | End: 2019-12-21

## 2019-12-13 RX ADMIN — HEPARIN SODIUM 5000 UNIT(S): 5000 INJECTION INTRAVENOUS; SUBCUTANEOUS at 05:31

## 2019-12-13 RX ADMIN — Medication 10 MILLIGRAM(S): at 18:29

## 2019-12-13 RX ADMIN — ATORVASTATIN CALCIUM 40 MILLIGRAM(S): 80 TABLET, FILM COATED ORAL at 23:31

## 2019-12-13 RX ADMIN — MAGNESIUM OXIDE 400 MG ORAL TABLET 400 MILLIGRAM(S): 241.3 TABLET ORAL at 08:25

## 2019-12-13 RX ADMIN — LEVETIRACETAM 500 MILLIGRAM(S): 250 TABLET, FILM COATED ORAL at 05:31

## 2019-12-13 RX ADMIN — Medication 81 MILLIGRAM(S): at 12:24

## 2019-12-13 RX ADMIN — Medication 2: at 18:29

## 2019-12-13 RX ADMIN — HEPARIN SODIUM 5000 UNIT(S): 5000 INJECTION INTRAVENOUS; SUBCUTANEOUS at 18:30

## 2019-12-13 RX ADMIN — Medication 3: at 12:44

## 2019-12-13 RX ADMIN — CEFTRIAXONE 100 MILLIGRAM(S): 500 INJECTION, POWDER, FOR SOLUTION INTRAMUSCULAR; INTRAVENOUS at 00:09

## 2019-12-13 RX ADMIN — AZITHROMYCIN 255 MILLIGRAM(S): 500 TABLET, FILM COATED ORAL at 09:01

## 2019-12-13 RX ADMIN — LEVETIRACETAM 500 MILLIGRAM(S): 250 TABLET, FILM COATED ORAL at 18:48

## 2019-12-13 NOTE — PROGRESS NOTE ADULT - SUBJECTIVE AND OBJECTIVE BOX
Patient is a 89y old  Female who presents with a chief complaint of Weakness. (10 Dec 2019 06:42)      OVERNIGHT EVENTS: none    MEDICATIONS  (STANDING):  amLODIPine   Tablet 2.5 milliGRAM(s) Oral daily  aspirin enteric coated 81 milliGRAM(s) Oral daily  atorvastatin 40 milliGRAM(s) Oral at bedtime  azithromycin  IVPB 500 milliGRAM(s) IV Intermittent every 24 hours  azithromycin  IVPB      cefTRIAXone   IVPB 1000 milliGRAM(s) IV Intermittent every 24 hours  dextrose 5%. 1000 milliLiter(s) (50 mL/Hr) IV Continuous <Continuous>  dextrose 50% Injectable 12.5 Gram(s) IV Push once  dextrose 50% Injectable 25 Gram(s) IV Push once  dextrose 50% Injectable 25 Gram(s) IV Push once  heparin  Injectable 5000 Unit(s) SubCutaneous every 12 hours  insulin lispro (HumaLOG) corrective regimen sliding scale   SubCutaneous three times a day before meals  levETIRAcetam 500 milliGRAM(s) Oral two times a day  sodium chloride 0.9%. 1000 milliLiter(s) (100 mL/Hr) IV Continuous <Continuous>    MEDICATIONS  (PRN):  dextrose 40% Gel 15 Gram(s) Oral once PRN Blood Glucose LESS THAN 70 milliGRAM(s)/deciliter  glucagon  Injectable 1 milliGRAM(s) IntraMuscular once PRN Glucose LESS THAN 70 milligrams/deciliter    Allergies    No Known Allergies    Intolerances        SUBJECTIVE: in bed in NAD, no acute events overnight     Vital Signs Last 24 Hrs  T(C): 36.7 (12 Dec 2019 05:39), Max: 37.2 (11 Dec 2019 16:44)  T(F): 98.1 (12 Dec 2019 05:39), Max: 99 (11 Dec 2019 16:44)  HR: 113 (12 Dec 2019 05:39) (109 - 122)  BP: 130/79 (12 Dec 2019 05:39) (102/62 - 130/79)  BP(mean): --  RR: 18 (12 Dec 2019 05:39) (17 - 18)  SpO2: 100% (12 Dec 2019 05:39) (97% - 100%)    PHYSICAL EXAM:    GENERAL: NAD, well-groomed, well-developed  HEAD:  Atraumatic, Normocephalic  EYES: EOMI, PERRLA, conjunctiva and sclera clear  ENMT: No tonsillar erythema, exudates, or enlargement; Moist mucous membranes, Good dentition, No lesions  NECK: Supple, No JVD, Normal thyroid  CHEST/LUNG: Clear to  auscultation bilaterally; No rales, rhonchi, wheezing, or rubs  bilaterally  HEART: Regular rate and rhythm; No murmurs, rubs, or gallops  ABDOMEN: Soft, Nontender, Nondistended; Bowel sounds present  EXTREMITIES:  2+ Peripheral Pulses, No clubbing, cyanosis, or edema BL LE    SKIN: No rashes or lesions  NERVOUS SYSTEM:  Alert & Oriented X1 demented follows commands ;   DTRs 2+ intact and symmetric, sensation intact BL    LABS:                                                           8.6    13.68 )-----------( 166      ( 12 Dec 2019 07:50 )             28.1       144  |  116<H>  |  44<H>  ----------------------------<  195<H>  4.0   |  19<L>  |  2.02<H>    Ca    10.6<H>      12 Dec 2019 07:50  Phos  3.3     12-12  Mg     1.7         TPro  6.9  /  Alb  x   /  TBili  x   /  DBili  x   /  AST  x   /  ALT  x   /  AlkPhos  x   12-11        Vitamin D, 1, 25-Dihydroxy (12.10.19 @ 14:48)    Vitamin D, 1, 25-Dihydroxy: 69.9 pg/mL    Parathyroid Hormone Intact, Serum (12.10.19 @ 12:39)    Intact PTH: 4: PTH METHOD: Roche  Guide for Interpretation of PTH and Calcium Results                           Calcium             PTH                           MG/DL               PG/ML  Normal                   8.4-10.5            15-65  Primary  Hyperparathyroidism      >10.5               >50  Non-PTH Hypercalcemia    >10.5               0-20  Hypoparathyroidism       <8.4                0-20  Pseudohypoparathyroid    <8.4                >50  This is intended as a guide only. Factors such as sunlight exposure,  Vitamin D status and renal function should be evaluated along with  clinical presentation. pg/mL      Cultures;   Urinalysis Basic - ( 11 Dec 2019 13:27 )    Color: Yellow / Appearance: very cloudy / S.015 / pH: x  Gluc: x / Ketone: Negative  / Bili: Negative / Urobili: Negative mg/dL   Blood: x / Protein: 100 mg/dL / Nitrite: Negative   Leuk Esterase: Moderate / RBC: 11-25 /HPF / WBC TNTC /HPF   Sq Epi: x / Non Sq Epi: Few / Bacteria: Few      Culture - Urine (collected 10 Dec 2019 09:28)  Source: .Urine Clean Catch (Midstream)  Final Report (11 Dec 2019 10:01):    >=3 organisms. Probable collection contamination.            Calcium, Ionized in AM (12..19 @ 09:18)    Calcium, Ionized: 1.59 mmoL/L    CAPILLARY BLOOD GLUCOSE      POCT Blood Glucose.: 206 mg/dL (12 Dec 2019 07:36)  POCT Blood Glucose.: 173 mg/dL (11 Dec 2019 22:59)  POCT Blood Glucose.: 242 mg/dL (11 Dec 2019 16:09)  POCT Blood Glucose.: 411 mg/dL (11 Dec 2019 10:42)  POCT Blood Glucose.: 411 mg/dL (11 Dec 2019 10:40)    Lipid panel:     CARDIAC MARKERS ( 09 Dec 2019 21:02 )  <.015 ng/mL / x     / x     / x     / x            RADIOLOGY & ADDITIONAL TESTS:      Imaging Personally Reviewed:  [ x] YES      Consultant(s) Notes Reviewed:  [x ] YES     Care Discussed with [x ] Consultants [X ] Patient [x ] Family  [x ]    [x ]  Other; RN Patient is a 89y old  Female who presents with a chief complaint of Weakness. (10 Dec 2019 06:42)      OVERNIGHT EVENTS: none    MEDICATIONS  (STANDING):  amLODIPine   Tablet 2.5 milliGRAM(s) Oral daily  aspirin enteric coated 81 milliGRAM(s) Oral daily  atorvastatin 40 milliGRAM(s) Oral at bedtime  azithromycin  IVPB 500 milliGRAM(s) IV Intermittent every 24 hours  azithromycin  IVPB      cefTRIAXone   IVPB 1000 milliGRAM(s) IV Intermittent every 24 hours  dextrose 5%. 1000 milliLiter(s) (50 mL/Hr) IV Continuous <Continuous>  dextrose 50% Injectable 12.5 Gram(s) IV Push once  dextrose 50% Injectable 25 Gram(s) IV Push once  dextrose 50% Injectable 25 Gram(s) IV Push once  heparin  Injectable 5000 Unit(s) SubCutaneous every 12 hours  insulin lispro (HumaLOG) corrective regimen sliding scale   SubCutaneous three times a day before meals  levETIRAcetam 500 milliGRAM(s) Oral two times a day  sodium chloride 0.9%. 1000 milliLiter(s) (100 mL/Hr) IV Continuous <Continuous>    MEDICATIONS  (PRN):  dextrose 40% Gel 15 Gram(s) Oral once PRN Blood Glucose LESS THAN 70 milliGRAM(s)/deciliter  glucagon  Injectable 1 milliGRAM(s) IntraMuscular once PRN Glucose LESS THAN 70 milligrams/deciliter    Allergies    No Known Allergies    Intolerances        SUBJECTIVE: in bed in NAD, no acute events overnight     Vital Signs Last 24 Hrs  T(C): 36.7 (12 Dec 2019 05:39), Max: 37.2 (11 Dec 2019 16:44)  T(F): 98.1 (12 Dec 2019 05:39), Max: 99 (11 Dec 2019 16:44)  HR: 113 (12 Dec 2019 05:39) (109 - 122)  BP: 130/79 (12 Dec 2019 05:39) (102/62 - 130/79)  BP(mean): --  RR: 18 (12 Dec 2019 05:39) (17 - 18)  SpO2: 100% (12 Dec 2019 05:39) (97% - 100%)    PHYSICAL EXAM:    GENERAL: NAD, well-groomed, well-developed  HEAD:  Atraumatic, Normocephalic  EYES: EOMI, PERRLA, conjunctiva and sclera clear  ENMT: No tonsillar erythema, exudates, or enlargement; Moist mucous membranes, Good dentition, No lesions  NECK: Supple, No JVD, Normal thyroid  CHEST/LUNG:  scattered wheezing   HEART: Regular rate and rhythm; No murmurs, rubs, or gallops  ABDOMEN: Soft, Nontender, Nondistended; Bowel sounds present  EXTREMITIES:  2+ Peripheral Pulses, No clubbing, cyanosis, or edema BL LE    SKIN: No rashes or lesions  NERVOUS SYSTEM:  Alert & Oriented X1 demented follows commands ;   DTRs 2+ intact and symmetric, sensation intact BL    LABS:                                                           8.6    13.68 )-----------( 166      ( 12 Dec 2019 07:50 )             28.1   12-    144  |  116<H>  |  44<H>  ----------------------------<  195<H>  4.0   |  19<L>  |  2.02<H>    Ca    10.6<H>      12 Dec 2019 07:50  Phos  3.3     12-12  Mg     1.7     12    TPro  6.9  /  Alb  x   /  TBili  x   /  DBili  x   /  AST  x   /  ALT  x   /  AlkPhos  x   12-11        Vitamin D, 1, 25-Dihydroxy (12.10.19 @ 14:48)    Vitamin D, 1, 25-Dihydroxy: 69.9 pg/mL    Parathyroid Hormone Intact, Serum (12.10.19 @ 12:39)    Intact PTH: 4: PTH METHOD: Roche  Guide for Interpretation of PTH and Calcium Results                           Calcium             PTH                           MG/DL               PG/ML  Normal                   8.4-10.5            15-65  Primary  Hyperparathyroidism      >10.5               >50  Non-PTH Hypercalcemia    >10.5               0-20  Hypoparathyroidism       <8.4                0-20  Pseudohypoparathyroid    <8.4                >50  This is intended as a guide only. Factors such as sunlight exposure,  Vitamin D status and renal function should be evaluated along with  clinical presentation. pg/mL      Cultures;   Urinalysis Basic - ( 11 Dec 2019 13:27 )    Color: Yellow / Appearance: very cloudy / S.015 / pH: x  Gluc: x / Ketone: Negative  / Bili: Negative / Urobili: Negative mg/dL   Blood: x / Protein: 100 mg/dL / Nitrite: Negative   Leuk Esterase: Moderate / RBC: 11-25 /HPF / WBC TNTC /HPF   Sq Epi: x / Non Sq Epi: Few / Bacteria: Few      Culture - Urine (collected 10 Dec 2019 09:28)  Source: .Urine Clean Catch (Midstream)  Final Report (11 Dec 2019 10:01):    >=3 organisms. Probable collection contamination.            Calcium, Ionized in AM (12..19 @ 09:18)    Calcium, Ionized: 1.59 mmoL/L    CAPILLARY BLOOD GLUCOSE      POCT Blood Glucose.: 206 mg/dL (12 Dec 2019 07:36)  POCT Blood Glucose.: 173 mg/dL (11 Dec 2019 22:59)  POCT Blood Glucose.: 242 mg/dL (11 Dec 2019 16:09)  POCT Blood Glucose.: 411 mg/dL (11 Dec 2019 10:42)  POCT Blood Glucose.: 411 mg/dL (11 Dec 2019 10:40)    Lipid panel:     CARDIAC MARKERS ( 09 Dec 2019 21:02 )  <.015 ng/mL / x     / x     / x     / x            RADIOLOGY & ADDITIONAL TESTS:      Imaging Personally Reviewed:  [ x] YES      Consultant(s) Notes Reviewed:  [x ] YES     Care Discussed with [x ] Consultants [X ] Patient [x ] Family  [x ]    [x ]  Other; RN

## 2019-12-13 NOTE — PROGRESS NOTE ADULT - ATTENDING COMMENTS
d/w daughter at bedside kenn thrasher  number is [phone] d/w daughter at bedside dinora best  number is [phone]

## 2019-12-13 NOTE — PROGRESS NOTE ADULT - PROBLEM SELECTOR PLAN 2
f/u culture  continue with  antibiotic   wbc downtrending   get ct scan chest and abd/pelvis no contrsat Acute Renal Failure:  Start IVF 1/2 NS @ 83ml/hour for 24hrs, then re-evaluate.  Send urinalysis, urine lytes, spot urine creatine protien ratio, SPEP/UPEP.  Get Renal sonogram if the renal failure does not resolve in 24 hours.  Monitor BUN/Creatinine levels daily.  Renal Consult if the primary care physician agrees. and wont tolerate the oral contrast

## 2019-12-13 NOTE — CONSULT NOTE ADULT - SUBJECTIVE AND OBJECTIVE BOX
HPI:  89 y/o Female PMH of HTN, DM2, HL, seizure d/o, gout presents to ED for eval of increasing weakness and change in mental status over the past few weeks.  Family states some days pt is at her baseline, then other times she is more lethargic, noted drooling.  Family states pt taken to outside hospital twice for sx, work up neg & dc home.  Family states pt has been compliant w her meds, no known grand mal seizures.  No fall/head trauma.  Pt currently denies pain, denies all complaints; she is poor historian, on memantine, possible dementia. (10 Dec 2019 06:42)  ---------------------------- As Above ---------------------------------------------------- Patient seen earlier today  Called to see patient regarding abnormal CT scan w/o oral contrast. Stomach appears lobulated. The patient has no symptoms to suggest any stomach pathology.  The patient ( daughter ) denies melena, hematochezia, hematemesis, nausea, vomiting, abdominal pain, diarrhea, or change in bowel movements. The patient occasionally gets constipated followed by diarrhea.   ASA (+) No family history of colon cancer. Never had a colonoscopy. No weight loss.    PAST MEDICAL & SURGICAL HISTORY:  DM (diabetes mellitus)  Hyperlipidemia  Gout  HTN (hypertension)  CVA (cerebral vascular accident): april 2019  No significant past surgical history      MEDICATIONS  (STANDING):  atorvastatin 40 milliGRAM(s) Oral at bedtime  azithromycin  IVPB 500 milliGRAM(s) IV Intermittent every 24 hours  azithromycin  IVPB      cefTRIAXone   IVPB 1000 milliGRAM(s) IV Intermittent every 24 hours  dextrose 5%. 1000 milliLiter(s) (50 mL/Hr) IV Continuous <Continuous>  dextrose 50% Injectable 12.5 Gram(s) IV Push once  dextrose 50% Injectable 25 Gram(s) IV Push once  dextrose 50% Injectable 25 Gram(s) IV Push once  heparin  Injectable 5000 Unit(s) SubCutaneous every 12 hours  insulin lispro (HumaLOG) corrective regimen sliding scale   SubCutaneous three times a day before meals  levETIRAcetam 500 milliGRAM(s) Oral two times a day    MEDICATIONS  (PRN):  albuterol/ipratropium for Nebulization. 3 milliLiter(s) Nebulizer every 6 hours PRN Shortness of Breath and/or Wheezing  dextrose 40% Gel 15 Gram(s) Oral once PRN Blood Glucose LESS THAN 70 milliGRAM(s)/deciliter  glucagon  Injectable 1 milliGRAM(s) IntraMuscular once PRN Glucose LESS THAN 70 milligrams/deciliter      Allergies    No Known Allergies    Intolerances        FAMILY HISTORY:  No pertinent family history in first degree relatives      REVIEW OF SYSTEMS:    CONSTITUTIONAL: No fever, weight loss,   EYES: No eye pain, visual disturbances, or discharge  ENMT:  No difficulty hearing, tinnitus, vertigo; No sinus or throat pain  NECK: No pain or stiffness  BREASTS: No pain, masses, or nipple discharge  RESPIRATORY: No cough, wheezing, chills or hemoptysis; No shortness of breath  CARDIOVASCULAR: No chest pain, palpitations, dizziness, or leg swelling  GASTROINTESTINAL: See above  GENITOURINARY: No dysuria, frequency, hematuria, or incontinence  NEUROLOGICAL: No headaches, memory loss, loss of strength, numbness, or tremors  SKIN: No itching, burning, rashes, or lesions   LYMPH NODES: No enlarged glands  ENDOCRINE: No heat or cold intolerance; No hair loss  MUSCULOSKELETAL: No joint pain or swelling; No muscle, back, or extremity pain  PSYCHIATRIC: No depression, anxiety, mood swings, or difficulty sleeping  HEME/LYMPH: No easy bruising, or bleeding gums  ALLERGY AND IMMUNOLOGIC: No hives or eczema          SOCIAL HISTORY:    FAMILY HISTORY:  No pertinent family history in first degree relatives      Vital Signs Last 24 Hrs  T(C): 36.8 (13 Dec 2019 17:28), Max: 37.1 (13 Dec 2019 05:20)  T(F): 98.3 (13 Dec 2019 17:28), Max: 98.7 (13 Dec 2019 05:20)  HR: 73 (13 Dec 2019 17:28) (63 - 73)  BP: 119/55 (13 Dec 2019 17:28) (99/52 - 119/55)  BP(mean): --  RR: 18 (13 Dec 2019 17:28) (17 - 19)  SpO2: 100% (13 Dec 2019 17:28) (95% - 100%)    PHYSICAL EXAM:    GENERAL: NAD, well-groomed, well-developed  HEAD:  Atraumatic, Normocephalic  EYES: EOMI, PERRLA, conjunctiva and sclera clear  NECK: Supple, No JVD, Normal thyroid  NERVOUS SYSTEM:  Alert & Oriented X3, Good concentration;   CHEST/LUNG: Clear to percussion bilaterally; No rales, rhonchi, wheezing, or rubs  HEART: Regular rate and rhythm; No murmurs, rubs, or gallops  ABDOMEN: Soft, Nontender, Nondistended; Bowel sounds present  EXTREMITIES:  2+ Peripheral Pulses, No clubbing, cyanosis, or edema  LYMPH: No lymphadenopathy noted   RECTAL:  deferred   SKIN: No rashes or lesions    LABS:                        8.6    13.68 )-----------( 166      ( 12 Dec 2019 07:50 )             28.1       CBC:  12-12 @ 07:50  WBC  13.68  HGB 8.6  HCT 28.1 Plate 166  MCV 82.2  12-11 @ 10:42  WBC  18.28  HGB 9.1  HCT 28.8 Plate 230  MCV 78.0  12-11 @ 07:11  WBC  17.92  HGB 9.7  HCT 30.9 Plate 247  MCV 79.2  12-09 @ 21:02  WBC  7.03  HGB 8.6  HCT 26.8 Plate 209  MCV 79.1           13 Dec 2019 07:18    145    |  119    |  45     ----------------------------<  159    3.9     |  20     |  1.71   12 Dec 2019 07:50    144    |  116    |  44     ----------------------------<  195    4.0     |  19     |  2.02   11 Dec 2019 10:42    141    |  110    |  55     ----------------------------<  409    4.5     |  17     |  3.52   11 Dec 2019 07:11    139    |  107    |  47     ----------------------------<  344    4.8     |  18     |  3.01   10 Dec 2019 10:23    141    |  107    |  34     ----------------------------<  122    3.5     |  22     |  1.32   09 Dec 2019 21:02    139    |  107    |  40     ----------------------------<  103    4.3     |  23     |  1.58     Ca    10.0       13 Dec 2019 07:18  Ca    10.6       12 Dec 2019 07:50  Ca    10.8       11 Dec 2019 10:42  Ca    11.1       11 Dec 2019 07:11  Ca    11.8       10 Dec 2019 10:23  Ca    11.8       09 Dec 2019 21:02  Phos  3.3       12 Dec 2019 07:50  Phos  5.3       11 Dec 2019 07:11  Phos  3.4       10 Dec 2019 10:23  Mg     1.7       12 Dec 2019 07:50  Mg     1.8       11 Dec 2019 07:11    TPro  6.9    /  Alb  3.1    /  TBili  x      /  DBili  x      /  AST  x      /  ALT  x      /  AlkPhos  x      11 Dec 2019 13:37  TPro  6.7    /  Alb  2.8    /  TBili  0.2    /  DBili  x      /  AST  26     /  ALT  23     /  AlkPhos  103    09 Dec 2019 21:02            RADIOLOGY & ADDITIONAL STUDIES:

## 2019-12-13 NOTE — CONSULT NOTE ADULT - ASSESSMENT
HPI:  89 y/o Female PMH of HTN, DM2, HL, seizure d/o, gout presents to ED for eval of increasing weakness and change in mental status over the past few weeks.  Family states some days pt is at her baseline, then other times she is more lethargic, noted drooling.  Family states pt taken to outside hospital twice for sx, work up neg & dc home.  Family states pt has been compliant w her meds, no known grand mal seizures.  No fall/head trauma.  Pt currently denies pain, denies all complaints; she is poor historian, on memantine, possible dementia. (10 Dec 2019 06:42)  ---------------------------- As Above ---------------------------------------------------- Patient seen earlier today  Called to see patient regarding abnormal CT scan w/o oral contrast. Stomach appears lobulated. The patient has no symptoms to suggest any stomach pathology.  The patient ( daughter ) denies melena, hematochezia, hematemesis, nausea, vomiting, abdominal pain, diarrhea, or change in bowel movements. The patient occasionally gets constipated followed by diarrhea.   ASA (+) No family history of colon cancer. Never had a colonoscopy. No weight loss.    Abnormal tomach on CT scan w/o contrast. Reviewed with radiologist. Will arrange for EGD on Monday. Discussed with daughters for 10 minutes. Consent obtained.

## 2019-12-13 NOTE — PROGRESS NOTE ADULT - PROBLEM SELECTOR PLAN 1
controlled  while inpt continue ANDREINA TID-AC  upon d/c can not resume metformin as creatinine >1.5  may be able to be d/c on cho controlled diet alone vs DPP4 inhibitor like tradjenta

## 2019-12-13 NOTE — PROGRESS NOTE ADULT - SUBJECTIVE AND OBJECTIVE BOX
[facility] NEPHROLOGY SERVICES, [facility]  NEPHROLOGY AND HYPERTENSION  [address]  [address]  [place] [address]  [phone]    MD MOE JAMES MD ANDREY GONCHARUK, MD MADHU KORRAPATI, MD YELENA ROSENBERG, MD BINNY KOSHY, MD CHRISTOPHER CAPUTO, MD TIMO RICHARD MD          Patient feels well no complaints today.    MEDICATIONS  (STANDING):  amLODIPine   Tablet 2.5 milliGRAM(s) Oral daily  aspirin enteric coated 81 milliGRAM(s) Oral daily  atorvastatin 40 milliGRAM(s) Oral at bedtime  azithromycin  IVPB 500 milliGRAM(s) IV Intermittent every 24 hours  azithromycin  IVPB      cefTRIAXone   IVPB 1000 milliGRAM(s) IV Intermittent every 24 hours  dextrose 5%. 1000 milliLiter(s) (50 mL/Hr) IV Continuous <Continuous>  dextrose 50% Injectable 12.5 Gram(s) IV Push once  dextrose 50% Injectable 25 Gram(s) IV Push once  dextrose 50% Injectable 25 Gram(s) IV Push once  furosemide   Injectable 10 milliGRAM(s) IV Push once  heparin  Injectable 5000 Unit(s) SubCutaneous every 12 hours  insulin lispro (HumaLOG) corrective regimen sliding scale   SubCutaneous three times a day before meals  levETIRAcetam 500 milliGRAM(s) Oral two times a day    MEDICATIONS  (PRN):  albuterol/ipratropium for Nebulization. 3 milliLiter(s) Nebulizer every 6 hours PRN Shortness of Breath and/or Wheezing  dextrose 40% Gel 15 Gram(s) Oral once PRN Blood Glucose LESS THAN 70 milliGRAM(s)/deciliter  glucagon  Injectable 1 milliGRAM(s) IntraMuscular once PRN Glucose LESS THAN 70 milligrams/deciliter      19 @ 07:01  -  19 @ 07:00  --------------------------------------------------------  IN: 1200 mL / OUT: 1000 mL / NET: 200 mL      PHYSICAL EXAM:      T(C): 36.4 (19 @ 12:15), Max: 37.1 (19 @ 16:51)  HR: 70 (19 @ 12:15) (63 - 76)  BP: 102/51 (19 @ 15:12) (83/48 - 109/53)  RR: 19 (19 @ 15:12) (17 - 19)  SpO2: 100% (19 @ 15:12) (95% - 100%)  Wt(kg): --  Respiratory: clear anteriorly, decreased BS at bases  Cardiovascular: S1 S2  Gastrointestinal: soft NT ND +BS  Extremities:  tr edema                                    8.6    13.68 )-----------( 166      ( 12 Dec 2019 07:50 )             28.1     12    145  |  119<H>  |  45<H>  ----------------------------<  159<H>  3.9   |  20<L>  |  1.71<H>    Ca    10.0      13 Dec 2019 07:18  Phos  3.3       Mg     1.7         < from: US Renal (19 @ 10:00) >  IMPRESSION:     Questionable punctate nonobstructing right renal calculus. Otherwise,   unremarkable renal ultrasound.      < from: CT Abdomen and Pelvis No Cont (19 @ 11:26) >    IMPRESSION:     No definite mass identified within the limitations of this noncontrast   exam.    Lobular contour of the proximal stomach, possibly related to   underdistention. Further evaluation with oral contrast enhanced scan or   upper endoscopy can be obtained.     Indeterminate lucent lesion within the T6 vertebral body with associated   age-indeterminate mild compression deformity. Comparison with prior   imaging is recommended. Further evaluation can be obtained with MRI.    Mild splenomegaly.    Small left pleural effusion. Mild interlobular septal thickening in the   lung apices may reflect mild pulmonary edema.      Protein Electrophoresis, Urine (19 @ 17:47)    Bence Angela/Protein, Urine: Absent    Immunoelectrophoresis, Serum (19 @ 11:07)    Quantitative Ig mg/dL    Quantitative IgA: 99 mg/dL    Quantitative IgM: 38 mg/dL    АННА Kappa: 1.55 mg/dL    АННА Lambda: 1.66 mg/dL    Kappa/Lambda Free Light Chain Ratio, Serum: 0.93 Ratio        Creatinine Trend: 1.71<--, 2.02<--, 3.52<--, 3.01<--, 1.32<--, 1.58<--    Assessment   MARGARET pre/ post renal azotemia, ALANIZ  Hypercalcemia, r/o paraneoplastic' bone source  Improving     Plan    Jaimes  IVF NS;   Follow paraprotein,  pthrp studies;           Mateo Fatima MD [facility] NEPHROLOGY SERVICES, [facility]  NEPHROLOGY AND HYPERTENSION  [address]  [address]  [place] [address]  [phone]    MD MOE JAMES MD ANDREY GONCHARUK, MD MADHU KORRAPATI, MD YELENA ROSENBERG, MD BINNY KOSHY, MD CHRISTOPHER CAPUTO, MD TIMO RICHARD MD          Patient feels well no complaints today.    MEDICATIONS  (STANDING):  amLODIPine   Tablet 2.5 milliGRAM(s) Oral daily  aspirin enteric coated 81 milliGRAM(s) Oral daily  atorvastatin 40 milliGRAM(s) Oral at bedtime  azithromycin  IVPB 500 milliGRAM(s) IV Intermittent every 24 hours  azithromycin  IVPB      cefTRIAXone   IVPB 1000 milliGRAM(s) IV Intermittent every 24 hours  dextrose 5%. 1000 milliLiter(s) (50 mL/Hr) IV Continuous <Continuous>  dextrose 50% Injectable 12.5 Gram(s) IV Push once  dextrose 50% Injectable 25 Gram(s) IV Push once  dextrose 50% Injectable 25 Gram(s) IV Push once  furosemide   Injectable 10 milliGRAM(s) IV Push once  heparin  Injectable 5000 Unit(s) SubCutaneous every 12 hours  insulin lispro (HumaLOG) corrective regimen sliding scale   SubCutaneous three times a day before meals  levETIRAcetam 500 milliGRAM(s) Oral two times a day    MEDICATIONS  (PRN):  albuterol/ipratropium for Nebulization. 3 milliLiter(s) Nebulizer every 6 hours PRN Shortness of Breath and/or Wheezing  dextrose 40% Gel 15 Gram(s) Oral once PRN Blood Glucose LESS THAN 70 milliGRAM(s)/deciliter  glucagon  Injectable 1 milliGRAM(s) IntraMuscular once PRN Glucose LESS THAN 70 milligrams/deciliter      19 @ 07:01  -  19 @ 07:00  --------------------------------------------------------  IN: 1200 mL / OUT: 1000 mL / NET: 200 mL      PHYSICAL EXAM:      T(C): 36.4 (19 @ 12:15), Max: 37.1 (19 @ 16:51)  HR: 70 (19 @ 12:15) (63 - 76)  BP: 102/51 (19 @ 15:12) (83/48 - 109/53)  RR: 19 (19 @ 15:12) (17 - 19)  SpO2: 100% (19 @ 15:12) (95% - 100%)  Wt(kg): --  Respiratory: clear anteriorly, decreased BS at bases  Cardiovascular: S1 S2  Gastrointestinal: soft NT ND +BS  Extremities:  tr edema                                    8.6    13.68 )-----------( 166      ( 12 Dec 2019 07:50 )             28.1     12    145  |  119<H>  |  45<H>  ----------------------------<  159<H>  3.9   |  20<L>  |  1.71<H>    Ca    10.0      13 Dec 2019 07:18  Phos  3.3       Mg     1.7         < from: US Renal (19 @ 10:00) >  IMPRESSION:     Questionable punctate nonobstructing right renal calculus. Otherwise,   unremarkable renal ultrasound.      < from: CT Abdomen and Pelvis No Cont (19 @ 11:26) >    IMPRESSION:     No definite mass identified within the limitations of this noncontrast   exam.    Lobular contour of the proximal stomach, possibly related to   underdistention. Further evaluation with oral contrast enhanced scan or   upper endoscopy can be obtained.     Indeterminate lucent lesion within the T6 vertebral body with associated   age-indeterminate mild compression deformity. Comparison with prior   imaging is recommended. Further evaluation can be obtained with MRI.    Mild splenomegaly.    Small left pleural effusion. Mild interlobular septal thickening in the   lung apices may reflect mild pulmonary edema.      Protein Electrophoresis, Urine (19 @ 17:47)    Bence Angela/Protein, Urine: Absent    Immunoelectrophoresis, Serum (19 @ 11:07)    Quantitative Ig mg/dL    Quantitative IgA: 99 mg/dL    Quantitative IgM: 38 mg/dL    АННА Kappa: 1.55 mg/dL    АННА Lambda: 1.66 mg/dL    Kappa/Lambda Free Light Chain Ratio, Serum: 0.93 Ratio        Creatinine Trend: 1.71<--, 2.02<--, 3.52<--, 3.01<--, 1.32<--, 1.58<--    Assessment   MARGARET pre/ post renal azotemia, ALANIZ  Hypercalcemia, r/o paraneoplastic' bone source  Improving     Plan  IVF on hold.  Jaimes  Follow paraprotein,  pthrp studies;           Mateo Fatima MD

## 2019-12-13 NOTE — PROGRESS NOTE ADULT - PROBLEM SELECTOR PLAN 6
no old labs to compare    presuemd due to acute urinary retention see chava .  s/p de jesus on 12/11/19 and improving appreciate renal note     f/u spep and upep no old labs to compare    presumed due to acute urinary retention see chava .  s/p de jesus on 12/11/19 and improving appreciate renal note     f/u spep and upep

## 2019-12-14 LAB
ANION GAP SERPL CALC-SCNC: 6 MMOL/L — SIGNIFICANT CHANGE UP (ref 5–17)
BUN SERPL-MCNC: 43 MG/DL — HIGH (ref 7–23)
CA-I BLD-SCNC: 1.43 MMOL/L — HIGH (ref 1.12–1.3)
CALCIUM SERPL-MCNC: 9.7 MG/DL — SIGNIFICANT CHANGE UP (ref 8.5–10.1)
CHLORIDE SERPL-SCNC: 120 MMOL/L — HIGH (ref 96–108)
CO2 SERPL-SCNC: 22 MMOL/L — SIGNIFICANT CHANGE UP (ref 22–31)
CREAT SERPL-MCNC: 1.51 MG/DL — HIGH (ref 0.5–1.3)
GLUCOSE BLDC GLUCOMTR-MCNC: 137 MG/DL — HIGH (ref 70–99)
GLUCOSE BLDC GLUCOMTR-MCNC: 173 MG/DL — HIGH (ref 70–99)
GLUCOSE BLDC GLUCOMTR-MCNC: 178 MG/DL — HIGH (ref 70–99)
GLUCOSE BLDC GLUCOMTR-MCNC: 222 MG/DL — HIGH (ref 70–99)
GLUCOSE SERPL-MCNC: 137 MG/DL — HIGH (ref 70–99)
HCT VFR BLD CALC: 24.1 % — LOW (ref 34.5–45)
HGB BLD-MCNC: 7.2 G/DL — LOW (ref 11.5–15.5)
M PROTEIN 24H UR ELPH-MRATE: SIGNIFICANT CHANGE UP
MCHC RBC-ENTMCNC: 24.2 PG — LOW (ref 27–34)
MCHC RBC-ENTMCNC: 29.9 GM/DL — LOW (ref 32–36)
MCV RBC AUTO: 81.1 FL — SIGNIFICANT CHANGE UP (ref 80–100)
NRBC # BLD: 0 /100 WBCS — SIGNIFICANT CHANGE UP (ref 0–0)
PLATELET # BLD AUTO: 141 K/UL — LOW (ref 150–400)
POTASSIUM SERPL-MCNC: 3.9 MMOL/L — SIGNIFICANT CHANGE UP (ref 3.5–5.3)
POTASSIUM SERPL-SCNC: 3.9 MMOL/L — SIGNIFICANT CHANGE UP (ref 3.5–5.3)
PROT SERPL-MCNC: 5.5 G/DL — LOW (ref 6–8.3)
PROT SERPL-MCNC: 5.5 G/DL — LOW (ref 6–8.3)
PTH RELATED PROT SERPL-MCNC: <2 PMOL/L — SIGNIFICANT CHANGE UP
RBC # BLD: 2.97 M/UL — LOW (ref 3.8–5.2)
RBC # FLD: 16.1 % — HIGH (ref 10.3–14.5)
SODIUM SERPL-SCNC: 148 MMOL/L — HIGH (ref 135–145)
WBC # BLD: 7.67 K/UL — SIGNIFICANT CHANGE UP (ref 3.8–10.5)
WBC # FLD AUTO: 7.67 K/UL — SIGNIFICANT CHANGE UP (ref 3.8–10.5)

## 2019-12-14 PROCEDURE — 99233 SBSQ HOSP IP/OBS HIGH 50: CPT

## 2019-12-14 RX ADMIN — Medication 1: at 16:39

## 2019-12-14 RX ADMIN — AZITHROMYCIN 255 MILLIGRAM(S): 500 TABLET, FILM COATED ORAL at 08:50

## 2019-12-14 RX ADMIN — LEVETIRACETAM 500 MILLIGRAM(S): 250 TABLET, FILM COATED ORAL at 17:10

## 2019-12-14 RX ADMIN — HEPARIN SODIUM 5000 UNIT(S): 5000 INJECTION INTRAVENOUS; SUBCUTANEOUS at 06:21

## 2019-12-14 RX ADMIN — ATORVASTATIN CALCIUM 40 MILLIGRAM(S): 80 TABLET, FILM COATED ORAL at 21:04

## 2019-12-14 RX ADMIN — HEPARIN SODIUM 5000 UNIT(S): 5000 INJECTION INTRAVENOUS; SUBCUTANEOUS at 17:10

## 2019-12-14 RX ADMIN — LEVETIRACETAM 500 MILLIGRAM(S): 250 TABLET, FILM COATED ORAL at 06:21

## 2019-12-14 RX ADMIN — Medication 2: at 12:15

## 2019-12-14 RX ADMIN — CEFTRIAXONE 100 MILLIGRAM(S): 500 INJECTION, POWDER, FOR SOLUTION INTRAMUSCULAR; INTRAVENOUS at 04:14

## 2019-12-14 NOTE — PROGRESS NOTE ADULT - ATTENDING COMMENTS
d/w daughter at bedside [facility]  number is [phone] d/w daughters at bedside [facility]  number is [phone]  12/14/19 they are not on same page regarding dnr status. they will review patient  living will and discuss more in detail

## 2019-12-14 NOTE — CONSULT NOTE ADULT - PROBLEM SELECTOR RECOMMENDATION 9
Anemia  Splenomegaly  Hypercalcemia  Bone Lesion.  R/O Lymphoproliferative Disorder.  Bone Marrow Biopsy.
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

## 2019-12-14 NOTE — GOALS OF CARE CONVERSATION - ADVANCED CARE PLANNING - CONVERSATION DETAILS
discussed with sisters if  they have dnr dni status / advanced directives  . They are in disagreement with each other and will review pataiets living will and come back to me to discuss further

## 2019-12-14 NOTE — PROGRESS NOTE ADULT - PROBLEM SELECTOR PLAN 7
improving s/p de jesus on 12/11/19   f/u renal images    urine cx enterococcus faecalis   appreciate urology note improving s/p de jesus on 12/11/19   renal images unremarkable    urine cx enterococcus faecalis   appreciate urology note  12/14/19 d/w urology keep de jesus for now

## 2019-12-14 NOTE — PROGRESS NOTE ADULT - ASSESSMENT
89 female with a history of HTN, CAD, DM. PVD, CVA now with PNA and Chidi.   Renal indices are now close to baseline. Continue present treatment.

## 2019-12-14 NOTE — PROGRESS NOTE ADULT - PROBLEM SELECTOR PLAN 4
now presumed to be due to UTI and less likely pna.   continue both antibx for now .    urine cx  enterococcus faecalis  and blood cx ngtd   wbc is improving on antibx

## 2019-12-14 NOTE — PROGRESS NOTE ADULT - PROBLEM SELECTOR PLAN 8
checking echo pending   d/c ivf   s/p lasix times one  on 12/13/19     duo nebs improved   checking echo pending   d/c ivf   s/p lasix times one  on 12/13/19     duo nebs

## 2019-12-14 NOTE — PROGRESS NOTE ADULT - SUBJECTIVE AND OBJECTIVE BOX
WILL JOSUE  89y  Female    Patient is a 89y old  Female who presents with a chief complaint of Weakness. (14 Dec 2019 11:29)    awake and alert.       PAST MEDICAL & SURGICAL HISTORY:  DM (diabetes mellitus)  Hyperlipidemia  Gout  HTN (hypertension)  CVA (cerebral vascular accident): april 2019  No significant past surgical history          PHYSICAL EXAM:    T(C): 37 (12-14-19 @ 16:58), Max: 37 (12-14-19 @ 16:58)  HR: 79 (12-14-19 @ 16:58) (75 - 90)  BP: 127/54 (12-14-19 @ 16:58) (112/52 - 134/62)  RR: 18 (12-14-19 @ 16:58) (16 - 18)  SpO2: 100% (12-14-19 @ 16:58) (99% - 100%)  Wt(kg): --    I&O's Detail    13 Dec 2019 07:01  -  14 Dec 2019 07:00  --------------------------------------------------------  IN:  Total IN: 0 mL    OUT:    Indwelling Catheter - Urethral: 500 mL  Total OUT: 500 mL    Total NET: -500 mL      14 Dec 2019 07:01  -  14 Dec 2019 17:44  --------------------------------------------------------  IN:  Total IN: 0 mL    OUT:    Indwelling Catheter - Urethral: 700 mL  Total OUT: 700 mL    Total NET: -700 mL          Respiratory: clear anteriorly, decreased BS at bases  Cardiovascular: S1 S2  Gastrointestinal: soft NT ND +BS  Extremities: trace edema   Neuro: Awake and alert    MEDICATIONS  (STANDING):  atorvastatin 40 milliGRAM(s) Oral at bedtime  azithromycin  IVPB 500 milliGRAM(s) IV Intermittent every 24 hours  azithromycin  IVPB      cefTRIAXone   IVPB 1000 milliGRAM(s) IV Intermittent every 24 hours  dextrose 5%. 1000 milliLiter(s) (50 mL/Hr) IV Continuous <Continuous>  dextrose 50% Injectable 12.5 Gram(s) IV Push once  dextrose 50% Injectable 25 Gram(s) IV Push once  dextrose 50% Injectable 25 Gram(s) IV Push once  heparin  Injectable 5000 Unit(s) SubCutaneous every 12 hours  insulin lispro (HumaLOG) corrective regimen sliding scale   SubCutaneous three times a day before meals  levETIRAcetam 500 milliGRAM(s) Oral two times a day    MEDICATIONS  (PRN):  albuterol/ipratropium for Nebulization. 3 milliLiter(s) Nebulizer every 6 hours PRN Shortness of Breath and/or Wheezing  dextrose 40% Gel 15 Gram(s) Oral once PRN Blood Glucose LESS THAN 70 milliGRAM(s)/deciliter  glucagon  Injectable 1 milliGRAM(s) IntraMuscular once PRN Glucose LESS THAN 70 milligrams/deciliter                            7.2    7.67  )-----------( 141      ( 14 Dec 2019 07:40 )             24.1       12-14    148<H>  |  120<H>  |  43<H>  ----------------------------<  137<H>  3.9   |  22  |  1.51<H>    Ca    9.7      14 Dec 2019 07:40        Creatinine Trend: Creatinine Trend: 1.51<--, 1.71<--, 2.02<--, 3.52<--, 3.01<--, 1.32<--

## 2019-12-14 NOTE — PROGRESS NOTE ADULT - PROBLEM SELECTOR PLAN 6
no old labs to compare    presumed due to acute urinary retention see below .  s/p de jesus on 12/11/19 and improving appreciate renal note    eosinophils positive

## 2019-12-14 NOTE — PROGRESS NOTE ADULT - PROBLEM SELECTOR PLAN 1
phosphorus level, PTH low ,   PTHRP pending   vitamin d level wnl ( low normal)  ruled out hyperthyroid check tsh wnl  free t4 wnl   spep and upep noted no m spike  no bence israel protein    endocrine consult reviewed and appreciated s/p Aredia on 12/11/19 12/14/19 consulted heme/onc   12/13/19 obtained ct chest abd pelvis ? lobular stomach therefore consulted gi plan for egd on Monday phosphorus level, PTH low ,   PTHRP pending   vitamin d level wnl ( low normal)  ruled out hyperthyroid check tsh wnl  free t4 wnl   spep and upep noted no m spike  no bence israel protein    endocrine consult reviewed and appreciated s/p Aredia on 12/11/19 12/13/19 obtained ct chest abd pelvis ? lobular stomach therefore consulted gi plan for egd on Monday 12/14/19 consulted heme/onc and recc for BM biopsy daughter agree but ask can be done on Tuesday after egd,

## 2019-12-14 NOTE — PROGRESS NOTE ADULT - SUBJECTIVE AND OBJECTIVE BOX
Patient is a 89y old  Female who presents with a chief complaint of Weakness. (10 Dec 2019 06:42)      OVERNIGHT EVENTS: none    MEDICATIONS  (STANDING):  atorvastatin 40 milliGRAM(s) Oral at bedtime  azithromycin  IVPB 500 milliGRAM(s) IV Intermittent every 24 hours  azithromycin  IVPB      cefTRIAXone   IVPB 1000 milliGRAM(s) IV Intermittent every 24 hours  dextrose 5%. 1000 milliLiter(s) (50 mL/Hr) IV Continuous <Continuous>  dextrose 50% Injectable 12.5 Gram(s) IV Push once  dextrose 50% Injectable 25 Gram(s) IV Push once  dextrose 50% Injectable 25 Gram(s) IV Push once  heparin  Injectable 5000 Unit(s) SubCutaneous every 12 hours  insulin lispro (HumaLOG) corrective regimen sliding scale   SubCutaneous three times a day before meals  levETIRAcetam 500 milliGRAM(s) Oral two times a day    MEDICATIONS  (PRN):  albuterol/ipratropium for Nebulization. 3 milliLiter(s) Nebulizer every 6 hours PRN Shortness of Breath and/or Wheezing  dextrose 40% Gel 15 Gram(s) Oral once PRN Blood Glucose LESS THAN 70 milliGRAM(s)/deciliter  glucagon  Injectable 1 milliGRAM(s) IntraMuscular once PRN Glucose LESS THAN 70 milligrams/deciliter    Allergies    No Known Allergies    Intolerances        SUBJECTIVE: in bed in NAD, no acute events overnight     Vital Signs Last 24 Hrs  T(C): 36.9 (14 Dec 2019 05:10), Max: 36.9 (14 Dec 2019 05:10)  T(F): 98.5 (14 Dec 2019 05:10), Max: 98.5 (14 Dec 2019 05:10)  HR: 78 (14 Dec 2019 05:10) (70 - 78)  BP: 127/64 (14 Dec 2019 05:10) (102/51 - 127/64)  BP(mean): --  RR: 16 (14 Dec 2019 05:10) (16 - 19)  SpO2: 99% (14 Dec 2019 05:10) (99% - 100%)    PHYSICAL EXAM:    GENERAL: NAD, well-groomed, well-developed  HEAD:  Atraumatic, Normocephalic  EYES: EOMI, PERRLA, conjunctiva and sclera clear  ENMT: No tonsillar erythema, exudates, or enlargement; Moist mucous membranes, Good dentition, No lesions  NECK: Supple, No JVD, Normal thyroid  CHEST/LUNG:  scattered wheezing   HEART: Regular rate and rhythm; No murmurs, rubs, or gallops  ABDOMEN: Soft, Nontender, Nondistended; Bowel sounds present  EXTREMITIES:  2+ Peripheral Pulses, No clubbing, cyanosis, or edema BL LE    SKIN: No rashes or lesions  NERVOUS SYSTEM:  Alert & Oriented X1 demented follows commands ;   DTRs 2+ intact and symmetric, sensation intact BL    LABS:                                                                7.2    7.67  )-----------( 141      ( 14 Dec 2019 07:40 )             24.1   12-14    148<H>  |  120<H>  |  43<H>  ----------------------------<  137<H>  3.9   |  22  |  1.51<H>    Ca    9.7      14 Dec 2019 07:40            Vitamin D, 1, 25-Dihydroxy (12.10.19 @ 14:48)    Vitamin D, 1, 25-Dihydroxy: 69.9 pg/mL    Parathyroid Hormone Intact, Serum (12.10.19 @ 12:39)    Intact PTH: 4: PTH METHOD: Roche  Guide for Interpretation of PTH and Calcium Results                           Calcium             PTH                           MG/DL               PG/ML  Normal                   8.4-10.5            15-65  Primary  Hyperparathyroidism      >10.5               >50  Non-PTH Hypercalcemia    >10.5               0-20  Hypoparathyroidism       <8.4                0-20  Pseudohypoparathyroid    <8.4                >50  This is intended as a guide only. Factors such as sunlight exposure,  Vitamin D status and renal function should be evaluated along with  clinical presentation. pg/mL      Cultures;   Urinalysis Basic - ( 11 Dec 2019 13:27 )    Color: Yellow / Appearance: very cloudy / S.015 / pH: x  Gluc: x / Ketone: Negative  / Bili: Negative / Urobili: Negative mg/dL   Blood: x / Protein: 100 mg/dL / Nitrite: Negative   Leuk Esterase: Moderate / RBC: 11-25 /HPF / WBC TNTC /HPF   Sq Epi: x / Non Sq Epi: Few / Bacteria: Few      Culture - Urine (collected 10 Dec 2019 09:28)  Source: .Urine Clean Catch (Midstream)  Final Report (11 Dec 2019 10:01):    >=3 organisms. Probable collection contamination.            Calcium, Ionized in AM (12.11.19 @ 09:18)    Calcium, Ionized: 1.59 mmoL/L    CAPILLARY BLOOD GLUCOSE      POCT Blood Glucose.: 206 mg/dL (12 Dec 2019 07:36)  POCT Blood Glucose.: 173 mg/dL (11 Dec 2019 22:59)  POCT Blood Glucose.: 242 mg/dL (11 Dec 2019 16:09)  POCT Blood Glucose.: 411 mg/dL (11 Dec 2019 10:42)  POCT Blood Glucose.: 411 mg/dL (11 Dec 2019 10:40)    Lipid panel:     CARDIAC MARKERS ( 09 Dec 2019 21:02 )  <.015 ng/mL / x     / x     / x     / x            RADIOLOGY & ADDITIONAL TESTS:      Imaging Personally Reviewed:  [ x] YES      Consultant(s) Notes Reviewed:  [x ] YES     Care Discussed with [x ] Consultants [X ] Patient [x ] Family  [x ]    [x ]  Other; RN Patient is a 89y old  Female who presents with a chief complaint of Weakness. (10 Dec 2019 06:42)      OVERNIGHT EVENTS: none    MEDICATIONS  (STANDING):  atorvastatin 40 milliGRAM(s) Oral at bedtime  azithromycin  IVPB 500 milliGRAM(s) IV Intermittent every 24 hours  azithromycin  IVPB      cefTRIAXone   IVPB 1000 milliGRAM(s) IV Intermittent every 24 hours  dextrose 5%. 1000 milliLiter(s) (50 mL/Hr) IV Continuous <Continuous>  dextrose 50% Injectable 12.5 Gram(s) IV Push once  dextrose 50% Injectable 25 Gram(s) IV Push once  dextrose 50% Injectable 25 Gram(s) IV Push once  heparin  Injectable 5000 Unit(s) SubCutaneous every 12 hours  insulin lispro (HumaLOG) corrective regimen sliding scale   SubCutaneous three times a day before meals  levETIRAcetam 500 milliGRAM(s) Oral two times a day    MEDICATIONS  (PRN):  albuterol/ipratropium for Nebulization. 3 milliLiter(s) Nebulizer every 6 hours PRN Shortness of Breath and/or Wheezing  dextrose 40% Gel 15 Gram(s) Oral once PRN Blood Glucose LESS THAN 70 milliGRAM(s)/deciliter  glucagon  Injectable 1 milliGRAM(s) IntraMuscular once PRN Glucose LESS THAN 70 milligrams/deciliter    Allergies    No Known Allergies    Intolerances        SUBJECTIVE: in bed in NAD, no acute events overnight     Vital Signs Last 24 Hrs  T(C): 36.9 (14 Dec 2019 05:10), Max: 36.9 (14 Dec 2019 05:10)  T(F): 98.5 (14 Dec 2019 05:10), Max: 98.5 (14 Dec 2019 05:10)  HR: 78 (14 Dec 2019 05:10) (70 - 78)  BP: 127/64 (14 Dec 2019 05:10) (102/51 - 127/64)  BP(mean): --  RR: 16 (14 Dec 2019 05:10) (16 - 19)  SpO2: 99% (14 Dec 2019 05:10) (99% - 100%)    PHYSICAL EXAM:    GENERAL: NAD, well-groomed, well-developed  HEAD:  Atraumatic, Normocephalic  EYES: EOMI, PERRLA, conjunctiva and sclera clear  ENMT: No tonsillar erythema, exudates, or enlargement; Moist mucous membranes, Good dentition, No lesions  NECK: Supple, No JVD, Normal thyroid  CHEST/LUNG:  improved air entry no wheezing or rhonchi    HEART: Regular rate and rhythm; No murmurs, rubs, or gallops  ABDOMEN: Soft, Nontender, Nondistended; Bowel sounds present  EXTREMITIES:  2+ Peripheral Pulses, No clubbing, cyanosis, or edema BL LE    SKIN: No rashes or lesions  NERVOUS SYSTEM:  Alert & Oriented X1 demented follows commands ;   DTRs 2+ intact and symmetric, sensation intact BL    LABS:                                                                7.2    7.67  )-----------( 141      ( 14 Dec 2019 07:40 )             24.1   12-14    148<H>  |  120<H>  |  43<H>  ----------------------------<  137<H>  3.9   |  22  |  1.51<H>    Ca    9.7      14 Dec 2019 07:40            Vitamin D, 1, 25-Dihydroxy (12.10.19 @ 14:48)    Vitamin D, 1, 25-Dihydroxy: 69.9 pg/mL    Parathyroid Hormone Intact, Serum (12.10.19 @ 12:39)    Intact PTH: 4: PTH METHOD: Roche  Guide for Interpretation of PTH and Calcium Results                           Calcium             PTH                           MG/DL               PG/ML  Normal                   8.4-10.5            15-65  Primary  Hyperparathyroidism      >10.5               >50  Non-PTH Hypercalcemia    >10.5               0-20  Hypoparathyroidism       <8.4                0-20  Pseudohypoparathyroid    <8.4                >50  This is intended as a guide only. Factors such as sunlight exposure,  Vitamin D status and renal function should be evaluated along with  clinical presentation. pg/mL      Cultures;   Urinalysis Basic - ( 11 Dec 2019 13:27 )    Color: Yellow / Appearance: very cloudy / S.015 / pH: x  Gluc: x / Ketone: Negative  / Bili: Negative / Urobili: Negative mg/dL   Blood: x / Protein: 100 mg/dL / Nitrite: Negative   Leuk Esterase: Moderate / RBC: 11-25 /HPF / WBC TNTC /HPF   Sq Epi: x / Non Sq Epi: Few / Bacteria: Few      Culture - Urine (collected 10 Dec 2019 09:28)  Source: .Urine Clean Catch (Midstream)  Final Report (11 Dec 2019 10:01):    >=3 organisms. Probable collection contamination.            Calcium, Ionized in AM (. @ 09:18)    Calcium, Ionized: 1.59 mmoL/L    CAPILLARY BLOOD GLUCOSE      POCT Blood Glucose.: 206 mg/dL (12 Dec 2019 07:36)  POCT Blood Glucose.: 173 mg/dL (11 Dec 2019 22:59)  POCT Blood Glucose.: 242 mg/dL (11 Dec 2019 16:09)  POCT Blood Glucose.: 411 mg/dL (11 Dec 2019 10:42)  POCT Blood Glucose.: 411 mg/dL (11 Dec 2019 10:40)    Lipid panel:     CARDIAC MARKERS ( 09 Dec 2019 21:02 )  <.015 ng/mL / x     / x     / x     / x            RADIOLOGY & ADDITIONAL TESTS:      Imaging Personally Reviewed:  [ x] YES      Consultant(s) Notes Reviewed:  [x ] YES     Care Discussed with [x ] Consultants [X ] Patient [x ] Family  [x ]    [x ]  Other; RN

## 2019-12-14 NOTE — PROGRESS NOTE ADULT - SUBJECTIVE AND OBJECTIVE BOX
Patient is a 89y old  Female who presents with a chief complaint of Weakness. (13 Dec 2019 20:17)      INTERVAL HPI/OVERNIGHT EVENTS:  pt resting comfortably in bed  NAD  no complaints    MEDICATIONS  (STANDING):  atorvastatin 40 milliGRAM(s) Oral at bedtime  azithromycin  IVPB 500 milliGRAM(s) IV Intermittent every 24 hours  azithromycin  IVPB      cefTRIAXone   IVPB 1000 milliGRAM(s) IV Intermittent every 24 hours  dextrose 5%. 1000 milliLiter(s) (50 mL/Hr) IV Continuous <Continuous>  dextrose 50% Injectable 12.5 Gram(s) IV Push once  dextrose 50% Injectable 25 Gram(s) IV Push once  dextrose 50% Injectable 25 Gram(s) IV Push once  heparin  Injectable 5000 Unit(s) SubCutaneous every 12 hours  insulin lispro (HumaLOG) corrective regimen sliding scale   SubCutaneous three times a day before meals  levETIRAcetam 500 milliGRAM(s) Oral two times a day    MEDICATIONS  (PRN):  albuterol/ipratropium for Nebulization. 3 milliLiter(s) Nebulizer every 6 hours PRN Shortness of Breath and/or Wheezing  dextrose 40% Gel 15 Gram(s) Oral once PRN Blood Glucose LESS THAN 70 milliGRAM(s)/deciliter  glucagon  Injectable 1 milliGRAM(s) IntraMuscular once PRN Glucose LESS THAN 70 milligrams/deciliter      REVIEW OF SYSTEMS:  CONSTITUTIONAL: No fever, weight loss, or fatigue  RESPIRATORY: No cough, wheezing, chills or hemoptysis; No shortness of breath  CARDIOVASCULAR: No chest pain, palpitations, dizziness, or leg swelling  GASTROINTESTINAL: No abdominal or epigastric pain. No nausea, vomiting, or hematemesis; No diarrhea or constipation. No melena or hematochezia.  ENDOCRINE: No heat or cold intolerance; No hair loss      Vital Signs Last 24 Hrs  T(C): 36.9 (14 Dec 2019 05:10), Max: 36.9 (14 Dec 2019 05:10)  T(F): 98.5 (14 Dec 2019 05:10), Max: 98.5 (14 Dec 2019 05:10)  HR: 78 (14 Dec 2019 05:10) (70 - 78)  BP: 127/64 (14 Dec 2019 05:10) (102/51 - 127/64)  BP(mean): --  RR: 16 (14 Dec 2019 05:10) (16 - 19)  SpO2: 99% (14 Dec 2019 05:10) (99% - 100%)    PHYSICAL EXAM:  GENERAL: NAD, well-groomed, well-developed      LABS:                        7.2    7.67  )-----------( 141      ( 14 Dec 2019 07:40 )             24.1     12-13    145  |  119<H>  |  45<H>  ----------------------------<  159<H>  3.9   |  20<L>  |  1.71<H>    Ca    10.0      13 Dec 2019 07:18          CAPILLARY BLOOD GLUCOSE      POCT Blood Glucose.: 137 mg/dL (14 Dec 2019 07:51)  POCT Blood Glucose.: 145 mg/dL (13 Dec 2019 23:23)  POCT Blood Glucose.: 247 mg/dL (13 Dec 2019 18:25)  POCT Blood Glucose.: 269 mg/dL (13 Dec 2019 12:42)    Lipid panel:               RADIOLOGY & ADDITIONAL TESTS:

## 2019-12-14 NOTE — CONSULT NOTE ADULT - SUBJECTIVE AND OBJECTIVE BOX
REASON FOR CONSULTATION:  Anemia  Splenomegaly  Hypercalcemia  INTERVAL HISTORY:      Allergies    No Known Allergies    Intolerances        MEDICATIONS  (STANDING):  atorvastatin 40 milliGRAM(s) Oral at bedtime  azithromycin  IVPB 500 milliGRAM(s) IV Intermittent every 24 hours  azithromycin  IVPB      cefTRIAXone   IVPB 1000 milliGRAM(s) IV Intermittent every 24 hours  dextrose 5%. 1000 milliLiter(s) (50 mL/Hr) IV Continuous <Continuous>  dextrose 50% Injectable 12.5 Gram(s) IV Push once  dextrose 50% Injectable 25 Gram(s) IV Push once  dextrose 50% Injectable 25 Gram(s) IV Push once  heparin  Injectable 5000 Unit(s) SubCutaneous every 12 hours  insulin lispro (HumaLOG) corrective regimen sliding scale   SubCutaneous three times a day before meals  levETIRAcetam 500 milliGRAM(s) Oral two times a day    MEDICATIONS  (PRN):  albuterol/ipratropium for Nebulization. 3 milliLiter(s) Nebulizer every 6 hours PRN Shortness of Breath and/or Wheezing  dextrose 40% Gel 15 Gram(s) Oral once PRN Blood Glucose LESS THAN 70 milliGRAM(s)/deciliter  glucagon  Injectable 1 milliGRAM(s) IntraMuscular once PRN Glucose LESS THAN 70 milligrams/deciliter      Vital Signs Last 24 Hrs  T(C): 36.9 (14 Dec 2019 05:10), Max: 36.9 (14 Dec 2019 05:10)  T(F): 98.5 (14 Dec 2019 05:10), Max: 98.5 (14 Dec 2019 05:10)  HR: 86 (14 Dec 2019 11:12) (70 - 90)  BP: 112/52 (14 Dec 2019 11:12) (102/51 - 134/62)  BP(mean): --  RR: 18 (14 Dec 2019 11:12) (16 - 19)  SpO2: 100% (14 Dec 2019 11:12) (99% - 100%)    PHYSICAL EXAM:  Weak  Anemia  EYES: EOMI, PERRLA, conjunctiva and sclera clear  CHEST/LUNG: Clear to percussion bilaterally;   HEART: Regular rate and rhythm;   ABDOMEN: Soft, Nontender, Nondistended;   LYMPH: No lymphadenopathy noted.        LABS:                        7.2    7.67  )-----------( 141      ( 14 Dec 2019 07:40 )             24.1     12-14    148<H>  |  120<H>  |  43<H>  ----------------------------<  137<H>  3.9   |  22  |  1.51<H>    Ca    9.7      14 Dec 2019 07:40              RADIOLOGY & ADDITIONAL STUDIES:  < from: CT Abdomen and Pelvis No Cont (12.13.19 @ 11:26) >  EXAM:  CT ABDOMEN AND PELVIS                          EXAM:  CT CHEST                            PROCEDURE DATE:  12/13/2019          INTERPRETATION:  CLINICAL INFORMATION: Hypercalcemia of unknown etiology.   Rule out malignancy.    COMPARISON: None.    PROCEDURE:   CT of the Chest, Abdomen and Pelvis was performed without intravenous   contrast.   Intravenous contrast: None.  Oral contrast: None.  Sagittal and coronal reformats were performed.    FINDINGS:  Evaluation is limited without intravenous contrast.  CHEST:     LUNGS AND LARGE AIRWAYS: Patent central airways. Mildly limited   evaluation due to respiratory motion artifact. No definite mass or   suspicious nodule. Mild interlobular septal thickening at the lung apices   may reflectmild edema.  PLEURA: Small left pleural effusion.  VESSELS: Calcific atherosclerosis of the aorta and coronary arteries.   Calcifications of the mitral valve annulus and aortic valve.  HEART: Mild cardiomegaly. No pericardial effusion.  MEDIASTINUM AND JAMISON: No lymphadenopathy.  CHEST WALL AND LOWER NECK: Within normal limits.    ABDOMEN AND PELVIS:    LIVER: Within normal limits.  BILE DUCTS: Normal caliber.  GALLBLADDER: Within normal limits.  SPLEEN: Mildly enlarged measuring up to 13.2 cm.  PANCREAS: Within normal limits.  ADRENALS: Nonspecific bilateral adrenal gland thickening.  KIDNEYS/URETERS: No hydronephrosis or urinary tract calculi.    BLADDER: Decompressed around a Jaimes catheter.  REPRODUCTIVE ORGANS: Uterus and adnexa within normal limits.    BOWEL: No bowel obstruction. Appendix is normal. Colonic diverticulosis.   Lobular contour of the proximal stomach.  PERITONEUM: No ascites. Minimal nonspecific presacral edema.  VESSELS: Calcific atherosclerosis.  RETROPERITONEUM/LYMPH NODES: Nonspecific subcentimeter retroperitoneal   nodes.  ABDOMINAL WALL: Small fat-containing umbilical hernia. Small bilateral   fat-containing inguinal hernias.  BONES: Old left seventh rib fracture. Degenerative changes. Lucent lesion   within the T6 vertebral body with associated age-indeterminate mild   compression deformity.    IMPRESSION:     No definite mass identified within the limitations of this noncontrast   exam.    Lobular contour of the proximal stomach, possibly related to   underdistention. Further evaluation with oral contrast enhanced scan or   upper endoscopy can be obtained.     Indeterminate lucent lesion within the T6 vertebral body with associated   age-indeterminate mild compression deformity. Comparison with prior   imaging is recommended. Further evaluation can be obtained with MRI.    Mild splenomegaly.    Small left pleural effusion. Mild interlobular septal thickening in the   lung apices may reflect mild pulmonary edema.            < end of copied text >    PATHOLOGY:

## 2019-12-14 NOTE — PROGRESS NOTE ADULT - PROBLEM SELECTOR PLAN 1
controlled  while inpt continue ANDREINA TID-AC  upon d/c can not resume metformin as creatinine >1.5  may be able to be d/c on cho controlled diet alone vs DPP4 inhibitor like tradjenta 5mg daily

## 2019-12-15 DIAGNOSIS — I50.33 ACUTE ON CHRONIC DIASTOLIC (CONGESTIVE) HEART FAILURE: ICD-10-CM

## 2019-12-15 LAB
ANION GAP SERPL CALC-SCNC: 8 MMOL/L — SIGNIFICANT CHANGE UP (ref 5–17)
BUN SERPL-MCNC: 33 MG/DL — HIGH (ref 7–23)
CALCIUM SERPL-MCNC: 9.3 MG/DL — SIGNIFICANT CHANGE UP (ref 8.5–10.1)
CHLORIDE SERPL-SCNC: 117 MMOL/L — HIGH (ref 96–108)
CO2 SERPL-SCNC: 22 MMOL/L — SIGNIFICANT CHANGE UP (ref 22–31)
CREAT SERPL-MCNC: 1.42 MG/DL — HIGH (ref 0.5–1.3)
FOLATE SERPL-MCNC: 7.9 NG/ML — SIGNIFICANT CHANGE UP
GLUCOSE BLDC GLUCOMTR-MCNC: 145 MG/DL — HIGH (ref 70–99)
GLUCOSE BLDC GLUCOMTR-MCNC: 172 MG/DL — HIGH (ref 70–99)
GLUCOSE BLDC GLUCOMTR-MCNC: 191 MG/DL — HIGH (ref 70–99)
GLUCOSE BLDC GLUCOMTR-MCNC: 211 MG/DL — HIGH (ref 70–99)
GLUCOSE SERPL-MCNC: 133 MG/DL — HIGH (ref 70–99)
HCT VFR BLD CALC: 24.7 % — LOW (ref 34.5–45)
HGB BLD-MCNC: 7.5 G/DL — LOW (ref 11.5–15.5)
IRON SATN MFR SERPL: 12 % — LOW (ref 14–50)
IRON SATN MFR SERPL: 24 UG/DL — LOW (ref 30–160)
MCHC RBC-ENTMCNC: 25 PG — LOW (ref 27–34)
MCHC RBC-ENTMCNC: 30.4 GM/DL — LOW (ref 32–36)
MCV RBC AUTO: 82.3 FL — SIGNIFICANT CHANGE UP (ref 80–100)
NRBC # BLD: 0 /100 WBCS — SIGNIFICANT CHANGE UP (ref 0–0)
PLATELET # BLD AUTO: 143 K/UL — LOW (ref 150–400)
POTASSIUM SERPL-MCNC: 3.9 MMOL/L — SIGNIFICANT CHANGE UP (ref 3.5–5.3)
POTASSIUM SERPL-SCNC: 3.9 MMOL/L — SIGNIFICANT CHANGE UP (ref 3.5–5.3)
RBC # BLD: 3 M/UL — LOW (ref 3.8–5.2)
RBC # FLD: 16.1 % — HIGH (ref 10.3–14.5)
SODIUM SERPL-SCNC: 147 MMOL/L — HIGH (ref 135–145)
TIBC SERPL-MCNC: 193 UG/DL — LOW (ref 220–430)
UIBC SERPL-MCNC: 169 UG/DL — SIGNIFICANT CHANGE UP (ref 110–370)
VIT B12 SERPL-MCNC: 472 PG/ML — SIGNIFICANT CHANGE UP (ref 232–1245)
WBC # BLD: 7.61 K/UL — SIGNIFICANT CHANGE UP (ref 3.8–10.5)
WBC # FLD AUTO: 7.61 K/UL — SIGNIFICANT CHANGE UP (ref 3.8–10.5)

## 2019-12-15 PROCEDURE — 99233 SBSQ HOSP IP/OBS HIGH 50: CPT

## 2019-12-15 RX ADMIN — Medication 1: at 11:01

## 2019-12-15 RX ADMIN — CEFTRIAXONE 100 MILLIGRAM(S): 500 INJECTION, POWDER, FOR SOLUTION INTRAMUSCULAR; INTRAVENOUS at 01:18

## 2019-12-15 RX ADMIN — HEPARIN SODIUM 5000 UNIT(S): 5000 INJECTION INTRAVENOUS; SUBCUTANEOUS at 05:35

## 2019-12-15 RX ADMIN — LEVETIRACETAM 500 MILLIGRAM(S): 250 TABLET, FILM COATED ORAL at 17:02

## 2019-12-15 RX ADMIN — Medication 2: at 16:01

## 2019-12-15 RX ADMIN — LEVETIRACETAM 500 MILLIGRAM(S): 250 TABLET, FILM COATED ORAL at 05:35

## 2019-12-15 RX ADMIN — HEPARIN SODIUM 5000 UNIT(S): 5000 INJECTION INTRAVENOUS; SUBCUTANEOUS at 17:02

## 2019-12-15 RX ADMIN — AZITHROMYCIN 255 MILLIGRAM(S): 500 TABLET, FILM COATED ORAL at 08:05

## 2019-12-15 RX ADMIN — ATORVASTATIN CALCIUM 40 MILLIGRAM(S): 80 TABLET, FILM COATED ORAL at 22:00

## 2019-12-15 NOTE — PROGRESS NOTE ADULT - PROBLEM SELECTOR PLAN 8
echo as above   assumed chronic with an acute exacerbation during hospital due to ivf   resolved s/p diuretic   consider diuretic prn

## 2019-12-15 NOTE — PROGRESS NOTE ADULT - PROBLEM SELECTOR PLAN 3
urine culture enterococcus faecalis  continue with antibiotic   wbc downtrending   f/u labs today urine culture enterococcus faecalis  continue with antibiotic   wbc now wnl

## 2019-12-15 NOTE — PROGRESS NOTE ADULT - SUBJECTIVE AND OBJECTIVE BOX
INTERVAL History:  Weakness  UTI    Allergies    No Known Allergies    Intolerances        MEDICATIONS  (STANDING):  atorvastatin 40 milliGRAM(s) Oral at bedtime  azithromycin  IVPB 500 milliGRAM(s) IV Intermittent every 24 hours  azithromycin  IVPB      cefTRIAXone   IVPB 1000 milliGRAM(s) IV Intermittent every 24 hours  dextrose 5%. 1000 milliLiter(s) (50 mL/Hr) IV Continuous <Continuous>  dextrose 50% Injectable 12.5 Gram(s) IV Push once  dextrose 50% Injectable 25 Gram(s) IV Push once  dextrose 50% Injectable 25 Gram(s) IV Push once  heparin  Injectable 5000 Unit(s) SubCutaneous every 12 hours  insulin lispro (HumaLOG) corrective regimen sliding scale   SubCutaneous three times a day before meals  levETIRAcetam 500 milliGRAM(s) Oral two times a day    MEDICATIONS  (PRN):  albuterol/ipratropium for Nebulization. 3 milliLiter(s) Nebulizer every 6 hours PRN Shortness of Breath and/or Wheezing  dextrose 40% Gel 15 Gram(s) Oral once PRN Blood Glucose LESS THAN 70 milliGRAM(s)/deciliter  glucagon  Injectable 1 milliGRAM(s) IntraMuscular once PRN Glucose LESS THAN 70 milligrams/deciliter      Vital Signs Last 24 Hrs  T(C): 37 (15 Dec 2019 05:05), Max: 37.4 (14 Dec 2019 23:20)  T(F): 98.6 (15 Dec 2019 05:05), Max: 99.4 (14 Dec 2019 23:20)  HR: 90 (15 Dec 2019 05:05) (79 - 90)  BP: 128/69 (15 Dec 2019 05:05) (112/52 - 128/69)  BP(mean): --  RR: 18 (15 Dec 2019 05:05) (18 - 18)  SpO2: 100% (15 Dec 2019 05:05) (100% - 100%)    PHYSICAL EXAM:    Pallor  EYES: EOMI, PERRLA, conjunctiva and sclera clear  NECK: Supple, No JVD, Normal thyroid  CHEST/LUNG: Clear to percussion bilaterally; No rales, rhonchi,   HEART: Regular rate and rhythm;   ABDOMEN: Soft, Nontender.  LYMPH: No lymphadenopathy noted        LABS:                        7.2    7.67  )-----------( 141      ( 14 Dec 2019 07:40 )             24.1     12-14    148<H>  |  120<H>  |  43<H>  ----------------------------<  137<H>  3.9   |  22  |  1.51<H>    Ca    9.7      14 Dec 2019 07:40              RADIOLOGY & ADDITIONAL STUDIES:    PATHOLOGY:

## 2019-12-15 NOTE — PROGRESS NOTE ADULT - PROBLEM SELECTOR PLAN 7
improving s/p de jesus on 12/11/19   renal images unremarkable    urine cx enterococcus faecalis   appreciate urology note  12/14/19 d/w urology keep de jesus for now

## 2019-12-15 NOTE — PROGRESS NOTE ADULT - SUBJECTIVE AND OBJECTIVE BOX
Patient is a 89y old  Female who presents with a chief complaint of Weakness. (10 Dec 2019 06:42)      OVERNIGHT EVENTS: none    MEDICATIONS  (STANDING):  atorvastatin 40 milliGRAM(s) Oral at bedtime  azithromycin  IVPB 500 milliGRAM(s) IV Intermittent every 24 hours  azithromycin  IVPB      cefTRIAXone   IVPB 1000 milliGRAM(s) IV Intermittent every 24 hours  dextrose 5%. 1000 milliLiter(s) (50 mL/Hr) IV Continuous <Continuous>  dextrose 50% Injectable 12.5 Gram(s) IV Push once  dextrose 50% Injectable 25 Gram(s) IV Push once  dextrose 50% Injectable 25 Gram(s) IV Push once  heparin  Injectable 5000 Unit(s) SubCutaneous every 12 hours  insulin lispro (HumaLOG) corrective regimen sliding scale   SubCutaneous three times a day before meals  levETIRAcetam 500 milliGRAM(s) Oral two times a day    MEDICATIONS  (PRN):  albuterol/ipratropium for Nebulization. 3 milliLiter(s) Nebulizer every 6 hours PRN Shortness of Breath and/or Wheezing  dextrose 40% Gel 15 Gram(s) Oral once PRN Blood Glucose LESS THAN 70 milliGRAM(s)/deciliter  glucagon  Injectable 1 milliGRAM(s) IntraMuscular once PRN Glucose LESS THAN 70 milligrams/deciliter    Allergies    No Known Allergies    Intolerances        SUBJECTIVE: in bed in NAD, no acute events overnight     Vital Signs Last 24 Hrs  T(C): 37 (15 Dec 2019 05:05), Max: 37.4 (14 Dec 2019 23:20)  T(F): 98.6 (15 Dec 2019 05:05), Max: 99.4 (14 Dec 2019 23:20)  HR: 90 (15 Dec 2019 05:05) (79 - 90)  BP: 128/69 (15 Dec 2019 05:05) (126/62 - 128/69)  BP(mean): --  RR: 18 (15 Dec 2019 05:05) (18 - 18)  SpO2: 100% (15 Dec 2019 05:05) (100% - 100%)    PHYSICAL EXAM:    GENERAL: NAD, well-groomed, well-developed  HEAD:  Atraumatic, Normocephalic  EYES: EOMI, PERRLA, conjunctiva and sclera clear  ENMT: No tonsillar erythema, exudates, or enlargement; Moist mucous membranes, Good dentition, No lesions  NECK: Supple, No JVD, Normal thyroid  CHEST/LUNG:  improved air entry no wheezing or rhonchi    HEART: Regular rate and rhythm; No murmurs, rubs, or gallops  ABDOMEN: Soft, Nontender, Nondistended; Bowel sounds present  EXTREMITIES:  2+ Peripheral Pulses, No clubbing, cyanosis, or edema BL LE    SKIN: No rashes or lesions  NERVOUS SYSTEM:  Alert & Oriented X1 demented follows commands ;   DTRs 2+ intact and symmetric, sensation intact BL    LABS:                      Vitamin D, 1, 25-Dihydroxy (12.10.19 @ 14:48)    Vitamin D, 1, 25-Dihydroxy: 69.9 pg/mL    Parathyroid Hormone Intact, Serum (12.10.19 @ 12:39)    Intact PTH: 4: PTH METHOD: Roche  Guide for Interpretation of PTH and Calcium Results                           Calcium             PTH                           MG/DL               PG/ML  Normal                   8.4-10.5            15-65  Primary  Hyperparathyroidism      >10.5               >50  Non-PTH Hypercalcemia    >10.5               0-20  Hypoparathyroidism       <8.4                0-20  Pseudohypoparathyroid    <8.4                >50  This is intended as a guide only. Factors such as sunlight exposure,  Vitamin D status and renal function should be evaluated along with  clinical presentation. pg/mL      Cultures;   Urinalysis Basic - ( 11 Dec 2019 13:27 )    Color: Yellow / Appearance: very cloudy / S.015 / pH: x  Gluc: x / Ketone: Negative  / Bili: Negative / Urobili: Negative mg/dL   Blood: x / Protein: 100 mg/dL / Nitrite: Negative   Leuk Esterase: Moderate / RBC: 11-25 /HPF / WBC TNTC /HPF   Sq Epi: x / Non Sq Epi: Few / Bacteria: Few      Culture - Urine (collected 10 Dec 2019 09:28)  Source: .Urine Clean Catch (Midstream)  Final Report (11 Dec 2019 10:01):    >=3 organisms. Probable collection contamination.            Calcium, Ionized in AM (19 @ 09:18)    Calcium, Ionized: 1.59 mmoL/L        CAPILLARY BLOOD GLUCOSE      POCT Blood Glucose.: 191 mg/dL (15 Dec 2019 10:36)  POCT Blood Glucose.: 145 mg/dL (15 Dec 2019 07:21)  POCT Blood Glucose.: 173 mg/dL (14 Dec 2019 21:01)  POCT Blood Glucose.: 178 mg/dL (14 Dec 2019 16:21)    Lipid panel:     CARDIAC MARKERS ( 09 Dec 2019 21:02 )  <.015 ng/mL / x     / x     / x     / x            RADIOLOGY & ADDITIONAL TESTS:    < from: TTE Echo Doppler w/o Cont (19 @ 16:06) >  Summary:   1. Left ventricular ejection fraction, by visual estimation, is 45 to   50%.   2. Normal left ventricular internal cavity size.   3. Spectral Doppler shows impaired relaxation pattern of left   ventricular myocardial filling (Grade I diastolic dysfunction).   4. Normal right ventricular size and function.   5. The left atrium is normal in size.   6. The right atrium is normal in size.   7. There is no evidence of pericardial effusion.   8. Mild mitral annular calcification.   9. Mild to moderate mitral valve regurgitation.  10. Thickening and calcification of the posterior mitral valve leaflet.  11. Moderate tricuspid regurgitation.  12. Structurally normal tricuspid valve, with normal leaflet excursion.  13. Normal trileaflet aortic valve with normal opening.  14. Mild to moderate pulmonic valve regurgitation.  15. Structurally normal pulmonic valve, with normal leaflet excursion.  16. Estimated pulmonary artery systolic pressure is 55.1 mmHg assuming a   right atrial pressure of 10 mmHg, which is consistent with moderate   pulmonary hypertension.  17. Mitral valve mean gradient is 2.7 mmHg consistent with mild mitral   stenosis.      < end of copied text >    Imaging Personally Reviewed:  [ x] YES      Consultant(s) Notes Reviewed:  [x ] YES     Care Discussed with [x ] Consultants [X ] Patient [x ] Family  [x ]    [x ]  Other; RN Patient is a 89y old  Female who presents with a chief complaint of Weakness. (10 Dec 2019 06:42)      OVERNIGHT EVENTS: none    MEDICATIONS  (STANDING):  atorvastatin 40 milliGRAM(s) Oral at bedtime  azithromycin  IVPB 500 milliGRAM(s) IV Intermittent every 24 hours  azithromycin  IVPB      cefTRIAXone   IVPB 1000 milliGRAM(s) IV Intermittent every 24 hours  dextrose 5%. 1000 milliLiter(s) (50 mL/Hr) IV Continuous <Continuous>  dextrose 50% Injectable 12.5 Gram(s) IV Push once  dextrose 50% Injectable 25 Gram(s) IV Push once  dextrose 50% Injectable 25 Gram(s) IV Push once  heparin  Injectable 5000 Unit(s) SubCutaneous every 12 hours  insulin lispro (HumaLOG) corrective regimen sliding scale   SubCutaneous three times a day before meals  levETIRAcetam 500 milliGRAM(s) Oral two times a day    MEDICATIONS  (PRN):  albuterol/ipratropium for Nebulization. 3 milliLiter(s) Nebulizer every 6 hours PRN Shortness of Breath and/or Wheezing  dextrose 40% Gel 15 Gram(s) Oral once PRN Blood Glucose LESS THAN 70 milliGRAM(s)/deciliter  glucagon  Injectable 1 milliGRAM(s) IntraMuscular once PRN Glucose LESS THAN 70 milligrams/deciliter    Allergies    No Known Allergies    Intolerances        SUBJECTIVE: in bed in NAD, no acute events overnight     Vital Signs Last 24 Hrs  T(C): 37 (15 Dec 2019 05:05), Max: 37.4 (14 Dec 2019 23:20)  T(F): 98.6 (15 Dec 2019 05:05), Max: 99.4 (14 Dec 2019 23:20)  HR: 90 (15 Dec 2019 05:05) (79 - 90)  BP: 128/69 (15 Dec 2019 05:05) (126/62 - 128/69)  BP(mean): --  RR: 18 (15 Dec 2019 05:05) (18 - 18)  SpO2: 100% (15 Dec 2019 05:05) (100% - 100%)    PHYSICAL EXAM:    GENERAL: NAD, well-groomed, well-developed  HEAD:  Atraumatic, Normocephalic  EYES: EOMI, PERRLA, conjunctiva and sclera clear  ENMT: No tonsillar erythema, exudates, or enlargement; Moist mucous membranes, Good dentition, No lesions  NECK: Supple, No JVD, Normal thyroid  CHEST/LUNG:  improved air entry no wheezing or rhonchi    HEART: Regular rate and rhythm; No murmurs, rubs, or gallops  ABDOMEN: Soft, Nontender, Nondistended; Bowel sounds present  EXTREMITIES:  2+ Peripheral Pulses, No clubbing, cyanosis, or edema BL LE    SKIN: No rashes or lesions  NERVOUS SYSTEM:  Alert & Oriented X1 demented follows commands ;   DTRs 2+ intact and symmetric, sensation intact BL    LABS:                                      7.5    7.61  )-----------( 143      ( 15 Dec 2019 12:32 )             24.7   12-15    147<H>  |  117<H>  |  33<H>  ----------------------------<  133<H>  3.9   |  22  |  1.42<H>    Ca    9.3      15 Dec 2019 12:32          Vitamin D, 1, 25-Dihydroxy (12.10.19 @ 14:48)    Vitamin D, 1, 25-Dihydroxy: 69.9 pg/mL    Parathyroid Hormone Intact, Serum (12.10.19 @ 12:39)    Intact PTH: 4: PTH METHOD: Roche  Guide for Interpretation of PTH and Calcium Results                           Calcium             PTH                           MG/DL               PG/ML  Normal                   8.4-10.5            15-65  Primary  Hyperparathyroidism      >10.5               >50  Non-PTH Hypercalcemia    >10.5               0-20  Hypoparathyroidism       <8.4                0-20  Pseudohypoparathyroid    <8.4                >50  This is intended as a guide only. Factors such as sunlight exposure,  Vitamin D status and renal function should be evaluated along with  clinical presentation. pg/mL      Cultures;   Urinalysis Basic - ( 11 Dec 2019 13:27 )    Color: Yellow / Appearance: very cloudy / S.015 / pH: x  Gluc: x / Ketone: Negative  / Bili: Negative / Urobili: Negative mg/dL   Blood: x / Protein: 100 mg/dL / Nitrite: Negative   Leuk Esterase: Moderate / RBC: 11-25 /HPF / WBC TNTC /HPF   Sq Epi: x / Non Sq Epi: Few / Bacteria: Few      Culture - Urine (collected 10 Dec 2019 09:28)  Source: .Urine Clean Catch (Midstream)  Final Report (11 Dec 2019 10:01):    >=3 organisms. Probable collection contamination.            Calcium, Ionized in AM (19 @ 09:18)    Calcium, Ionized: 1.59 mmoL/L        CAPILLARY BLOOD GLUCOSE      POCT Blood Glucose.: 191 mg/dL (15 Dec 2019 10:36)  POCT Blood Glucose.: 145 mg/dL (15 Dec 2019 07:21)  POCT Blood Glucose.: 173 mg/dL (14 Dec 2019 21:01)  POCT Blood Glucose.: 178 mg/dL (14 Dec 2019 16:21)    Lipid panel:     CARDIAC MARKERS ( 09 Dec 2019 21:02 )  <.015 ng/mL / x     / x     / x     / x            RADIOLOGY & ADDITIONAL TESTS:    < from: TTE Echo Doppler w/o Cont (19 @ 16:06) >  Summary:   1. Left ventricular ejection fraction, by visual estimation, is 45 to   50%.   2. Normal left ventricular internal cavity size.   3. Spectral Doppler shows impaired relaxation pattern of left   ventricular myocardial filling (Grade I diastolic dysfunction).   4. Normal right ventricular size and function.   5. The left atrium is normal in size.   6. The right atrium is normal in size.   7. There is no evidence of pericardial effusion.   8. Mild mitral annular calcification.   9. Mild to moderate mitral valve regurgitation.  10. Thickening and calcification of the posterior mitral valve leaflet.  11. Moderate tricuspid regurgitation.  12. Structurally normal tricuspid valve, with normal leaflet excursion.  13. Normal trileaflet aortic valve with normal opening.  14. Mild to moderate pulmonic valve regurgitation.  15. Structurally normal pulmonic valve, with normal leaflet excursion.  16. Estimated pulmonary artery systolic pressure is 55.1 mmHg assuming a   right atrial pressure of 10 mmHg, which is consistent with moderate   pulmonary hypertension.  17. Mitral valve mean gradient is 2.7 mmHg consistent with mild mitral   stenosis.      < end of copied text >    Imaging Personally Reviewed:  [ x] YES      Consultant(s) Notes Reviewed:  [x ] YES     Care Discussed with [x ] Consultants [X ] Patient [x ] Family  [x ]    [x ]  Other; RN

## 2019-12-15 NOTE — PROGRESS NOTE ADULT - PROBLEM SELECTOR PLAN 6
no old labs to compare    presumed due to acute urinary retention see below .  s/p de jesus on 12/11/19 and improving appreciate renal note    eosinophils positive no old labs to compare    presumed due to acute urinary retention see below .  s/p de jesus on 12/11/19 and improving  daily , appreciate renal note    eosinophils positive

## 2019-12-15 NOTE — PROGRESS NOTE ADULT - ATTENDING COMMENTS
d/w daughters at bedside [facility]  number is [phone]  12/14/19 they are not on same page regarding dnr status. they will review patient  living will and discuss more in detail

## 2019-12-15 NOTE — PROGRESS NOTE ADULT - SUBJECTIVE AND OBJECTIVE BOX
Patient is a 89y old  Female who presents with a chief complaint of Weakness. (14 Dec 2019 17:43)      INTERVAL HPI/OVERNIGHT EVENTS:  pt with no complaints  eating well, good appetite    MEDICATIONS  (STANDING):  atorvastatin 40 milliGRAM(s) Oral at bedtime  azithromycin  IVPB 500 milliGRAM(s) IV Intermittent every 24 hours  azithromycin  IVPB      cefTRIAXone   IVPB 1000 milliGRAM(s) IV Intermittent every 24 hours  dextrose 5%. 1000 milliLiter(s) (50 mL/Hr) IV Continuous <Continuous>  dextrose 50% Injectable 12.5 Gram(s) IV Push once  dextrose 50% Injectable 25 Gram(s) IV Push once  dextrose 50% Injectable 25 Gram(s) IV Push once  heparin  Injectable 5000 Unit(s) SubCutaneous every 12 hours  insulin lispro (HumaLOG) corrective regimen sliding scale   SubCutaneous three times a day before meals  levETIRAcetam 500 milliGRAM(s) Oral two times a day    MEDICATIONS  (PRN):  albuterol/ipratropium for Nebulization. 3 milliLiter(s) Nebulizer every 6 hours PRN Shortness of Breath and/or Wheezing  dextrose 40% Gel 15 Gram(s) Oral once PRN Blood Glucose LESS THAN 70 milliGRAM(s)/deciliter  glucagon  Injectable 1 milliGRAM(s) IntraMuscular once PRN Glucose LESS THAN 70 milligrams/deciliter      REVIEW OF SYSTEMS:  CONSTITUTIONAL: No fever, weight loss, or fatigue  RESPIRATORY: No cough, wheezing, chills or hemoptysis; No shortness of breath  CARDIOVASCULAR: No chest pain, palpitations, dizziness, or leg swelling  GASTROINTESTINAL: No abdominal or epigastric pain. No nausea, vomiting, or hematemesis; No diarrhea or constipation. No melena or hematochezia.  ENDOCRINE: No heat or cold intolerance; No hair loss      Vital Signs Last 24 Hrs  T(C): 37 (15 Dec 2019 05:05), Max: 37.4 (14 Dec 2019 23:20)  T(F): 98.6 (15 Dec 2019 05:05), Max: 99.4 (14 Dec 2019 23:20)  HR: 90 (15 Dec 2019 05:05) (79 - 90)  BP: 128/69 (15 Dec 2019 05:05) (112/52 - 134/62)  BP(mean): --  RR: 18 (15 Dec 2019 05:05) (16 - 18)  SpO2: 100% (15 Dec 2019 05:05) (99% - 100%)    PHYSICAL EXAM:  GENERAL: NAD, well-groomed, well-developed        LABS:                        7.2    7.67  )-----------( 141      ( 14 Dec 2019 07:40 )             24.1     12-14    148<H>  |  120<H>  |  43<H>  ----------------------------<  137<H>  3.9   |  22  |  1.51<H>    Ca    9.7      14 Dec 2019 07:40          CAPILLARY BLOOD GLUCOSE      POCT Blood Glucose.: 145 mg/dL (15 Dec 2019 07:21)  POCT Blood Glucose.: 173 mg/dL (14 Dec 2019 21:01)  POCT Blood Glucose.: 178 mg/dL (14 Dec 2019 16:21)  POCT Blood Glucose.: 222 mg/dL (14 Dec 2019 11:27)    Lipid panel:               RADIOLOGY & ADDITIONAL TESTS:

## 2019-12-16 ENCOUNTER — RESULT REVIEW (OUTPATIENT)
Age: 84
End: 2019-12-16

## 2019-12-16 LAB
% ALBUMIN: 48.2 % — SIGNIFICANT CHANGE UP
% ALPHA 1: 9 % — SIGNIFICANT CHANGE UP
% ALPHA 2: 23.5 % — SIGNIFICANT CHANGE UP
% BETA: 11.1 % — SIGNIFICANT CHANGE UP
% GAMMA: 8.2 % — SIGNIFICANT CHANGE UP
ALBUMIN SERPL ELPH-MCNC: 2.7 G/DL — LOW (ref 3.6–5.5)
ALBUMIN/GLOB SERPL ELPH: 1 RATIO — SIGNIFICANT CHANGE UP
ALPHA1 GLOB SERPL ELPH-MCNC: 0.5 G/DL — HIGH (ref 0.1–0.4)
ALPHA2 GLOB SERPL ELPH-MCNC: 1.3 G/DL — HIGH (ref 0.5–1)
ANION GAP SERPL CALC-SCNC: 7 MMOL/L — SIGNIFICANT CHANGE UP (ref 5–17)
B-GLOBULIN SERPL ELPH-MCNC: 0.6 G/DL — SIGNIFICANT CHANGE UP (ref 0.5–1)
BUN SERPL-MCNC: 30 MG/DL — HIGH (ref 7–23)
CALCIUM SERPL-MCNC: 8.9 MG/DL — SIGNIFICANT CHANGE UP (ref 8.5–10.1)
CHLORIDE SERPL-SCNC: 116 MMOL/L — HIGH (ref 96–108)
CO2 SERPL-SCNC: 22 MMOL/L — SIGNIFICANT CHANGE UP (ref 22–31)
CREAT SERPL-MCNC: 1.32 MG/DL — HIGH (ref 0.5–1.3)
CULTURE RESULTS: SIGNIFICANT CHANGE UP
GAMMA GLOBULIN: 0.5 G/DL — LOW (ref 0.6–1.6)
GLUCOSE BLDC GLUCOMTR-MCNC: 123 MG/DL — HIGH (ref 70–99)
GLUCOSE BLDC GLUCOMTR-MCNC: 157 MG/DL — HIGH (ref 70–99)
GLUCOSE BLDC GLUCOMTR-MCNC: 176 MG/DL — HIGH (ref 70–99)
GLUCOSE SERPL-MCNC: 143 MG/DL — HIGH (ref 70–99)
HCT VFR BLD CALC: 24.3 % — LOW (ref 34.5–45)
HGB BLD-MCNC: 7.3 G/DL — LOW (ref 11.5–15.5)
INTERPRETATION SERPL IFE-IMP: SIGNIFICANT CHANGE UP
LACTATE SERPL-SCNC: 2.1 MMOL/L — HIGH (ref 0.7–2)
MAGNESIUM SERPL-MCNC: 1.8 MG/DL — SIGNIFICANT CHANGE UP (ref 1.6–2.6)
MCHC RBC-ENTMCNC: 24.2 PG — LOW (ref 27–34)
MCHC RBC-ENTMCNC: 30 GM/DL — LOW (ref 32–36)
MCV RBC AUTO: 80.5 FL — SIGNIFICANT CHANGE UP (ref 80–100)
NRBC # BLD: 0 /100 WBCS — SIGNIFICANT CHANGE UP (ref 0–0)
PHOSPHATE SERPL-MCNC: 3.3 MG/DL — SIGNIFICANT CHANGE UP (ref 2.5–4.5)
PLATELET # BLD AUTO: 133 K/UL — LOW (ref 150–400)
POTASSIUM SERPL-MCNC: 4 MMOL/L — SIGNIFICANT CHANGE UP (ref 3.5–5.3)
POTASSIUM SERPL-SCNC: 4 MMOL/L — SIGNIFICANT CHANGE UP (ref 3.5–5.3)
PROT PATTERN SERPL ELPH-IMP: SIGNIFICANT CHANGE UP
RBC # BLD: 3.02 M/UL — LOW (ref 3.8–5.2)
RBC # FLD: 16.2 % — HIGH (ref 10.3–14.5)
SODIUM SERPL-SCNC: 145 MMOL/L — SIGNIFICANT CHANGE UP (ref 135–145)
SPECIMEN SOURCE: SIGNIFICANT CHANGE UP
WBC # BLD: 6.89 K/UL — SIGNIFICANT CHANGE UP (ref 3.8–10.5)
WBC # FLD AUTO: 6.89 K/UL — SIGNIFICANT CHANGE UP (ref 3.8–10.5)

## 2019-12-16 PROCEDURE — 99233 SBSQ HOSP IP/OBS HIGH 50: CPT

## 2019-12-16 PROCEDURE — 71045 X-RAY EXAM CHEST 1 VIEW: CPT | Mod: 26

## 2019-12-16 RX ORDER — ACETAMINOPHEN 500 MG
650 TABLET ORAL ONCE
Refills: 0 | Status: COMPLETED | OUTPATIENT
Start: 2019-12-16 | End: 2019-12-16

## 2019-12-16 RX ORDER — IPRATROPIUM/ALBUTEROL SULFATE 18-103MCG
3 AEROSOL WITH ADAPTER (GRAM) INHALATION ONCE
Refills: 0 | Status: COMPLETED | OUTPATIENT
Start: 2019-12-16 | End: 2019-12-16

## 2019-12-16 RX ORDER — ACETAMINOPHEN 500 MG
650 TABLET ORAL EVERY 6 HOURS
Refills: 0 | Status: DISCONTINUED | OUTPATIENT
Start: 2019-12-16 | End: 2019-12-24

## 2019-12-16 RX ORDER — PIPERACILLIN AND TAZOBACTAM 4; .5 G/20ML; G/20ML
3.38 INJECTION, POWDER, LYOPHILIZED, FOR SOLUTION INTRAVENOUS ONCE
Refills: 0 | Status: COMPLETED | OUTPATIENT
Start: 2019-12-16 | End: 2019-12-16

## 2019-12-16 RX ORDER — LEVETIRACETAM 250 MG/1
500 TABLET, FILM COATED ORAL EVERY 12 HOURS
Refills: 0 | Status: DISCONTINUED | OUTPATIENT
Start: 2019-12-16 | End: 2019-12-21

## 2019-12-16 RX ORDER — PIPERACILLIN AND TAZOBACTAM 4; .5 G/20ML; G/20ML
3.38 INJECTION, POWDER, LYOPHILIZED, FOR SOLUTION INTRAVENOUS EVERY 8 HOURS
Refills: 0 | Status: DISCONTINUED | OUTPATIENT
Start: 2019-12-16 | End: 2019-12-21

## 2019-12-16 RX ORDER — FUROSEMIDE 40 MG
20 TABLET ORAL ONCE
Refills: 0 | Status: COMPLETED | OUTPATIENT
Start: 2019-12-16 | End: 2019-12-16

## 2019-12-16 RX ADMIN — CEFTRIAXONE 100 MILLIGRAM(S): 500 INJECTION, POWDER, FOR SOLUTION INTRAMUSCULAR; INTRAVENOUS at 00:55

## 2019-12-16 RX ADMIN — LEVETIRACETAM 500 MILLIGRAM(S): 250 TABLET, FILM COATED ORAL at 05:09

## 2019-12-16 RX ADMIN — HEPARIN SODIUM 5000 UNIT(S): 5000 INJECTION INTRAVENOUS; SUBCUTANEOUS at 05:09

## 2019-12-16 RX ADMIN — Medication 650 MILLIGRAM(S): at 23:46

## 2019-12-16 RX ADMIN — Medication 3 MILLILITER(S): at 20:20

## 2019-12-16 RX ADMIN — AZITHROMYCIN 255 MILLIGRAM(S): 500 TABLET, FILM COATED ORAL at 09:55

## 2019-12-16 RX ADMIN — Medication 1: at 12:04

## 2019-12-16 RX ADMIN — Medication 650 MILLIGRAM(S): at 21:31

## 2019-12-16 RX ADMIN — PIPERACILLIN AND TAZOBACTAM 200 GRAM(S): 4; .5 INJECTION, POWDER, LYOPHILIZED, FOR SOLUTION INTRAVENOUS at 23:21

## 2019-12-16 RX ADMIN — Medication 650 MILLIGRAM(S): at 02:04

## 2019-12-16 RX ADMIN — Medication 20 MILLIGRAM(S): at 21:04

## 2019-12-16 RX ADMIN — Medication 650 MILLIGRAM(S): at 03:53

## 2019-12-16 RX ADMIN — LEVETIRACETAM 420 MILLIGRAM(S): 250 TABLET, FILM COATED ORAL at 21:23

## 2019-12-16 RX ADMIN — ATORVASTATIN CALCIUM 40 MILLIGRAM(S): 80 TABLET, FILM COATED ORAL at 21:26

## 2019-12-16 NOTE — PROGRESS NOTE ADULT - PROBLEM SELECTOR PLAN 1
Continue with the same regimen while inpatient   stable finger sticks   while inpatient Finger Sticks should be in 140-180 range, preferably <160   Encourage more nutrition

## 2019-12-16 NOTE — PROGRESS NOTE ADULT - SUBJECTIVE AND OBJECTIVE BOX
Patient is a 89y old  Female who presents with a chief complaint of Weakness. (16 Dec 2019 17:11)      Interval History: finger sticks are stable   serum Calcium 8.9    MEDICATIONS  (STANDING):  atorvastatin 40 milliGRAM(s) Oral at bedtime  dextrose 5%. 1000 milliLiter(s) (50 mL/Hr) IV Continuous <Continuous>  dextrose 50% Injectable 12.5 Gram(s) IV Push once  dextrose 50% Injectable 25 Gram(s) IV Push once  dextrose 50% Injectable 25 Gram(s) IV Push once  heparin  Injectable 5000 Unit(s) SubCutaneous every 12 hours  insulin lispro (HumaLOG) corrective regimen sliding scale   SubCutaneous three times a day before meals  levETIRAcetam  IVPB 500 milliGRAM(s) IV Intermittent every 12 hours  piperacillin/tazobactam IVPB.. 3.375 Gram(s) IV Intermittent every 8 hours    MEDICATIONS  (PRN):  acetaminophen  Suppository .. 650 milliGRAM(s) Rectal every 6 hours PRN Temp greater or equal to 38C (100.4F), Mild Pain (1 - 3)  albuterol/ipratropium for Nebulization. 3 milliLiter(s) Nebulizer every 6 hours PRN Shortness of Breath and/or Wheezing  dextrose 40% Gel 15 Gram(s) Oral once PRN Blood Glucose LESS THAN 70 milliGRAM(s)/deciliter  glucagon  Injectable 1 milliGRAM(s) IntraMuscular once PRN Glucose LESS THAN 70 milligrams/deciliter      Allergies    No Known Allergies    Intolerances        REVIEW OF SYSTEMS:  CONSTITUTIONAL: no changes  EYES: No eye pain, visual disturbances, or discharge  ENMT:  No difficulty hearing, No sinus or throat pain  NECK: No pain or stiffness  RESPIRATORY: No cough, wheezing, chills or hemoptysis; No shortness of breath  CARDIOVASCULAR: No chest pain, palpitations or leg swelling  GASTROINTESTINAL: No abdominal or epigastric pain. No nausea, vomiting, or hematemesis; No diarrhea or constipation. No melena or hematochezia.  GENITOURINARY: No dysuria, frequency, hematuria, or incontinence  NEUROLOGICAL: No headaches, memory loss, loss of strength, numbness, or tremors  SKIN: No itching, burning, rashes, or lesions   ENDOCRINE: No heat or cold intolerance; No hair loss  MUSCULOSKELETAL: No joint pain or swelling; No muscle, back, or extremity pain  PSYCHIATRIC: No depression, anxiety, mood swings, or difficulty sleeping  HEME/LYMPH: No easy bruising, or bleeding gums  ALLERY AND IMMUNOLOGIC: No hives or eczema    Vital Signs Last 24 Hrs  T(C): 37.8 (16 Dec 2019 20:41), Max: 38.6 (16 Dec 2019 19:41)  T(F): 100 (16 Dec 2019 20:41), Max: 101.4 (16 Dec 2019 19:41)  HR: 136 (16 Dec 2019 20:41) (84 - 136)  BP: 146/85 (16 Dec 2019 20:41) (114/68 - 154/92)  BP(mean): --  RR: 19 (16 Dec 2019 20:41) (17 - 28)  SpO2: 100% (16 Dec 2019 20:41) (96% - 100%)    PHYSICAL EXAM:  GENERAL:   HEAD: Atraumatic, Normocephalic  EYES: PERRLA, conjunctiva and sclera clear  ENMT: No tonsillar erythema, exudates, or enlargement; Moist mucous membranes, Good dentition, No lesions  NECK: Supple, No JVD, Normal thyroid  NERVOUS SYSTEM:  Alert & Oriented X3, Good concentration; Motor Strength 5/5 B/L upper and lower extremities  CHEST/LUNG: Clear to auscultaion bilaterally; No rales, rhonchi, wheezing, or rubs  HEART: Regular rate and rhythm; No murmurs, rubs, or gallops  ABDOMEN: Soft, Nontender, Nondistended; Bowel sounds present  EXTREMITIES:  2+ Peripheral Pulses, no edema  SKIN: No rashes or lesions    LABS:        CAPILLARY BLOOD GLUCOSE      POCT Blood Glucose.: 157 mg/dL (16 Dec 2019 12:00)  POCT Blood Glucose.: 123 mg/dL (16 Dec 2019 08:21)  POCT Blood Glucose.: 172 mg/dL (15 Dec 2019 22:01)    Lipid panel:           Thyroid:  Diabetes Tests:  Parathyroid Panel:  Adrenals:  RADIOLOGY & ADDITIONAL TESTS:    Imaging Personally Reviewed:  [ ] YES  [ ] NO    Consultant(s) Notes Reviewed:  [ ] YES  [ ] NO    Care Discussed with Consultants/Other Providers [ ] YES  [ ] NO

## 2019-12-16 NOTE — PROGRESS NOTE ADULT - SUBJECTIVE AND OBJECTIVE BOX
[facility] NEPHROLOGY SERVICES, [facility]  NEPHROLOGY AND HYPERTENSION  [address]  [address]  [place] [address]  [phone]    MD MOE JAMES, MD DENZEL LICEA, MD BAIRON FUNES, MD DARON FINLEY, MD TIMO RICHARD MD          Patient feels well no complaints today.    MEDICATIONS  (STANDING):  atorvastatin 40 milliGRAM(s) Oral at bedtime  dextrose 5%. 1000 milliLiter(s) (50 mL/Hr) IV Continuous <Continuous>  dextrose 50% Injectable 12.5 Gram(s) IV Push once  dextrose 50% Injectable 25 Gram(s) IV Push once  dextrose 50% Injectable 25 Gram(s) IV Push once  heparin  Injectable 5000 Unit(s) SubCutaneous every 12 hours  insulin lispro (HumaLOG) corrective regimen sliding scale   SubCutaneous three times a day before meals  levETIRAcetam 500 milliGRAM(s) Oral two times a day    MEDICATIONS  (PRN):  albuterol/ipratropium for Nebulization. 3 milliLiter(s) Nebulizer every 6 hours PRN Shortness of Breath and/or Wheezing  dextrose 40% Gel 15 Gram(s) Oral once PRN Blood Glucose LESS THAN 70 milliGRAM(s)/deciliter  glucagon  Injectable 1 milliGRAM(s) IntraMuscular once PRN Glucose LESS THAN 70 milligrams/deciliter      12-15-19 @ 07:01  -  19 @ 07:00  --------------------------------------------------------  IN: 0 mL / OUT: 275 mL / NET: -275 mL    19 @ 07:01  -  19 @ 17:12  --------------------------------------------------------  IN: 0 mL / OUT: 200 mL / NET: -200 mL      PHYSICAL EXAM:      T(C): 37.3 (19 @ 16:39), Max: 38 (19 @ 00:31)  HR: 100 (19 @ 16:54) (84 - 107)  BP: 117/71 (19 @ 16:54) (114/68 - 149/81)  RR: 20 (19 @ 16:54) (17 - 28)  SpO2: 100% (19 @ 16:54) (98% - 100%)  Wt(kg): --  Respiratory: clear anteriorly, decreased BS at bases  Cardiovascular: S1 S2  Gastrointestinal: soft NT ND +BS  Extremities:  1 edema                                    7.3    6.89  )-----------( 133      ( 16 Dec 2019 06:14 )             24.3         145  |  116<H>  |  30<H>  ----------------------------<  143<H>  4.0   |  22  |  1.32<H>    Ca    8.9      16 Dec 2019 06:14  Phos  3.3     -  Mg     1.8         Bence Angela/Protein, Urine (19 @ 13:04)    Bence Angela/Protein, Urine: Absent    Immunoelectrophoresis, Serum (19 @ 11:07)    Protein Total, Serum: 5.5 g/dL    Total Protein, Serum: 5.5 g/dL    Quantitative Ig mg/dL    Quantitative IgA: 99 mg/dL    Quantitative IgM: 38 mg/dL    АННА Kappa: 1.55 mg/dL    АННА Lambda: 1.66 mg/dL    Kappa/Lambda Free Light Chain Ratio, Serum: 0.93 Ratio      pthrp <2      Creatinine Trend: 1.32<--, 1.42<--, 1.51<--, 1.71<--, 2.02<--, 3.52<--    Assessment     MARGARET pre/ post renal azotemia, ALANIZ  Hypercalcemia, r/o paraneoplastic' bone source  Improving     Plan  IVF on hold.  Jaimes voiding trial            Mateo Fatima MD

## 2019-12-16 NOTE — PROGRESS NOTE ADULT - PROBLEM SELECTOR PLAN 1
associated with anemia unspecified possible lymphoproliferative disorder   PTH low ,   PTHRP wnl  vitamin d level wnl ( low normal)  ruled out hyperthyroid check tsh wnl  free t4 wnl   spep and upep noted: no m spike ,  no bence israel protein    endocrine consult reviewed and appreciated s/p Aredia on 12/11/19 12/13/19 obtained ct chest abd pelvis ? lobular stomach therefore consulted gi plan for egd on Monday 12/16/19 12/14/19 consulted heme/onc and recc for BM biopsy daughters agree but ask can be done on Tuesday after egd,

## 2019-12-16 NOTE — PROGRESS NOTE ADULT - SUBJECTIVE AND OBJECTIVE BOX
Patient is a 89y old  Female who presents with a chief complaint of Weakness. (10 Dec 2019 06:42)      OVERNIGHT EVENTS: none    MEDICATIONS  (STANDING):  atorvastatin 40 milliGRAM(s) Oral at bedtime  dextrose 5%. 1000 milliLiter(s) (50 mL/Hr) IV Continuous <Continuous>  dextrose 50% Injectable 12.5 Gram(s) IV Push once  dextrose 50% Injectable 25 Gram(s) IV Push once  dextrose 50% Injectable 25 Gram(s) IV Push once  heparin  Injectable 5000 Unit(s) SubCutaneous every 12 hours  insulin lispro (HumaLOG) corrective regimen sliding scale   SubCutaneous three times a day before meals  levETIRAcetam 500 milliGRAM(s) Oral two times a day    MEDICATIONS  (PRN):  albuterol/ipratropium for Nebulization. 3 milliLiter(s) Nebulizer every 6 hours PRN Shortness of Breath and/or Wheezing  dextrose 40% Gel 15 Gram(s) Oral once PRN Blood Glucose LESS THAN 70 milliGRAM(s)/deciliter  glucagon  Injectable 1 milliGRAM(s) IntraMuscular once PRN Glucose LESS THAN 70 milligrams/deciliter    Allergies    No Known Allergies    Intolerances        SUBJECTIVE: in bed in NAD, no acute events overnight     Vital Signs Last 24 Hrs  T(C): 37.2 (16 Dec 2019 05:20), Max: 38 (16 Dec 2019 00:31)  T(F): 98.9 (16 Dec 2019 05:20), Max: 100.4 (16 Dec 2019 00:31)  HR: 86 (16 Dec 2019 05:20) (84 - 107)  BP: 120/66 (16 Dec 2019 05:20) (120/66 - 149/81)  BP(mean): --  RR: 17 (16 Dec 2019 05:20) (17 - 18)  SpO2: 99% (16 Dec 2019 05:20) (99% - 100%)  PHYSICAL EXAM:    GENERAL: NAD, well-groomed, well-developed  HEAD:  Atraumatic, Normocephalic  EYES: EOMI, PERRLA, conjunctiva and sclera clear  ENMT: No tonsillar erythema, exudates, or enlargement; Moist mucous membranes, Good dentition, No lesions  NECK: Supple, No JVD, Normal thyroid  CHEST/LUNG:  improved air entry no wheezing or rhonchi    HEART: Regular rate and rhythm; No murmurs, rubs, or gallops  ABDOMEN: Soft, Nontender, Nondistended; Bowel sounds present  EXTREMITIES:  2+ Peripheral Pulses, No clubbing, cyanosis, or edema BL LE    SKIN: No rashes or lesions  NERVOUS SYSTEM:  Alert & Oriented X1 demented follows commands ;   DTRs 2+ intact and symmetric, sensation intact BL    LABS:                                         7.3    6.89  )-----------( 133      ( 16 Dec 2019 06:14 )             24.3   12-16    145  |  116<H>  |  30<H>  ----------------------------<  143<H>  4.0   |  22  |  1.32<H>    Ca    8.9      16 Dec 2019 06:14  Phos  3.3     12-16  Mg     1.8     12-16        Vitamin D, 1, 25-Dihydroxy (12.10.19 @ 14:48)    Vitamin D, 1, 25-Dihydroxy: 69.9 pg/mL    Parathyroid Hormone Intact, Serum (12.10.19 @ 12:39)    Intact PTH: 4: PTH METHOD: Roche  Guide for Interpretation of PTH and Calcium Results                           Calcium             PTH                           MG/DL               PG/ML  Normal                   8.4-10.5            15-65  Primary  Hyperparathyroidism      >10.5               >50  Non-PTH Hypercalcemia    >10.5               0-20  Hypoparathyroidism       <8.4                0-20  Pseudohypoparathyroid    <8.4                >50  This is intended as a guide only. Factors such as sunlight exposure,  Vitamin D status and renal function should be evaluated along with  clinical presentation. pg/mL      Cultures;   Urinalysis Basic - ( 11 Dec 2019 13:27 )    Color: Yellow / Appearance: very cloudy / S.015 / pH: x  Gluc: x / Ketone: Negative  / Bili: Negative / Urobili: Negative mg/dL   Blood: x / Protein: 100 mg/dL / Nitrite: Negative   Leuk Esterase: Moderate / RBC: 11-25 /HPF / WBC TNTC /HPF   Sq Epi: x / Non Sq Epi: Few / Bacteria: Few      Culture - Urine (collected 10 Dec 2019 09:28)  Source: .Urine Clean Catch (Midstream)  Final Report (11 Dec 2019 10:01):    >=3 organisms. Probable collection contamination.            Calcium, Ionized in AM (12.11.19 @ 09:18)    Calcium, Ionized: 1.59 mmoL/L        CAPILLARY BLOOD GLUCOSE      POCT Blood Glucose.: 191 mg/dL (15 Dec 2019 10:36)  POCT Blood Glucose.: 145 mg/dL (15 Dec 2019 07:21)  POCT Blood Glucose.: 173 mg/dL (14 Dec 2019 21:01)  POCT Blood Glucose.: 178 mg/dL (14 Dec 2019 16:21)    Lipid panel:     CARDIAC MARKERS ( 09 Dec 2019 21:02 )  <.015 ng/mL / x     / x     / x     / x            RADIOLOGY & ADDITIONAL TESTS:    < from: TTE Echo Doppler w/o Cont (19 @ 16:06) >  Summary:   1. Left ventricular ejection fraction, by visual estimation, is 45 to   50%.   2. Normal left ventricular internal cavity size.   3. Spectral Doppler shows impaired relaxation pattern of left   ventricular myocardial filling (Grade I diastolic dysfunction).   4. Normal right ventricular size and function.   5. The left atrium is normal in size.   6. The right atrium is normal in size.   7. There is no evidence of pericardial effusion.   8. Mild mitral annular calcification.   9. Mild to moderate mitral valve regurgitation.  10. Thickening and calcification of the posterior mitral valve leaflet.  11. Moderate tricuspid regurgitation.  12. Structurally normal tricuspid valve, with normal leaflet excursion.  13. Normal trileaflet aortic valve with normal opening.  14. Mild to moderate pulmonic valve regurgitation.  15. Structurally normal pulmonic valve, with normal leaflet excursion.  16. Estimated pulmonary artery systolic pressure is 55.1 mmHg assuming a   right atrial pressure of 10 mmHg, which is consistent with moderate   pulmonary hypertension.  17. Mitral valve mean gradient is 2.7 mmHg consistent with mild mitral   stenosis.      < end of copied text >    Imaging Personally Reviewed:  [ x] YES      Consultant(s) Notes Reviewed:  [x ] YES     Care Discussed with [x ] Consultants [X ] Patient [x ] Family  [x ]    [x ]  Other; RN Patient is a 89y old  Female who presents with a chief complaint of Weakness. (10 Dec 2019 06:42)      OVERNIGHT EVENTS: none    MEDICATIONS  (STANDING):  atorvastatin 40 milliGRAM(s) Oral at bedtime  dextrose 5%. 1000 milliLiter(s) (50 mL/Hr) IV Continuous <Continuous>  dextrose 50% Injectable 12.5 Gram(s) IV Push once  dextrose 50% Injectable 25 Gram(s) IV Push once  dextrose 50% Injectable 25 Gram(s) IV Push once  heparin  Injectable 5000 Unit(s) SubCutaneous every 12 hours  insulin lispro (HumaLOG) corrective regimen sliding scale   SubCutaneous three times a day before meals  levETIRAcetam 500 milliGRAM(s) Oral two times a day    MEDICATIONS  (PRN):  albuterol/ipratropium for Nebulization. 3 milliLiter(s) Nebulizer every 6 hours PRN Shortness of Breath and/or Wheezing  dextrose 40% Gel 15 Gram(s) Oral once PRN Blood Glucose LESS THAN 70 milliGRAM(s)/deciliter  glucagon  Injectable 1 milliGRAM(s) IntraMuscular once PRN Glucose LESS THAN 70 milligrams/deciliter    Allergies    No Known Allergies    Intolerances        SUBJECTIVE: in bed in NAD, no acute events overnight     Vital Signs Last 24 Hrs  T(C): 37.2 (16 Dec 2019 05:20), Max: 38 (16 Dec 2019 00:31)  T(F): 98.9 (16 Dec 2019 05:20), Max: 100.4 (16 Dec 2019 00:31)  HR: 86 (16 Dec 2019 05:20) (84 - 107)  BP: 120/66 (16 Dec 2019 05:20) (120/66 - 149/81)  BP(mean): --  RR: 17 (16 Dec 2019 05:20) (17 - 18)  SpO2: 99% (16 Dec 2019 05:20) (99% - 100%)  PHYSICAL EXAM:    GENERAL: NAD, well-groomed, well-developed  HEAD:  Atraumatic, Normocephalic  EYES: EOMI, PERRLA, conjunctiva and sclera clear  ENMT: No tonsillar erythema, exudates, or enlargement; Moist mucous membranes, Good dentition, No lesions  NECK: Supple, No JVD, Normal thyroid  CHEST/LUNG:  improved air entry , mild decreased bases,. no wheezing or rhonchi    HEART: Regular rate and rhythm; No murmurs, rubs, or gallops  ABDOMEN: Soft, Nontender, Nondistended; Bowel sounds present  EXTREMITIES:  2+ Peripheral Pulses, No clubbing, cyanosis, or edema BL LE    SKIN: No rashes or lesions  NERVOUS SYSTEM:  Alert & Oriented X1 demented follows commands ;   DTRs 2+ intact and symmetric, sensation intact BL    LABS:                                         7.3    6.89  )-----------( 133      ( 16 Dec 2019 06:14 )             24.3   12-16    145  |  116<H>  |  30<H>  ----------------------------<  143<H>  4.0   |  22  |  1.32<H>    Ca    8.9      16 Dec 2019 06:14  Phos  3.3     12-16  Mg     1.8     12-16        Vitamin D, 1, 25-Dihydroxy (12.10.19 @ 14:48)    Vitamin D, 1, 25-Dihydroxy: 69.9 pg/mL    Parathyroid Hormone Intact, Serum (12.10.19 @ 12:39)    Intact PTH: 4: PTH METHOD: Roche  Guide for Interpretation of PTH and Calcium Results                           Calcium             PTH                           MG/DL               PG/ML  Normal                   8.4-10.5            15-65  Primary  Hyperparathyroidism      >10.5               >50  Non-PTH Hypercalcemia    >10.5               0-20  Hypoparathyroidism       <8.4                0-20  Pseudohypoparathyroid    <8.4                >50  This is intended as a guide only. Factors such as sunlight exposure,  Vitamin D status and renal function should be evaluated along with  clinical presentation. pg/mL      Cultures;   Urinalysis Basic - ( 11 Dec 2019 13:27 )    Color: Yellow / Appearance: very cloudy / S.015 / pH: x  Gluc: x / Ketone: Negative  / Bili: Negative / Urobili: Negative mg/dL   Blood: x / Protein: 100 mg/dL / Nitrite: Negative   Leuk Esterase: Moderate / RBC: 11-25 /HPF / WBC TNTC /HPF   Sq Epi: x / Non Sq Epi: Few / Bacteria: Few      Culture - Urine (collected 10 Dec 2019 09:28)  Source: .Urine Clean Catch (Midstream)  Final Report (11 Dec 2019 10:01):    >=3 organisms. Probable collection contamination.            Calcium, Ionized in AM (19 @ 09:18)    Calcium, Ionized: 1.59 mmoL/L        CAPILLARY BLOOD GLUCOSE      POCT Blood Glucose.: 191 mg/dL (15 Dec 2019 10:36)  POCT Blood Glucose.: 145 mg/dL (15 Dec 2019 07:21)  POCT Blood Glucose.: 173 mg/dL (14 Dec 2019 21:01)  POCT Blood Glucose.: 178 mg/dL (14 Dec 2019 16:21)    Lipid panel:     CARDIAC MARKERS ( 09 Dec 2019 21:02 )  <.015 ng/mL / x     / x     / x     / x            RADIOLOGY & ADDITIONAL TESTS:    < from: TTE Echo Doppler w/o Cont (19 @ 16:06) >  Summary:   1. Left ventricular ejection fraction, by visual estimation, is 45 to   50%.   2. Normal left ventricular internal cavity size.   3. Spectral Doppler shows impaired relaxation pattern of left   ventricular myocardial filling (Grade I diastolic dysfunction).   4. Normal right ventricular size and function.   5. The left atrium is normal in size.   6. The right atrium is normal in size.   7. There is no evidence of pericardial effusion.   8. Mild mitral annular calcification.   9. Mild to moderate mitral valve regurgitation.  10. Thickening and calcification of the posterior mitral valve leaflet.  11. Moderate tricuspid regurgitation.  12. Structurally normal tricuspid valve, with normal leaflet excursion.  13. Normal trileaflet aortic valve with normal opening.  14. Mild to moderate pulmonic valve regurgitation.  15. Structurally normal pulmonic valve, with normal leaflet excursion.  16. Estimated pulmonary artery systolic pressure is 55.1 mmHg assuming a   right atrial pressure of 10 mmHg, which is consistent with moderate   pulmonary hypertension.  17. Mitral valve mean gradient is 2.7 mmHg consistent with mild mitral   stenosis.      < end of copied text >    Imaging Personally Reviewed:  [ x] YES      Consultant(s) Notes Reviewed:  [x ] YES     Care Discussed with [x ] Consultants [X ] Patient [x ] Family  [x ]    [x ]  Other; RN

## 2019-12-16 NOTE — PROGRESS NOTE ADULT - PROBLEM SELECTOR PLAN 4
now presumed to be due to UTI and less likely pna.   continue both antibx for now.    urine cx  enterococcus faecalis and blood cx ngtd   wbc is improving on antibx

## 2019-12-16 NOTE — PROGRESS NOTE ADULT - SUBJECTIVE AND OBJECTIVE BOX
INTERVAL History:  Weak  Anemia.        Allergies    No Known Allergies    Intolerances        MEDICATIONS  (STANDING):  atorvastatin 40 milliGRAM(s) Oral at bedtime  azithromycin  IVPB 500 milliGRAM(s) IV Intermittent every 24 hours  azithromycin  IVPB      dextrose 5%. 1000 milliLiter(s) (50 mL/Hr) IV Continuous <Continuous>  dextrose 50% Injectable 12.5 Gram(s) IV Push once  dextrose 50% Injectable 25 Gram(s) IV Push once  dextrose 50% Injectable 25 Gram(s) IV Push once  heparin  Injectable 5000 Unit(s) SubCutaneous every 12 hours  insulin lispro (HumaLOG) corrective regimen sliding scale   SubCutaneous three times a day before meals  levETIRAcetam 500 milliGRAM(s) Oral two times a day    MEDICATIONS  (PRN):  albuterol/ipratropium for Nebulization. 3 milliLiter(s) Nebulizer every 6 hours PRN Shortness of Breath and/or Wheezing  dextrose 40% Gel 15 Gram(s) Oral once PRN Blood Glucose LESS THAN 70 milliGRAM(s)/deciliter  glucagon  Injectable 1 milliGRAM(s) IntraMuscular once PRN Glucose LESS THAN 70 milligrams/deciliter      Vital Signs Last 24 Hrs  T(C): 37.2 (16 Dec 2019 05:20), Max: 38 (16 Dec 2019 00:31)  T(F): 98.9 (16 Dec 2019 05:20), Max: 100.4 (16 Dec 2019 00:31)  HR: 86 (16 Dec 2019 05:20) (84 - 107)  BP: 120/66 (16 Dec 2019 05:20) (120/66 - 149/81)  BP(mean): --  RR: 17 (16 Dec 2019 05:20) (17 - 18)  SpO2: 99% (16 Dec 2019 05:20) (99% - 100%)    PHYSICAL EXAM:  Weak  Anemia    EYES: EOMI, PERRLA, conjunctiva and sclera clear  NECK: Supple, No JVD, Normal thyroid  CHEST/LUNG: Clear to percussion bilaterally; No rales, rhonchi,   HEART: Regular rate and rhythm;   ABDOMEN: Soft, Nontender.  LYMPH: No lymphadenopathy noted        LABS:                        7.3    6.89  )-----------( 133      ( 16 Dec 2019 06:14 )             24.3     12-16    145  |  116<H>  |  30<H>  ----------------------------<  143<H>  4.0   |  22  |  1.32<H>    Ca    8.9      16 Dec 2019 06:14  Phos  3.3     12-16  Mg     1.8     12-16              RADIOLOGY & ADDITIONAL STUDIES:    PATHOLOGY:

## 2019-12-16 NOTE — PROGRESS NOTE ADULT - PROBLEM SELECTOR PLAN 6
no old labs to compare    presumed due to acute urinary retention see below .  s/p de jesus on 12/11/19 and improving  daily , appreciate renal note    eosinophils positive

## 2019-12-16 NOTE — PROGRESS NOTE ADULT - SUBJECTIVE AND OBJECTIVE BOX
Procedure:           Upper GI endoscopy 12-16-19 @ 16:40    Indications:                     Monitored Anesthesia Care Provided by :     ____________________________________________________________________________________________________  Procedure:           Pre-Anesthesia Assessment:                       - Prior to the procedure, a History and Physical was performed, and patient                        medications and allergies were reviewed. The patient is competent. The risks                        and benefits of the procedure and the sedation options and risks were                        discussed with the patient. All questions were answered and informed consent                        was obtained. Patient identification and proposed procedure were verified by                        the physician, the nurse and the anesthesiologist in the procedure room.                        Mental Status Examination:    alert and oriented. Airway Examination: normal                        oropharyngeal airway and neck mobility. Respiratory Examination: clear to                        auscultation. CV Examination: normal. Prophylactic Antibiotics: -The patient                        does not require prophylactic antibiotics.                        Grade Assessment:  After                        reviewing the risks and benefits, the patient was deemed in satisfactory                        condition to undergo the procedure. The anesthesia plan was to use monitored                        anesthesia care (MAC). Immediately prior to administration of medications,                        the patient was re-assessed for adequacy to receive sedatives. The heart        		     rate, respiratory rate, oxygen saturations, blood pressure, adequacy of                        pulmonary ventilation, and response to care were monitored throughout the                        procedure. The physical status of the patient was re-assessed after the                        procedure.                       After obtaining informed consent, the endoscope was passed under direct                        vision. Throughout the procedure, the patient's blood pressure, pulse, and                        oxygen saturations were monitored continuously. The Endoscope was introduced                        through the mouth, and advanced to the second part of duodenum. The upper GI                        endoscopy was accomplished with ease. The patient tolerated the procedure                        well.    ESOPHAGUS: WNL    STOMACH:  Mild antral gastritis  s/p biopsy    DUODENUM: WNL      Assessment : As above    PLAN :  Check histology

## 2019-12-16 NOTE — PROGRESS NOTE ADULT - ATTENDING COMMENTS
d/w daughters at bedside [facility]  number is [phone]  12/14/19 they are not on same page regarding dnr status. they will review patient  living will and discuss more in detail

## 2019-12-16 NOTE — PROGRESS NOTE ADULT - PROBLEM SELECTOR PROBLEM 7
How Severe Is Your Skin Lesion?: mild Have Your Skin Lesions Been Treated?: not been treated Is This A New Presentation, Or A Follow-Up?: Skin Lesions Acute urinary retention Calm

## 2019-12-16 NOTE — PROGRESS NOTE ADULT - SUBJECTIVE AND OBJECTIVE BOX
Patient seen and examined bedside resting comfortably.  No complaints offered.   Denies nausea and vomiting. Tolerating diet.  Flatus/BM. +  Denies chest pain, dyspnea, cough.    T(F): 98.9 (12-16-19 @ 05:20), Max: 100.4 (12-16-19 @ 00:31)  HR: 86 (12-16-19 @ 05:20) (84 - 107)  BP: 120/66 (12-16-19 @ 05:20) (120/66 - 149/81)  RR: 17 (12-16-19 @ 05:20) (17 - 18)  SpO2: 99% (12-16-19 @ 05:20) (99% - 100%)    CAPILLARY BLOOD GLUCOSE  POCT Blood Glucose.: 123 mg/dL (16 Dec 2019 08:21)  POCT Blood Glucose.: 172 mg/dL (15 Dec 2019 22:01)  POCT Blood Glucose.: 211 mg/dL (15 Dec 2019 15:33)  POCT Blood Glucose.: 191 mg/dL (15 Dec 2019 10:36)      PHYSICAL EXAM:  General: NAD  Neuro:  Alert & oriented x 3  Abdomen: soft, NTND.  : De Jesus cath draining clear urine.    LABS:                        7.3    6.89  )-----------( 133      ( 16 Dec 2019 06:14 )             24.3     12-16    145  |  116<H>  |  30<H>  ----------------------------<  143<H>  4.0   |  22  |  1.32<H>    Ca    8.9      16 Dec 2019 06:14  Phos  3.3     12-16  Mg     1.8     12-16        I&O's Detail    15 Dec 2019 07:01  -  16 Dec 2019 07:00  --------------------------------------------------------  IN:  Total IN: 0 mL    OUT:    Indwelling Catheter - Urethral: 275 mL  Total OUT: 275 mL    Total NET: -275 mL          Impression:   urinary retention  UTI  elevated BUN/CR - improving      Plan:  -continue VTE prophylaxis   - continue medical f/u  -f/u AM labs  -continue de jesus cath till renal function normalizes.  - will discuss pt with Dr. Pérez

## 2019-12-17 DIAGNOSIS — R50.9 FEVER, UNSPECIFIED: ICD-10-CM

## 2019-12-17 LAB
ANION GAP SERPL CALC-SCNC: 7 MMOL/L — SIGNIFICANT CHANGE UP (ref 5–17)
BUN SERPL-MCNC: 29 MG/DL — HIGH (ref 7–23)
CALCIUM SERPL-MCNC: 8.9 MG/DL — SIGNIFICANT CHANGE UP (ref 8.5–10.1)
CHLORIDE SERPL-SCNC: 111 MMOL/L — HIGH (ref 96–108)
CO2 SERPL-SCNC: 24 MMOL/L — SIGNIFICANT CHANGE UP (ref 22–31)
CREAT SERPL-MCNC: 1.41 MG/DL — HIGH (ref 0.5–1.3)
GLUCOSE BLDC GLUCOMTR-MCNC: 162 MG/DL — HIGH (ref 70–99)
GLUCOSE BLDC GLUCOMTR-MCNC: 170 MG/DL — HIGH (ref 70–99)
GLUCOSE BLDC GLUCOMTR-MCNC: 193 MG/DL — HIGH (ref 70–99)
GLUCOSE BLDC GLUCOMTR-MCNC: 196 MG/DL — HIGH (ref 70–99)
GLUCOSE SERPL-MCNC: 157 MG/DL — HIGH (ref 70–99)
HCT VFR BLD CALC: 27.1 % — LOW (ref 34.5–45)
HGB BLD-MCNC: 8.2 G/DL — LOW (ref 11.5–15.5)
MAGNESIUM SERPL-MCNC: 1.8 MG/DL — SIGNIFICANT CHANGE UP (ref 1.6–2.6)
MCHC RBC-ENTMCNC: 24.3 PG — LOW (ref 27–34)
MCHC RBC-ENTMCNC: 30.3 GM/DL — LOW (ref 32–36)
MCV RBC AUTO: 80.4 FL — SIGNIFICANT CHANGE UP (ref 80–100)
NRBC # BLD: 0 /100 WBCS — SIGNIFICANT CHANGE UP (ref 0–0)
PHOSPHATE SERPL-MCNC: 3.6 MG/DL — SIGNIFICANT CHANGE UP (ref 2.5–4.5)
PLATELET # BLD AUTO: 132 K/UL — LOW (ref 150–400)
POTASSIUM SERPL-MCNC: 4 MMOL/L — SIGNIFICANT CHANGE UP (ref 3.5–5.3)
POTASSIUM SERPL-SCNC: 4 MMOL/L — SIGNIFICANT CHANGE UP (ref 3.5–5.3)
RBC # BLD: 3.37 M/UL — LOW (ref 3.8–5.2)
RBC # FLD: 16.3 % — HIGH (ref 10.3–14.5)
SODIUM SERPL-SCNC: 142 MMOL/L — SIGNIFICANT CHANGE UP (ref 135–145)
WBC # BLD: 6.91 K/UL — SIGNIFICANT CHANGE UP (ref 3.8–10.5)
WBC # FLD AUTO: 6.91 K/UL — SIGNIFICANT CHANGE UP (ref 3.8–10.5)

## 2019-12-17 PROCEDURE — 99221 1ST HOSP IP/OBS SF/LOW 40: CPT

## 2019-12-17 PROCEDURE — 99233 SBSQ HOSP IP/OBS HIGH 50: CPT

## 2019-12-17 RX ADMIN — Medication 3 MILLILITER(S): at 14:58

## 2019-12-17 RX ADMIN — Medication 1: at 17:48

## 2019-12-17 RX ADMIN — PIPERACILLIN AND TAZOBACTAM 25 GRAM(S): 4; .5 INJECTION, POWDER, LYOPHILIZED, FOR SOLUTION INTRAVENOUS at 05:55

## 2019-12-17 RX ADMIN — Medication 1: at 08:12

## 2019-12-17 RX ADMIN — PIPERACILLIN AND TAZOBACTAM 25 GRAM(S): 4; .5 INJECTION, POWDER, LYOPHILIZED, FOR SOLUTION INTRAVENOUS at 16:12

## 2019-12-17 RX ADMIN — ATORVASTATIN CALCIUM 40 MILLIGRAM(S): 80 TABLET, FILM COATED ORAL at 22:32

## 2019-12-17 RX ADMIN — HEPARIN SODIUM 5000 UNIT(S): 5000 INJECTION INTRAVENOUS; SUBCUTANEOUS at 18:44

## 2019-12-17 RX ADMIN — LEVETIRACETAM 420 MILLIGRAM(S): 250 TABLET, FILM COATED ORAL at 18:48

## 2019-12-17 RX ADMIN — Medication 1: at 11:57

## 2019-12-17 RX ADMIN — PIPERACILLIN AND TAZOBACTAM 25 GRAM(S): 4; .5 INJECTION, POWDER, LYOPHILIZED, FOR SOLUTION INTRAVENOUS at 22:32

## 2019-12-17 RX ADMIN — HEPARIN SODIUM 5000 UNIT(S): 5000 INJECTION INTRAVENOUS; SUBCUTANEOUS at 05:55

## 2019-12-17 NOTE — DIETITIAN INITIAL EVALUATION ADULT. - CONTINUE CURRENT NUTRITION CARE PLAN
yes/recommend add Low sodium diet & consistency of diet as per swallow evaluation recommend add Low sodium, consistent carbohydrate c evening snack  & consistency of diet as per swallow evaluation/yes

## 2019-12-17 NOTE — PROGRESS NOTE ADULT - SUBJECTIVE AND OBJECTIVE BOX
INTERVAL History:  Weak  Tired  UTI    Allergies    No Known Allergies    Intolerances        MEDICATIONS  (STANDING):  atorvastatin 40 milliGRAM(s) Oral at bedtime  dextrose 5%. 1000 milliLiter(s) (50 mL/Hr) IV Continuous <Continuous>  dextrose 50% Injectable 12.5 Gram(s) IV Push once  dextrose 50% Injectable 25 Gram(s) IV Push once  dextrose 50% Injectable 25 Gram(s) IV Push once  heparin  Injectable 5000 Unit(s) SubCutaneous every 12 hours  insulin lispro (HumaLOG) corrective regimen sliding scale   SubCutaneous three times a day before meals  levETIRAcetam  IVPB 500 milliGRAM(s) IV Intermittent every 12 hours  piperacillin/tazobactam IVPB.. 3.375 Gram(s) IV Intermittent every 8 hours    MEDICATIONS  (PRN):  acetaminophen  Suppository .. 650 milliGRAM(s) Rectal every 6 hours PRN Temp greater or equal to 38C (100.4F), Mild Pain (1 - 3)  albuterol/ipratropium for Nebulization. 3 milliLiter(s) Nebulizer every 6 hours PRN Shortness of Breath and/or Wheezing  dextrose 40% Gel 15 Gram(s) Oral once PRN Blood Glucose LESS THAN 70 milliGRAM(s)/deciliter  glucagon  Injectable 1 milliGRAM(s) IntraMuscular once PRN Glucose LESS THAN 70 milligrams/deciliter      Vital Signs Last 24 Hrs  T(C): 37.3 (17 Dec 2019 05:35), Max: 38.6 (16 Dec 2019 19:41)  T(F): 99.1 (17 Dec 2019 05:35), Max: 101.4 (16 Dec 2019 19:41)  HR: 92 (17 Dec 2019 05:35) (84 - 136)  BP: 125/66 (17 Dec 2019 05:35) (109/62 - 154/92)  BP(mean): --  RR: 16 (17 Dec 2019 05:35) (16 - 28)  SpO2: 98% (17 Dec 2019 05:35) (96% - 100%)    PHYSICAL EXAM:    Pallor  EYES: EOMI, PERRLA, conjunctiva and sclera clear  NECK: Supple, No JVD, Normal thyroid  CHEST/LUNG: Clear to percussion bilaterally; No rales, rhonchi,   HEART: Regular rate and rhythm;   ABDOMEN: Soft, Nontender.          LABS:                        8.2    6.91  )-----------( 132      ( 17 Dec 2019 07:32 )             27.1     12-17    142  |  111<H>  |  29<H>  ----------------------------<  157<H>  4.0   |  24  |  1.41<H>    Ca    8.9      17 Dec 2019 07:32  Phos  3.6     12-17  Mg     1.8     12-17              RADIOLOGY & ADDITIONAL STUDIES:    PATHOLOGY:

## 2019-12-17 NOTE — PROGRESS NOTE ADULT - PROBLEM SELECTOR PLAN 2
last night post egd    fever etiology unclear   septic w/u in progress   consult ID   empriric zosyn    (completed antibx for UTI and possible PNA on 12/16/19) last night post egd    fever etiology unclear   septic w/u in progress   will consult ID   empiric zosyn    (completed antibx for UTI and possible PNA on 12/16/19)

## 2019-12-17 NOTE — PROGRESS NOTE ADULT - PROBLEM SELECTOR PLAN 8
echo as above   assumed chronic with an acute exacerbation during hospital due to ivf   resolved s/p diuretic   continue  diuretic prn

## 2019-12-17 NOTE — DIETITIAN INITIAL EVALUATION ADULT. - FACTORS AFF FOOD INTAKE
difficulty chewing/other (specify)/difficulty swallowing/pt c swallowing difficulty yesterday post EGD, c r/o aspiration, swallow evaluation ordered, pt w/o diet order @ present, previously on Dysphagia 2 Mechanical Soft - Thin Liquids, pt c partial upper & lower dentures

## 2019-12-17 NOTE — PROGRESS NOTE ADULT - PROBLEM SELECTOR PLAN 1
associated with anemia unspecified possible lymphoproliferative disorder   PTH low ,   PTHRP wnl  vitamin d level wnl ( low normal)  ruled out hyperthyroid check tsh wnl  free t4 wnl   spep and upep noted: no m spike ,  no bence israel protein    endocrine consult reviewed and appreciated s/p Aredia on 12/11/19 12/13/19 obtained ct chest abd pelvis ? lobular stomach therefore consulted gi s/p  egd on Monday 12/16/19 c/w gastritis no mass      12/14/19 consulted heme/onc and recc for BM biopsy daughters agree but ask can be done on Tuesday after egd,

## 2019-12-17 NOTE — SWALLOW BEDSIDE ASSESSMENT ADULT - MODE OF PRESENTATION
spoon/fed by clinician HOHA/cup/self fed/fed by clinician self fed fed by clinician fed by clinician/cup/self fed/straw

## 2019-12-17 NOTE — CHART NOTE - NSCHARTNOTEFT_GEN_A_CORE
IR consulted for bone marrow biopsy to r/o lymphoma  Pt has fever with episodes of tachycardia  Also tried contacting daughter Mary with no avail but left a voicemail  -will set up biopsy for Friday  -full consult to follow

## 2019-12-17 NOTE — SWALLOW BEDSIDE ASSESSMENT ADULT - SLP GENERAL OBSERVATIONS
pt seen bedside alert and oriented x2 when asked if she had a procedure/test yesterday- pt stated "I don't remember". pt responded to autobiographical/orientation questions with decreased accuracy, noted reduced initiation and she was able to follow one step directions.

## 2019-12-17 NOTE — PROGRESS NOTE ADULT - SUBJECTIVE AND OBJECTIVE BOX
Patient is a 89y old  Female who presents with a chief complaint of Weakness. (17 Dec 2019 20:35)      Interval History: No change in status   serum Calcium is at 8.9    MEDICATIONS  (STANDING):  atorvastatin 40 milliGRAM(s) Oral at bedtime  dextrose 5%. 1000 milliLiter(s) (50 mL/Hr) IV Continuous <Continuous>  dextrose 50% Injectable 12.5 Gram(s) IV Push once  dextrose 50% Injectable 25 Gram(s) IV Push once  dextrose 50% Injectable 25 Gram(s) IV Push once  heparin  Injectable 5000 Unit(s) SubCutaneous every 12 hours  insulin lispro (HumaLOG) corrective regimen sliding scale   SubCutaneous three times a day before meals  levETIRAcetam  IVPB 500 milliGRAM(s) IV Intermittent every 12 hours  piperacillin/tazobactam IVPB.. 3.375 Gram(s) IV Intermittent every 8 hours    MEDICATIONS  (PRN):  acetaminophen  Suppository .. 650 milliGRAM(s) Rectal every 6 hours PRN Temp greater or equal to 38C (100.4F), Mild Pain (1 - 3)  albuterol/ipratropium for Nebulization. 3 milliLiter(s) Nebulizer every 6 hours PRN Shortness of Breath and/or Wheezing  dextrose 40% Gel 15 Gram(s) Oral once PRN Blood Glucose LESS THAN 70 milliGRAM(s)/deciliter  glucagon  Injectable 1 milliGRAM(s) IntraMuscular once PRN Glucose LESS THAN 70 milligrams/deciliter      Allergies    No Known Allergies    Intolerances        REVIEW OF SYSTEMS:  CONSTITUTIONAL: no changes    Vital Signs Last 24 Hrs  T(C): 37.8 (17 Dec 2019 17:45), Max: 38.6 (16 Dec 2019 21:41)  T(F): 100 (17 Dec 2019 17:45), Max: 101.4 (16 Dec 2019 21:41)  HR: 98 (17 Dec 2019 17:45) (87 - 121)  BP: 126/66 (17 Dec 2019 17:45) (109/62 - 135/68)  BP(mean): --  RR: 17 (17 Dec 2019 17:45) (16 - 19)  SpO2: 97% (17 Dec 2019 17:45) (96% - 100%)    PHYSICAL EXAM:  GENERAL:  no changes   HEAD: Atraumatic, Normocephalic  EYES: PERRLA, conjunctiva and sclera clear  ENMT: No tonsillar erythema, exudates, or enlargement; Moist mucous membranes, Good dentition, No lesions  NECK: Supple, No JVD, Normal thyroid  NERVOUS SYSTEM:  Alert & Oriented X3, Good concentration; Motor Strength 5/5 B/L upper and lower extremities  CHEST/LUNG: Clear to auscultaion bilaterally; No rales, rhonchi, wheezing, or rubs    LABS:        CAPILLARY BLOOD GLUCOSE      POCT Blood Glucose.: 196 mg/dL (17 Dec 2019 17:46)  POCT Blood Glucose.: 170 mg/dL (17 Dec 2019 11:56)  POCT Blood Glucose.: 162 mg/dL (17 Dec 2019 08:12)  POCT Blood Glucose.: 176 mg/dL (16 Dec 2019 22:37)    Lipid panel:           Thyroid:  Diabetes Tests:  Parathyroid Panel:  Adrenals:  RADIOLOGY & ADDITIONAL TESTS:    Imaging Personally Reviewed:  [ ] YES  [ ] NO    Consultant(s) Notes Reviewed:  [ ] YES  [ ] NO    Care Discussed with Consultants/Other Providers [ ] YES  [ ] NO

## 2019-12-17 NOTE — DIETITIAN INITIAL EVALUATION ADULT. - OTHER INFO
Pt is a poor historian, confusion noted, was unable to state date of birth accurately.   Pt lived c family PTA, family was not present to obtain diet hx.  Pt was on metformin for DM PTA as per home medication list.  Current problem dx include MARGARET, acute on chronic CHF.

## 2019-12-17 NOTE — PROGRESS NOTE ADULT - ATTENDING COMMENTS
d/w daughters at bedside [facility]  number is [phone]  12/14/19 they are not on same page regarding dnr status. they will review patient  living will and discuss more in detail d/w daughters at bedside [facility]  number is [phone]  12/14/19 they are not on same page regarding dnr status. they will review patient  living will and discuss more in detail

## 2019-12-17 NOTE — CONSULT NOTE ADULT - SUBJECTIVE AND OBJECTIVE BOX
Infectious Diseases - Attending at Dr. Lai    HPI:  89 y/o Female PMH of HTN, DM2, HL, seizure d/o, gout presents to ED for eval of increasing weakness and change in mental status over the past few weeks.  Family states some days pt is at her baseline, then other times she is more lethargic, noted drooling.  Family states pt taken to outside hospital twice for sx, work up neg & dc home.  Family states pt has been compliant w her meds, no known grand mal seizures.  No fall/head trauma.  Pt currently denies pain, denies all complaints; she is poor historian, on memantine, possible dementia. (10 Dec 2019 06:42)      PAST MEDICAL & SURGICAL HISTORY:  DM (diabetes mellitus)  Hyperlipidemia  Gout  HTN (hypertension)  CVA (cerebral vascular accident): april 2019  No significant past surgical history      Allergies    No Known Allergies    Intolerances        FAMILY HISTORY:  No pertinent family history in first degree relatives      SOCIAL HISTORY:    REVIEW OF SYSTEMS:    Constitutional: No fever, weight loss or fatigue  Eyes: No eye pain, visual disturbances, or discharge  ENT:  No difficulty hearing, tinnitus, vertigo; No sinus or throat pain  Neck: No pain or stiffness  Respiratory: No cough, wheezing, chills or hemoptysis  Cardiovascular: No chest pain, palpitations, shortness of breath, dizziness or leg swelling  Gastrointestinal: No abdominal or epigastric pain. No nausea, vomiting or hematemesis; No diarrhea or constipation. No melena or hematochezia.  Genitourinary: No dysuria, frequency, hematuria or incontinence  Neurological: No headaches, memory loss, loss of strength, numbness or tremors  Skin: No itching, burning, rashes or lesions       MEDICATIONS  (STANDING):  atorvastatin 40 milliGRAM(s) Oral at bedtime  dextrose 5%. 1000 milliLiter(s) (50 mL/Hr) IV Continuous <Continuous>  dextrose 50% Injectable 12.5 Gram(s) IV Push once  dextrose 50% Injectable 25 Gram(s) IV Push once  dextrose 50% Injectable 25 Gram(s) IV Push once  heparin  Injectable 5000 Unit(s) SubCutaneous every 12 hours  insulin lispro (HumaLOG) corrective regimen sliding scale   SubCutaneous three times a day before meals  levETIRAcetam  IVPB 500 milliGRAM(s) IV Intermittent every 12 hours  piperacillin/tazobactam IVPB.. 3.375 Gram(s) IV Intermittent every 8 hours    MEDICATIONS  (PRN):  acetaminophen  Suppository .. 650 milliGRAM(s) Rectal every 6 hours PRN Temp greater or equal to 38C (100.4F), Mild Pain (1 - 3)  albuterol/ipratropium for Nebulization. 3 milliLiter(s) Nebulizer every 6 hours PRN Shortness of Breath and/or Wheezing  dextrose 40% Gel 15 Gram(s) Oral once PRN Blood Glucose LESS THAN 70 milliGRAM(s)/deciliter  glucagon  Injectable 1 milliGRAM(s) IntraMuscular once PRN Glucose LESS THAN 70 milligrams/deciliter      Vital Signs Last 24 Hrs  T(C): 36.6 (17 Dec 2019 10:48), Max: 38.6 (16 Dec 2019 19:41)  T(F): 97.9 (17 Dec 2019 10:48), Max: 101.4 (16 Dec 2019 19:41)  HR: 102 (17 Dec 2019 16:06) (89 - 136)  BP: 135/68 (17 Dec 2019 16:06) (109/62 - 154/92)  BP(mean): --  RR: 16 (17 Dec 2019 10:48) (16 - 28)  SpO2: 98% (17 Dec 2019 16:06) (96% - 100%)    PHYSICAL EXAM:    Constitutional: NAD, well-groomed, well-developed  HEENT: PERRLA, EOMI, Normal Hearing, MMM  Neck: No LAD, No JVD  Back: Normal spine flexure, No CVA tenderness  Respiratory: CTAB/L  Cardiovascular: S1 and S2, RRR, no M/G/R  Gastrointestinal: BS+, soft, NT/ND  Extremities: No peripheral edema  Vascular: 2+ peripheral pulses  Neurological: A/O x 3, no focal deficits  Skin: No rashes      LABS:                        8.2    6.91  )-----------( 132      ( 17 Dec 2019 07:32 )             27.1     12-17    142  |  111<H>  |  29<H>  ----------------------------<  157<H>  4.0   |  24  |  1.41<H>    Ca    8.9      17 Dec 2019 07:32  Phos  3.6     12-17  Mg     1.8     12-17            MICROBIOLOGY:  RECENT CULTURES:  12-11 .Urine Catheterized Enterococcus faecalis XXXX   Enterococcus faecalis    12-11 .Blood Blood-Peripheral XXXX XXXX   No growth at 5 days.          RADIOLOGY & ADDITIONAL STUDIES: Infectious Diseases - Attending at Dr. Lai    HPI:  89 y/o Female PMH of HTN, DM2, HL, seizure d/o, gout presents to ED for eval of increasing weakness and change in mental status over the past few weeks.  Family states some days pt is at her baseline, then other times she is more lethargic, noted drooling.  Family states pt taken to outside hospital twice for sx, work up neg & dc home.  Family states pt has been compliant w her meds, no known grand mal seizures.  No fall/head trauma.  Pt currently denies pain, denies all complaints; she is poor historian, on memantine, possible dementia. (10 Dec 2019 06:42).  Pt was intially admitted with a probable UTI ,Acute urinary retention and MARGARET and de jesus was placed and was started on IV Rocephin ,Urine c/s later grew enterococcus . AS leukocytosis was already resolving  the antibiotics was not changed .Pt also was getting azithro for a possible pneumonia   CT abdo abd Chest was performed . She was found to have a suspicious lesion on stomach and underwent EGD yesterday .pt is having fever since yesterday as well .New blood and urine c/s has been sent .She is awake but denies any c/o . antibiotics have been switched to Zosyn now .        PAST MEDICAL & SURGICAL HISTORY:  DM (diabetes mellitus)  Hyperlipidemia  Gout  HTN (hypertension)  CVA (cerebral vascular accident): april 2019  No significant past surgical history      Allergies    No Known Allergies    Intolerances        FAMILY HISTORY:  No pertinent family history in first degree relatives      SOCIAL HISTORY: non smoker    REVIEW OF SYSTEMS:    Constitutional: +fever, fatigue  Eyes: No eye pain, visual disturbances, or discharge  ENT:  No difficulty hearing, tinnitus, vertigo; No sinus or throat pain  Neck: No pain or stiffness  Respiratory: No cough, wheezing, hemoptysis  Cardiovascular: No chest pain, palpitations, shortness of breath, dizziness or leg swelling  Gastrointestinal: No abdominal or epigastric pain. No nausea, vomiting or hematemesis; No diarrhea or constipation. No melena or hematochezia.  Genitourinary: No dysuria, frequency, hematuria or incontinence  Neurological: No headaches, memory loss, loss of strength, numbness or tremors  Skin: No itching, burning, rashes or lesions       MEDICATIONS  (STANDING):  atorvastatin 40 milliGRAM(s) Oral at bedtime  dextrose 5%. 1000 milliLiter(s) (50 mL/Hr) IV Continuous <Continuous>  dextrose 50% Injectable 12.5 Gram(s) IV Push once  dextrose 50% Injectable 25 Gram(s) IV Push once  dextrose 50% Injectable 25 Gram(s) IV Push once  heparin  Injectable 5000 Unit(s) SubCutaneous every 12 hours  insulin lispro (HumaLOG) corrective regimen sliding scale   SubCutaneous three times a day before meals  levETIRAcetam  IVPB 500 milliGRAM(s) IV Intermittent every 12 hours  piperacillin/tazobactam IVPB.. 3.375 Gram(s) IV Intermittent every 8 hours    MEDICATIONS  (PRN):  acetaminophen  Suppository .. 650 milliGRAM(s) Rectal every 6 hours PRN Temp greater or equal to 38C (100.4F), Mild Pain (1 - 3)  albuterol/ipratropium for Nebulization. 3 milliLiter(s) Nebulizer every 6 hours PRN Shortness of Breath and/or Wheezing  dextrose 40% Gel 15 Gram(s) Oral once PRN Blood Glucose LESS THAN 70 milliGRAM(s)/deciliter  glucagon  Injectable 1 milliGRAM(s) IntraMuscular once PRN Glucose LESS THAN 70 milligrams/deciliter      Vital Signs Last 24 Hrs  T(C): 36.6 (17 Dec 2019 10:48), Max: 38.6 (16 Dec 2019 19:41)  T(F): 97.9 (17 Dec 2019 10:48), Max: 101.4 (16 Dec 2019 19:41)  HR: 102 (17 Dec 2019 16:06) (89 - 136)  BP: 135/68 (17 Dec 2019 16:06) (109/62 - 154/92)  BP(mean): --  RR: 16 (17 Dec 2019 10:48) (16 - 28)  SpO2: 98% (17 Dec 2019 16:06) (96% - 100%)    PHYSICAL EXAM:    Constitutional: NAD, well-groomed, well-developed  HEENT: PERRLA, EOMI, Normal Hearing, MMM  Neck: No LAD, No JVD  Back: Normal spine flexure, No CVA tenderness  Respiratory: CTAB/L  Cardiovascular: S1 and S2, RRR, no M/G/R  Gastrointestinal: BS+, soft, NT/ND  Extremities: No peripheral edema  Vascular: 2+ peripheral pulses  Neurological: no focal deficits  Skin: No rashes      LABS:                        8.2    6.91  )-----------( 132      ( 17 Dec 2019 07:32 )             27.1     12-17    142  |  111<H>  |  29<H>  ----------------------------<  157<H>  4.0   |  24  |  1.41<H>    Ca    8.9      17 Dec 2019 07:32  Phos  3.6     12-17  Mg     1.8     12-17    Urinalysis + Microscopic Examination (12.11.19 @ 13:27)    Urine Appearance: very cloudy    Urobilinogen: Negative mg/dL    Specific Gravity: 1.015    Protein, Urine: 100 mg/dL    pH Urine: 6.0    Leukocyte Esterase Concentration: Moderate    Nitrite: Negative    Ketone - Urine: Negative    Bilirubin: Negative    Color: Yellow    Glucose Qualitative, Urine: 1000 mg/dL    Blood, Urine: Large    Red Blood Cell - Urine: 11-25 /HPF    White Blood Cell - Urine: TNTC /HPF    Epithelial Cells: Few    Bacteria: Few    Comment - Urine: URATOS AMORPHOUS SEEN            MICROBIOLOGY:  RECENT CULTURES:  12-11 .Urine Catheterized Enterococcus faecalis XXXX   Enterococcus faecalis    12-11 .Blood Blood-Peripheral XXXX XXXX   No growth at 5 days.          RADIOLOGY & ADDITIONAL STUDIES:

## 2019-12-17 NOTE — DIETITIAN INITIAL EVALUATION ADULT. - PERTINENT MEDS FT
MEDICATIONS  (STANDING):  atorvastatin 40 milliGRAM(s) Oral at bedtime  dextrose 5%. 1000 milliLiter(s) (50 mL/Hr) IV Continuous <Continuous>  dextrose 50% Injectable 12.5 Gram(s) IV Push once  dextrose 50% Injectable 25 Gram(s) IV Push once  dextrose 50% Injectable 25 Gram(s) IV Push once  heparin  Injectable 5000 Unit(s) SubCutaneous every 12 hours  insulin lispro (HumaLOG) corrective regimen sliding scale   SubCutaneous three times a day before meals  levETIRAcetam  IVPB 500 milliGRAM(s) IV Intermittent every 12 hours  piperacillin/tazobactam IVPB.. 3.375 Gram(s) IV Intermittent every 8 hours    MEDICATIONS  (PRN):  acetaminophen  Suppository .. 650 milliGRAM(s) Rectal every 6 hours PRN Temp greater or equal to 38C (100.4F), Mild Pain (1 - 3)  albuterol/ipratropium for Nebulization. 3 milliLiter(s) Nebulizer every 6 hours PRN Shortness of Breath and/or Wheezing  dextrose 40% Gel 15 Gram(s) Oral once PRN Blood Glucose LESS THAN 70 milliGRAM(s)/deciliter  glucagon  Injectable 1 milliGRAM(s) IntraMuscular once PRN Glucose LESS THAN 70 milligrams/deciliter

## 2019-12-17 NOTE — PROGRESS NOTE ADULT - SUBJECTIVE AND OBJECTIVE BOX
S: Void trial    O: Vital Signs Last 24 Hrs  T(C): 37.8 (17 Dec 2019 17:45), Max: 38.6 (16 Dec 2019 21:41)  T(F): 100 (17 Dec 2019 17:45), Max: 101.4 (16 Dec 2019 21:41)  HR: 98 (17 Dec 2019 17:45) (87 - 136)  BP: 126/66 (17 Dec 2019 17:45) (109/62 - 146/85)  BP(mean): --  RR: 17 (17 Dec 2019 17:45) (16 - 19)  SpO2: 97% (17 Dec 2019 17:45) (96% - 100%)    NAD  ABD soft NT/ND,      12-17    142  |  111<H>  |  29<H>  ----------------------------<  157<H>  4.0   |  24  |  1.41<H>    Ca    8.9      17 Dec 2019 07:32  Phos  3.6     12-17  Mg     1.8     12-17                          8.2    6.91  )-----------( 132      ( 17 Dec 2019 07:32 )             27.1

## 2019-12-17 NOTE — PROGRESS NOTE ADULT - SUBJECTIVE AND OBJECTIVE BOX
Patient seen and examined bedside resting comfortably.  No complaints offered.   Denies nausea and vomiting. Tolerating diet.  Flatus/BM. +  Denies chest pain, dyspnea, cough.    Vital Signs Last 24 Hrs  T(F): 99.1 (12-17-19 @ 05:35), Max: 101.4 (12-16-19 @ 19:41)  HR: 92 (12-17-19 @ 05:35)  BP: 125/66 (12-17-19 @ 05:35)  RR: 16 (12-17-19 @ 05:35)  SpO2: 98% (12-17-19 @ 05:35)    CAPILLARY BLOOD GLUCOSE      POCT Blood Glucose.: 162 mg/dL (17 Dec 2019 08:12)      GENERAL: Alert, NAD  CHEST/LUNG: Clear to auscultation bilaterally, respirations nonlabored  HEART: S1S2, Regular rate and rhythm;   ABDOMEN: + Bowel sounds, soft, Nontender, Nondistended  : Jaimes catheter draining clear urine, no suprapubic tenderness      LABS:                        8.2    6.91  )-----------( 132      ( 17 Dec 2019 07:32 )             27.1     12-17    142  |  111<H>  |  29<H>  ----------------------------<  157<H>  4.0   |  24  |  1.41<H>    Ca    8.9      17 Dec 2019 07:32  Phos  3.6     12-17  Mg     1.8     12-17      A/P  89 y/o Female PMH of HTN, DM2, HL, seizure d/o, admitted with AMS. She is clinically dehydrated with elevated creatinine, has hypercalcemia, likely metabolic/septic encephalopathy, with urinary retention, and UTI.    Continue VTE prophylaxis  - Continue medical management and supportive care  - Trend BUN/creatinine  - Continue Jaimes cath till renal funciton normalizes  - Will discuss with Dr. Pérez

## 2019-12-17 NOTE — DIETITIAN INITIAL EVALUATION ADULT. - ENERGY NEEDS
Height: 157.5 cm/5'2"  IBW:  49.8 kg         % IBW: 123%            UBW:              %UBW Height: 157.5 cm/5'2"  IBW:  49.8 kg         % IBW: 123%            UBW:  ?         %UBW: ?, wt. loss of 0.5 kg/0.8% noted since adm, may be related to fluid loss

## 2019-12-17 NOTE — SWALLOW BEDSIDE ASSESSMENT ADULT - PHARYNGEAL PHASE
Delayed pharyngeal swallow/Decreased laryngeal elevation Decreased laryngeal elevation/Delayed pharyngeal swallow Decreased laryngeal elevation change in respiration/Decreased laryngeal elevation/Delayed pharyngeal swallow

## 2019-12-17 NOTE — SWALLOW BEDSIDE ASSESSMENT ADULT - COMMENTS
CXR 12/16/2019 INTERPRETATION:  AP semierect chest on December 16, 2019 at 8:01 PM. Patient has fever.  Poor inspiration crowds the chest. Heart is likely enlarged.Slight process at the left lateral costophrenic angle again noted.  Chest is similar to December 10.IMPRESSION: Unchanged chest as above.

## 2019-12-17 NOTE — PROGRESS NOTE ADULT - PROBLEM SELECTOR PLAN 3
resolved likely related to UTI, hypercalcemia, dehydration, IV fluids, cultures in the presence of dementia and old cva   continue with antibx

## 2019-12-17 NOTE — PROGRESS NOTE ADULT - ASSESSMENT
HPI:  89 y/o Female PMH of HTN, DM2, HL, seizure d/o, gout presents to ED for eval of increasing weakness and change in mental status over the past few weeks.  Family states some days pt is at her baseline, then other times she is more lethargic, noted drooling.  Family states pt taken to outside hospital twice for sx, work up neg & dc home.  Family states pt has been compliant w her meds, no known grand mal seizures.  No fall/head trauma.  Pt currently denies pain, denies all complaints; she is poor historian, on memantine, possible dementia. (10 Dec 2019 06:42)  ---------------------------- As Above ---------------------------------------------------- Patient seen earlier today  Called to see patient regarding abnormal CT scan w/o oral contrast. Stomach appears lobulated. The patient has no symptoms to suggest any stomach pathology.  The patient ( daughter ) denies melena, hematochezia, hematemesis, nausea, vomiting, abdominal pain, diarrhea, or change in bowel movements. The patient occasionally gets constipated followed by diarrhea.   ASA (+) No family history of colon cancer. Never had a colonoscopy. No weight loss.    Abnormal tomach on CT scan w/o contrast. Reviewed with radiologist. Will arrange for EGD on Monday. Discussed with daughters for 10 minutes. Consent obtained.    [id number]  ---------   EGD revealed an essentially normal stomach. Biopsy results pending.  Patient passed speech evaluation. Dysphagia 2 with nectar thickened liquids

## 2019-12-17 NOTE — DIETITIAN INITIAL EVALUATION ADULT. - PERTINENT LABORATORY DATA
12-17 Na142 mmol/L Glu 157 mg/dL<H> K+ 4.0 mmol/L Cr  1.41 mg/dL<H> BUN 29 mg/dL<H> 12-17 Phos 3.6 mg/dL 12-12 Alb 2.7 g/dL<L> 12-11 [id number] 7.7 %<H>

## 2019-12-17 NOTE — SWALLOW BEDSIDE ASSESSMENT ADULT - SWALLOW EVAL: RECOMMENDED FEEDING/EATING TECHNIQUES
crush medication (when feasible)/maintain upright posture during/after eating for 30 mins/small sips/bites/oral hygiene

## 2019-12-17 NOTE — PROGRESS NOTE ADULT - SUBJECTIVE AND OBJECTIVE BOX
Patient is a 89y old  Female who presents with a chief complaint of Weakness. (10 Dec 2019 06:42)      OVERNIGHT EVENTS:  post egd chest congestion and fever    MEDICATIONS  (STANDING):  atorvastatin 40 milliGRAM(s) Oral at bedtime  dextrose 5%. 1000 milliLiter(s) (50 mL/Hr) IV Continuous <Continuous>  dextrose 50% Injectable 12.5 Gram(s) IV Push once  dextrose 50% Injectable 25 Gram(s) IV Push once  dextrose 50% Injectable 25 Gram(s) IV Push once  heparin  Injectable 5000 Unit(s) SubCutaneous every 12 hours  insulin lispro (HumaLOG) corrective regimen sliding scale   SubCutaneous three times a day before meals  levETIRAcetam  IVPB 500 milliGRAM(s) IV Intermittent every 12 hours  piperacillin/tazobactam IVPB.. 3.375 Gram(s) IV Intermittent every 8 hours    MEDICATIONS  (PRN):  acetaminophen  Suppository .. 650 milliGRAM(s) Rectal every 6 hours PRN Temp greater or equal to 38C (100.4F), Mild Pain (1 - 3)  albuterol/ipratropium for Nebulization. 3 milliLiter(s) Nebulizer every 6 hours PRN Shortness of Breath and/or Wheezing  dextrose 40% Gel 15 Gram(s) Oral once PRN Blood Glucose LESS THAN 70 milliGRAM(s)/deciliter  glucagon  Injectable 1 milliGRAM(s) IntraMuscular once PRN Glucose LESS THAN 70 milligrams/deciliter  Allergies    No Known Allergies    Intolerances        SUBJECTIVE: in bed in NAD, no acute events overnight   Vital Signs Last 24 Hrs  T(C): 37.3 (17 Dec 2019 05:35), Max: 38.6 (16 Dec 2019 19:41)  T(F): 99.1 (17 Dec 2019 05:35), Max: 101.4 (16 Dec 2019 19:41)  HR: 92 (17 Dec 2019 05:35) (84 - 136)  BP: 125/66 (17 Dec 2019 05:35) (109/62 - 154/92)  BP(mean): --  RR: 16 (17 Dec 2019 05:35) (16 - 28)  SpO2: 98% (17 Dec 2019 05:35) (96% - 100%)    PHYSICAL EXAM:    GENERAL: NAD, well-groomed, well-developed  HEAD:  Atraumatic, Normocephalic  EYES: EOMI, PERRLA, conjunctiva and sclera clear  ENMT: No tonsillar erythema, exudates, or enlargement; Moist mucous membranes, Good dentition, No lesions  NECK: Supple, No JVD, Normal thyroid  CHEST/LUNG:  improved air entry , mild decreased bases,. no wheezing or rhonchi    HEART: Regular rate and rhythm; No murmurs, rubs, or gallops  ABDOMEN: Soft, Nontender, Nondistended; Bowel sounds present  EXTREMITIES:  2+ Peripheral Pulses, No clubbing, cyanosis, or edema BL LE    SKIN: No rashes or lesions  NERVOUS SYSTEM:  Alert & Oriented X1 demented follows commands ;   DTRs 2+ intact and symmetric, sensation intact BL    LABS:                                     8.2    6.91  )-----------( 132      ( 17 Dec 2019 07:32 )             27.1   12-17    142  |  111<H>  |  29<H>  ----------------------------<  157<H>  4.0   |  24  |  1.41<H>    Ca    8.9      17 Dec 2019 07:32  Phos  3.6     12-17  Mg     1.8     12-17          Vitamin D, 1, 25-Dihydroxy (12.10.19 @ 14:48)    Vitamin D, 1, 25-Dihydroxy: 69.9 pg/mL    Parathyroid Hormone Intact, Serum (12.10.19 @ 12:39)    Intact PTH: 4: PTH METHOD: Roche  Guide for Interpretation of PTH and Calcium Results                           Calcium             PTH                           MG/DL               PG/ML  Normal                   8.4-10.5            15-65  Primary  Hyperparathyroidism      >10.5               >50  Non-PTH Hypercalcemia    >10.5               0-20  Hypoparathyroidism       <8.4                0-20  Pseudohypoparathyroid    <8.4                >50  This is intended as a guide only. Factors such as sunlight exposure,  Vitamin D status and renal function should be evaluated along with  clinical presentation. pg/mL      Cultures;   Urinalysis Basic - ( 11 Dec 2019 13:27 )    Color: Yellow / Appearance: very cloudy / S.015 / pH: x  Gluc: x / Ketone: Negative  / Bili: Negative / Urobili: Negative mg/dL   Blood: x / Protein: 100 mg/dL / Nitrite: Negative   Leuk Esterase: Moderate / RBC: 11-25 /HPF / WBC TNTC /HPF   Sq Epi: x / Non Sq Epi: Few / Bacteria: Few      Culture - Urine (collected 10 Dec 2019 09:28)  Source: .Urine Clean Catch (Midstream)  Final Report (11 Dec 2019 10:01):    >=3 organisms. Probable collection contamination.            Calcium, Ionized in AM (19 @ 09:18)    Calcium, Ionized: 1.59 mmoL/L        CAPILLARY BLOOD GLUCOSE      POCT Blood Glucose.: 191 mg/dL (15 Dec 2019 10:36)  POCT Blood Glucose.: 145 mg/dL (15 Dec 2019 07:21)  POCT Blood Glucose.: 173 mg/dL (14 Dec 2019 21:01)  POCT Blood Glucose.: 178 mg/dL (14 Dec 2019 16:21)    Lipid panel:     CARDIAC MARKERS ( 09 Dec 2019 21:02 )  <.015 ng/mL / x     / x     / x     / x            RADIOLOGY & ADDITIONAL TESTS:    < from: TTE Echo Doppler w/o Cont (19 @ 16:06) >  Summary:   1. Left ventricular ejection fraction, by visual estimation, is 45 to   50%.   2. Normal left ventricular internal cavity size.   3. Spectral Doppler shows impaired relaxation pattern of left   ventricular myocardial filling (Grade I diastolic dysfunction).   4. Normal right ventricular size and function.   5. The left atrium is normal in size.   6. The right atrium is normal in size.   7. There is no evidence of pericardial effusion.   8. Mild mitral annular calcification.   9. Mild to moderate mitral valve regurgitation.  10. Thickening and calcification of the posterior mitral valve leaflet.  11. Moderate tricuspid regurgitation.  12. Structurally normal tricuspid valve, with normal leaflet excursion.  13. Normal trileaflet aortic valve with normal opening.  14. Mild to moderate pulmonic valve regurgitation.  15. Structurally normal pulmonic valve, with normal leaflet excursion.  16. Estimated pulmonary artery systolic pressure is 55.1 mmHg assuming a   right atrial pressure of 10 mmHg, which is consistent with moderate   pulmonary hypertension.  17. Mitral valve mean gradient is 2.7 mmHg consistent with mild mitral   stenosis.      < end of copied text >    Imaging Personally Reviewed:  [ x] YES      Consultant(s) Notes Reviewed:  [x ] YES     Care Discussed with [x ] Consultants [X ] Patient [x ] Family  [x ]    [x ]  Other; RN

## 2019-12-17 NOTE — SWALLOW BEDSIDE ASSESSMENT ADULT - H & P REVIEW
yes/89 y/o Female PMH of HTN, DM2, HL, seizure d/o, gout presents to ED for eval of increasing weakness and change in mental status over the past few weeks.  Family states some days pt is at her baseline, then other times she is more lethargic, noted drooling.  Family states pt taken to outside hospital twice for sx, work up neg & dc home.  Family states pt has been compliant w her meds, no known grand mal seizures.  No fall/head trauma.  Pt currently denies pain, denies all complaints; she is poor historian, on memantine, possible dementia.

## 2019-12-17 NOTE — SWALLOW BEDSIDE ASSESSMENT ADULT - SLP PERTINENT HISTORY OF CURRENT PROBLEM
RN reported pt aspirated with congestion yesterday after EGD.  SLP appreciated GI note Mild antral gastritis  s/p biopsy

## 2019-12-17 NOTE — SWALLOW BEDSIDE ASSESSMENT ADULT - SWALLOW EVAL: DIAGNOSIS
pt presented with oropharyngeal phases of swallow marked by decreased mastication/bolus manipulation for solid, suspect reduced oral control of thin liquid with premature spill and delay in swallow trigger with decreased laryngeal elevation. noted change in respiration with thin liquid

## 2019-12-17 NOTE — DIETITIAN INITIAL EVALUATION ADULT. - PHYSICAL APPEARANCE
other (specify)/BMI=24.9( 12/10/19), 12/10, 1+ edema of ankles & feet noted, 12/15, 1+ edema of left arm & hand noted Nutrition focused physical exam conducted; Subcutaneous fat Exam;  [ Mild   ]  Orbital fat pads region,  [ Unable   ]Buccal fat region,  [ Mild   ]triceps region, [ WNL  ]ribs region.  Muscle Exam; [ Mild   ]temples region, [ WNL  ]clavicle region, [ WNL  ]shoulder region, [ Unable   ]Scapula region, [ WNL   ]Interosseous region, [ WNL  ]thigh region, [ WNL   ]Calf region, blisters & bandage on left hand noted

## 2019-12-17 NOTE — CONSULT NOTE ADULT - ASSESSMENT
pt with resolving UTI agree with Rocephin switch to vantin in am x 4 days if cont to improve  candidiasis   add diflucan x 3 days   miconazole suppository as utpt 3 y/o Female PMH of HTN, DM2, HL, seizure d/o, gout presents to ED for eval of increasing weakness and change in mental status over the past few weeks.   Pt was intially admitted with a probable UTI ,Acute urinary retention and MARGARET and de jesus was placed and was started on IV Rocephin ,Urine c/s later grew enterococcus . AS leukocytosis was already resolving  the antibiotics was not changed .Pt also was getting azithro for a possible pneumonia   CT abdo abd Chest was performed . She was found to have a suspicious lesion on stomach and underwent EGD yesterday .pt is having fever since yesterday as well .New blood and urine c/s has been sent .She is awake but denies any c/o . antibiotics have been switched to Zosyn now . Now seen with   1.Fever : untreated UTI .pt was admitted with Acute urinary retention/neurogenic bladder and has a de jesus .Urine c/.s grew Enterococcus which is not covered with Rocephin   agree with starting zosyn (day 2 )   will follow up on new urine and blood c/s   Urology on board    2.S/p EGD : doubt if fever is post procedural 3 y/o Female PMH of HTN, DM2, HL, seizure d/o, gout presents to ED for eval of increasing weakness and change in mental status over the past few weeks.   Pt was intially admitted with a probable UTI ,Acute urinary retention and MARGARET and de jesus was placed and was started on IV Rocephin ,Urine c/s later grew enterococcus . AS leukocytosis was already resolving  the antibiotics was not changed .Pt also was getting azithro for a possible pneumonia   CT abdo abd Chest was performed . She was found to have a suspicious lesion on stomach and underwent EGD yesterday .pt is having fever since yesterday as well .New blood and urine c/s has been sent .She is awake but denies any c/o . antibiotics have been switched to Zosyn now . Now seen with   1.Fever : untreated UTI .pt was admitted with Acute urinary retention/neurogenic bladder and has a de jesus .Urine c/.s grew Enterococcus which is not covered with Rocephin   agree with starting zosyn (day 2 )   will follow up on new urine and blood c/s   Urology on board    2.S/p EGD : doubt if fever is post procedural     3.MARGARET : resolved

## 2019-12-18 DIAGNOSIS — Z29.9 ENCOUNTER FOR PROPHYLACTIC MEASURES, UNSPECIFIED: ICD-10-CM

## 2019-12-18 LAB
ANION GAP SERPL CALC-SCNC: 8 MMOL/L — SIGNIFICANT CHANGE UP (ref 5–17)
BUN SERPL-MCNC: 25 MG/DL — HIGH (ref 7–23)
CALCIUM SERPL-MCNC: 8.9 MG/DL — SIGNIFICANT CHANGE UP (ref 8.5–10.1)
CHLORIDE SERPL-SCNC: 110 MMOL/L — HIGH (ref 96–108)
CO2 SERPL-SCNC: 24 MMOL/L — SIGNIFICANT CHANGE UP (ref 22–31)
CREAT SERPL-MCNC: 1.24 MG/DL — SIGNIFICANT CHANGE UP (ref 0.5–1.3)
CULTURE RESULTS: NO GROWTH — SIGNIFICANT CHANGE UP
GLUCOSE BLDC GLUCOMTR-MCNC: 145 MG/DL — HIGH (ref 70–99)
GLUCOSE BLDC GLUCOMTR-MCNC: 203 MG/DL — HIGH (ref 70–99)
GLUCOSE SERPL-MCNC: 145 MG/DL — HIGH (ref 70–99)
HCT VFR BLD CALC: 27.3 % — LOW (ref 34.5–45)
HGB BLD-MCNC: 8.4 G/DL — LOW (ref 11.5–15.5)
LACTATE SERPL-SCNC: 1 MMOL/L — SIGNIFICANT CHANGE UP (ref 0.7–2)
MAGNESIUM SERPL-MCNC: 2 MG/DL — SIGNIFICANT CHANGE UP (ref 1.6–2.6)
MCHC RBC-ENTMCNC: 24.5 PG — LOW (ref 27–34)
MCHC RBC-ENTMCNC: 30.8 GM/DL — LOW (ref 32–36)
MCV RBC AUTO: 79.6 FL — LOW (ref 80–100)
NRBC # BLD: 0 /100 WBCS — SIGNIFICANT CHANGE UP (ref 0–0)
PHOSPHATE SERPL-MCNC: 2.8 MG/DL — SIGNIFICANT CHANGE UP (ref 2.5–4.5)
PLATELET # BLD AUTO: 152 K/UL — SIGNIFICANT CHANGE UP (ref 150–400)
POTASSIUM SERPL-MCNC: 3.6 MMOL/L — SIGNIFICANT CHANGE UP (ref 3.5–5.3)
POTASSIUM SERPL-SCNC: 3.6 MMOL/L — SIGNIFICANT CHANGE UP (ref 3.5–5.3)
RBC # BLD: 3.43 M/UL — LOW (ref 3.8–5.2)
RBC # FLD: 16.6 % — HIGH (ref 10.3–14.5)
SODIUM SERPL-SCNC: 142 MMOL/L — SIGNIFICANT CHANGE UP (ref 135–145)
SPECIMEN SOURCE: SIGNIFICANT CHANGE UP
SURGICAL PATHOLOGY STUDY: SIGNIFICANT CHANGE UP
WBC # BLD: 8.75 K/UL — SIGNIFICANT CHANGE UP (ref 3.8–10.5)
WBC # FLD AUTO: 8.75 K/UL — SIGNIFICANT CHANGE UP (ref 3.8–10.5)

## 2019-12-18 PROCEDURE — 99232 SBSQ HOSP IP/OBS MODERATE 35: CPT

## 2019-12-18 PROCEDURE — 99233 SBSQ HOSP IP/OBS HIGH 50: CPT

## 2019-12-18 RX ORDER — INSULIN GLARGINE 100 [IU]/ML
10 INJECTION, SOLUTION SUBCUTANEOUS AT BEDTIME
Refills: 0 | Status: DISCONTINUED | OUTPATIENT
Start: 2019-12-18 | End: 2019-12-21

## 2019-12-18 RX ADMIN — Medication 2: at 17:17

## 2019-12-18 RX ADMIN — HEPARIN SODIUM 5000 UNIT(S): 5000 INJECTION INTRAVENOUS; SUBCUTANEOUS at 05:34

## 2019-12-18 RX ADMIN — PIPERACILLIN AND TAZOBACTAM 25 GRAM(S): 4; .5 INJECTION, POWDER, LYOPHILIZED, FOR SOLUTION INTRAVENOUS at 09:33

## 2019-12-18 RX ADMIN — INSULIN GLARGINE 10 UNIT(S): 100 INJECTION, SOLUTION SUBCUTANEOUS at 22:34

## 2019-12-18 RX ADMIN — LEVETIRACETAM 420 MILLIGRAM(S): 250 TABLET, FILM COATED ORAL at 17:23

## 2019-12-18 RX ADMIN — PIPERACILLIN AND TAZOBACTAM 25 GRAM(S): 4; .5 INJECTION, POWDER, LYOPHILIZED, FOR SOLUTION INTRAVENOUS at 17:23

## 2019-12-18 RX ADMIN — LEVETIRACETAM 420 MILLIGRAM(S): 250 TABLET, FILM COATED ORAL at 05:33

## 2019-12-18 RX ADMIN — HEPARIN SODIUM 5000 UNIT(S): 5000 INJECTION INTRAVENOUS; SUBCUTANEOUS at 17:23

## 2019-12-18 RX ADMIN — Medication 2: at 12:50

## 2019-12-18 RX ADMIN — ATORVASTATIN CALCIUM 40 MILLIGRAM(S): 80 TABLET, FILM COATED ORAL at 22:34

## 2019-12-18 NOTE — PROGRESS NOTE ADULT - PROBLEM SELECTOR PLAN 2
fever etiology unclear   continue with zosyn  follow fever work-up    (completed antibx for UTI and possible PNA on 12/16/19)

## 2019-12-18 NOTE — PROGRESS NOTE ADULT - SUBJECTIVE AND OBJECTIVE BOX
Patient seen and examined at bedside in no distress.  No complaints offered.    T(F): 98.8 (12-18-19 @ 04:24), Max: 100.1 (12-18-19 @ 00:01)  HR: 107 (12-18-19 @ 04:24) (87 - 107)  BP: 141/98 (12-18-19 @ 04:24) (122/67 - 141/98)  RR: 16 (12-18-19 @ 04:24) (16 - 17)  SpO2: 100% (12-18-19 @ 04:24) (96% - 100%)    PHYSICAL EXAM:  General: Awake, alert, NAD  CV: +S1S2 regular rate and rhythm  Lung: Respirations nonlabored  Abdomen: Soft  : De Jesus catheter in place draining clear, yellow urine, output: 900cc/24hrs    LABS:                        8.4    8.75  )-----------( 152      ( 18 Dec 2019 08:06 )             27.3     12-18    142  |  110<H>  |  25<H>  ----------------------------<  145<H>  3.6   |  24  |  1.24    Ca    8.9      18 Dec 2019 08:06    Impression: 89F with UR possibly due to neurogenic bladder s/p failed TOV yesterday  Plan:  - continue de jesus catheter, monitor UOP  - continue medical management and supportive care Patient seen and examined at bedside in no distress.  No complaints offered.    T(F): 98.8 (12-18-19 @ 04:24), Max: 100.1 (12-18-19 @ 00:01)  HR: 107 (12-18-19 @ 04:24) (87 - 107)  BP: 141/98 (12-18-19 @ 04:24) (122/67 - 141/98)  RR: 16 (12-18-19 @ 04:24) (16 - 17)  SpO2: 100% (12-18-19 @ 04:24) (96% - 100%)    PHYSICAL EXAM:  General: Awake, alert, NAD  CV: +S1S2 regular rate and rhythm  Lung: Respirations nonlabored  Abdomen: Soft  : Jaimes catheter in place draining clear, yellow urine, output: 900cc/24hrs    LABS:                        8.4    8.75  )-----------( 152      ( 18 Dec 2019 08:06 )             27.3     12-18    142  |  110<H>  |  25<H>  ----------------------------<  145<H>  3.6   |  24  |  1.24    Ca    8.9      18 Dec 2019 08:06    Impression: 89F with UR possibly due to neurogenic bladder   Plan:  - TOV today  - continue medical management and supportive care

## 2019-12-18 NOTE — PROGRESS NOTE ADULT - ASSESSMENT
89 y/o Female PMH of HTN, DM2, HL, seizure d/o, gout presents to ED for eval of increasing weakness and change in mental status over the past few weeks.  Family states some days pt is at her baseline, then other times she is more lethargic, noted drooling.  Family states pt taken to outside hospital twice for sx, work up neg & dc home.  Family states pt has been compliant w her meds, no known grand mal seizures.  No fall/head trauma.  Pt currently denies pain, denies all complaints; she is poor historian, on memantine, possible dementia. She is clinically dehydrated with elevated creatinine, has hypercalcemia, likely metabolic/septic encephalopathy.    Pt was initially admitted with a probable UTI ,Acute urinary retention and MARGARET and de jesus was placed and was started on IV Rocephin ,Urine c/s later grew enterococcus . AS leukocytosis was already resolving  the antibiotics was not changed .Pt also was getting azithro for a possible pneumonia   CT abdo abd Chest was performed . She was found to have a suspicious lesion on stomach and underwent EGD yesterday .pt is having fever since yesterday as well .New blood and urine c/s has been sent .She is awake but denies any c/o . antibiotics have been switched to Zosyn now      #DM2, A1c 7.7%  continue with ISS, added lantus 10u at bedtime   FS goal 140-180  carb consistent diet

## 2019-12-18 NOTE — PROGRESS NOTE ADULT - SUBJECTIVE AND OBJECTIVE BOX
Patient is a 89y old  Female who presents with a chief complaint of Weakness. (10 Dec 2019 06:42)      Patient seen and examined bedside.   No overnight events,   No complaints.     MEDICATIONS  (STANDING):  atorvastatin 40 milliGRAM(s) Oral at bedtime  dextrose 5%. 1000 milliLiter(s) (50 mL/Hr) IV Continuous <Continuous>  dextrose 50% Injectable 12.5 Gram(s) IV Push once  dextrose 50% Injectable 25 Gram(s) IV Push once  dextrose 50% Injectable 25 Gram(s) IV Push once  heparin  Injectable 5000 Unit(s) SubCutaneous every 12 hours  insulin lispro (HumaLOG) corrective regimen sliding scale   SubCutaneous three times a day before meals  levETIRAcetam  IVPB 500 milliGRAM(s) IV Intermittent every 12 hours  piperacillin/tazobactam IVPB.. 3.375 Gram(s) IV Intermittent every 8 hours    MEDICATIONS  (PRN):  acetaminophen  Suppository .. 650 milliGRAM(s) Rectal every 6 hours PRN Temp greater or equal to 38C (100.4F), Mild Pain (1 - 3)  albuterol/ipratropium for Nebulization. 3 milliLiter(s) Nebulizer every 6 hours PRN Shortness of Breath and/or Wheezing  dextrose 40% Gel 15 Gram(s) Oral once PRN Blood Glucose LESS THAN 70 milliGRAM(s)/deciliter  glucagon  Injectable 1 milliGRAM(s) IntraMuscular once PRN Glucose LESS THAN 70 milligrams/deciliter  Allergies    No Known Allergies    Intolerances        Vital Signs Last 24 Hrs  T(C): 36.6 (18 Dec 2019 17:41), Max: 37.8 (18 Dec 2019 00:01)  T(F): 97.9 (18 Dec 2019 17:41), Max: 100.1 (18 Dec 2019 00:01)  HR: 91 (18 Dec 2019 17:41) (91 - 108)  BP: 120/57 (18 Dec 2019 17:41) (120/57 - 147/77)  BP(mean): --  RR: 17 (18 Dec 2019 17:41) (16 - 17)  SpO2: 99% (18 Dec 2019 17:41) (98% - 100%)    PHYSICAL EXAM:    GENERAL: NAD  HEAD:  Atraumatic, Normocephalic  EYES: EOMI, PERRLA, conjunctiva and sclera clear  ENMT: No tonsillar erythema, exudates, or enlargement; Moist mucous membranes  NECK: Supple, No JVD  CHEST/LUNG:  improved air entry , mild decreased bases,. no wheezing or rhonchi    HEART: Regular rate and rhythm; No murmurs, rubs, or gallops  ABDOMEN: Soft, Nontender, Nondistended; Bowel sounds present  EXTREMITIES:  2+ Peripheral Pulses b/l, No clubbing, cyanosis, or edema BL LE  NERVOUS SYSTEM:  Alert & Oriented X1 demented follows commands, moving all extremities     Labs and imaging reviewed.                                                 8.4    8.75  )-----------( 152      ( 18 Dec 2019 08:06 )             27.3   12-18    142  |  110<H>  |  25<H>  ----------------------------<  145<H>  3.6   |  24  |  1.24    Ca    8.9      18 Dec 2019 08:06  Phos  2.8     12-18  Mg     2.0     12-18          Vitamin D, 1, 25-Dihydroxy (12.10.19 @ 14:48)    Vitamin D, 1, 25-Dihydroxy: 69.9 pg/mL    Parathyroid Hormone Intact, Serum (12.10.19 @ 12:39)    Intact PTH: 4: PTH METHOD: Roche  Guide for Interpretation of PTH and Calcium Results                           Calcium             PTH                           MG/DL               PG/ML  Normal                   8.4-10.5            15-65  Primary  Hyperparathyroidism      >10.5               >50  Non-PTH Hypercalcemia    >10.5               0-20  Hypoparathyroidism       <8.4                0-20  Pseudohypoparathyroid    <8.4                >50  This is intended as a guide only. Factors such as sunlight exposure,  Vitamin D status and renal function should be evaluated along with  clinical presentation. pg/mL      Cultures;   Urinalysis Basic - ( 11 Dec 2019 13:27 )    Color: Yellow / Appearance: very cloudy / S.015 / pH: x  Gluc: x / Ketone: Negative  / Bili: Negative / Urobili: Negative mg/dL   Blood: x / Protein: 100 mg/dL / Nitrite: Negative   Leuk Esterase: Moderate / RBC: 11-25 /HPF / WBC TNTC /HPF   Sq Epi: x / Non Sq Epi: Few / Bacteria: Few      Culture - Urine (collected 10 Dec 2019 09:28)  Source: .Urine Clean Catch (Midstream)  Final Report (11 Dec 2019 10:01):    >=3 organisms. Probable collection contamination.            Calcium, Ionized in AM (19 @ 09:18)    Calcium, Ionized: 1.59 mmoL/L      Lipid panel:     CARDIAC MARKERS ( 09 Dec 2019 21:02 )  <.015 ng/mL / x     / x     / x     / x            RADIOLOGY & ADDITIONAL TESTS:    < from: TTE Echo Doppler w/o Cont (19 @ 16:06) >  Summary:   1. Left ventricular ejection fraction, by visual estimation, is 45 to   50%.   2. Normal left ventricular internal cavity size.   3. Spectral Doppler shows impaired relaxation pattern of left   ventricular myocardial filling (Grade I diastolic dysfunction).   4. Normal right ventricular size and function.   5. The left atrium is normal in size.   6. The right atrium is normal in size.   7. There is no evidence of pericardial effusion.   8. Mild mitral annular calcification.   9. Mild to moderate mitral valve regurgitation.  10. Thickening and calcification of the posterior mitral valve leaflet.  11. Moderate tricuspid regurgitation.  12. Structurally normal tricuspid valve, with normal leaflet excursion.  13. Normal trileaflet aortic valve with normal opening.  14. Mild to moderate pulmonic valve regurgitation.  15. Structurally normal pulmonic valve, with normal leaflet excursion.  16. Estimated pulmonary artery systolic pressure is 55.1 mmHg assuming a   right atrial pressure of 10 mmHg, which is consistent with moderate   pulmonary hypertension.  17. Mitral valve mean gradient is 2.7 mmHg consistent with mild mitral   stenosis.      < end of copied text >    Imaging Personally Reviewed:  [ x] YES      Consultant(s) Notes Reviewed:  [x ] YES     Care Discussed with [x ] Consultants [X ] Patient [x ] Family  [x ]    [x ]  Other; RN

## 2019-12-18 NOTE — PROGRESS NOTE ADULT - PROBLEM SELECTOR PLAN 1
associated with anemia unspecified possible lymphoproliferative disorder   PTH low ,   PTHRP wnl  vitamin d level wnl ( low normal)  ruled out hyperthyroid check tsh wnl  free t4 wnl   spep and upep noted: no m spike ,  no bence israel protein    endocrine consult reviewed and appreciated s/p Aredia on 12/11/19 12/13/19 obtained ct chest abd pelvis ? lobular stomach therefore consulted gi s/p  egd on Monday 12/16/19 c/w gastritis no mass associated with anemia unspecified possible lymphoproliferative disorder   PTH low ,   PTHRP wnl  vitamin d level wnl ( low normal)  ruled out hyperthyroid check tsh wnl  free t4 wnl   spep and upep noted: no m spike ,  no bence israel protein    endocrine consult reviewed and appreciated s/p Aredia on 12/11/19 12/13/19 obtained ct chest abd pelvis ? lobular stomach therefore consulted gi s/p  egd on Monday 12/16/19 c/w gastritis no mass    BM Bx jenny for Friday 12/20/19

## 2019-12-18 NOTE — PROGRESS NOTE ADULT - SUBJECTIVE AND OBJECTIVE BOX
Patient is a 89y old  Female who presents with a chief complaint of Weakness. (18 Dec 2019 15:34)      Interval History: serum Calcium levels normal   status post Aredia   Humoral Hypercalcemia of malignancy      MEDICATIONS  (STANDING):  atorvastatin 40 milliGRAM(s) Oral at bedtime  dextrose 5%. 1000 milliLiter(s) (50 mL/Hr) IV Continuous <Continuous>  dextrose 50% Injectable 12.5 Gram(s) IV Push once  dextrose 50% Injectable 25 Gram(s) IV Push once  dextrose 50% Injectable 25 Gram(s) IV Push once  heparin  Injectable 5000 Unit(s) SubCutaneous every 12 hours  insulin lispro (HumaLOG) corrective regimen sliding scale   SubCutaneous three times a day before meals  levETIRAcetam  IVPB 500 milliGRAM(s) IV Intermittent every 12 hours  piperacillin/tazobactam IVPB.. 3.375 Gram(s) IV Intermittent every 8 hours    MEDICATIONS  (PRN):  acetaminophen  Suppository .. 650 milliGRAM(s) Rectal every 6 hours PRN Temp greater or equal to 38C (100.4F), Mild Pain (1 - 3)  albuterol/ipratropium for Nebulization. 3 milliLiter(s) Nebulizer every 6 hours PRN Shortness of Breath and/or Wheezing  dextrose 40% Gel 15 Gram(s) Oral once PRN Blood Glucose LESS THAN 70 milliGRAM(s)/deciliter  glucagon  Injectable 1 milliGRAM(s) IntraMuscular once PRN Glucose LESS THAN 70 milligrams/deciliter      Allergies    No Known Allergies    Intolerances        REVIEW OF SYSTEMS:  CONSTITUTIONAL: no changes    Vital Signs Last 24 Hrs  T(C): 36.6 (18 Dec 2019 17:41), Max: 37.8 (18 Dec 2019 00:01)  T(F): 97.9 (18 Dec 2019 17:41), Max: 100.1 (18 Dec 2019 00:01)  HR: 91 (18 Dec 2019 17:41) (91 - 108)  BP: 120/57 (18 Dec 2019 17:41) (120/57 - 147/77)  BP(mean): --  RR: 17 (18 Dec 2019 17:41) (16 - 17)  SpO2: 99% (18 Dec 2019 17:41) (98% - 100%)    PHYSICAL EXAM:  GENERAL:   deferred     LABS:        CAPILLARY BLOOD GLUCOSE      POCT Blood Glucose.: 229 mg/dL (18 Dec 2019 16:35)  POCT Blood Glucose.: 203 mg/dL (18 Dec 2019 12:16)  POCT Blood Glucose.: 145 mg/dL (18 Dec 2019 08:16)  POCT Blood Glucose.: 193 mg/dL (17 Dec 2019 22:52)    Lipid panel:           Thyroid:  Diabetes Tests:  Parathyroid Panel:  Adrenals:  RADIOLOGY & ADDITIONAL TESTS:    Imaging Personally Reviewed:  [ ] YES  [ ] NO    Consultant(s) Notes Reviewed:  [ ] YES  [ ] NO    Care Discussed with Consultants/Other Providers [ ] YES  [ ] NO

## 2019-12-18 NOTE — PROGRESS NOTE ADULT - ASSESSMENT
89 y/o Female PMH of HTN, DM2, HL, seizure d/o, gout presents to ED for eval of increasing weakness and change in mental status over the past few weeks.   Pt was intially admitted with a probable UTI ,Acute urinary retention and MARGARET and de jesus was placed and was started on IV Rocephin ,Urine c/s later grew enterococcus . AS leukocytosis was already resolving  the antibiotics was not changed .Pt also was getting azithro for a possible pneumonia   CT abdo abd Chest was performed . She was found to have a suspicious lesion on stomach and underwent EGD yesterday .pt is having fever since yesterday as well .New blood and urine c/s has been sent .She is awake but denies any c/o . antibiotics have been switched to Zosyn now . Now seen with   1.Fever :resolving ,possible sec to  untreated UTI . pt was admitted with Acute urinary retention/neurogenic bladder and has a de jesus . Urine c/.s grew Enterococcus which is not covered with Rocephin  ? new gram neg Pneumonia  cont  zosyn (day 3 )   will follow up on new urine and blood c/s ( blood c/s so far neg)   Urology on board    2.S/p EGD : doubt if fever is post procedural     3.MARGARET : resolved

## 2019-12-18 NOTE — PROGRESS NOTE ADULT - SUBJECTIVE AND OBJECTIVE BOX
Patient is a 89y old  Female who presents with a chief complaint of Weakness. (18 Dec 2019 08:39)      INTERVAL HPI / OVERNIGHT EVENTS: doing ok ,says feels better    MEDICATIONS  (STANDING):  atorvastatin 40 milliGRAM(s) Oral at bedtime  dextrose 5%. 1000 milliLiter(s) (50 mL/Hr) IV Continuous <Continuous>  dextrose 50% Injectable 12.5 Gram(s) IV Push once  dextrose 50% Injectable 25 Gram(s) IV Push once  dextrose 50% Injectable 25 Gram(s) IV Push once  heparin  Injectable 5000 Unit(s) SubCutaneous every 12 hours  insulin lispro (HumaLOG) corrective regimen sliding scale   SubCutaneous three times a day before meals  levETIRAcetam  IVPB 500 milliGRAM(s) IV Intermittent every 12 hours  piperacillin/tazobactam IVPB.. 3.375 Gram(s) IV Intermittent every 8 hours    MEDICATIONS  (PRN):  acetaminophen  Suppository .. 650 milliGRAM(s) Rectal every 6 hours PRN Temp greater or equal to 38C (100.4F), Mild Pain (1 - 3)  albuterol/ipratropium for Nebulization. 3 milliLiter(s) Nebulizer every 6 hours PRN Shortness of Breath and/or Wheezing  dextrose 40% Gel 15 Gram(s) Oral once PRN Blood Glucose LESS THAN 70 milliGRAM(s)/deciliter  glucagon  Injectable 1 milliGRAM(s) IntraMuscular once PRN Glucose LESS THAN 70 milligrams/deciliter      Vital Signs Last 24 Hrs  T(C): 37.6 (18 Dec 2019 12:10), Max: 37.8 (17 Dec 2019 17:45)  T(F): 99.7 (18 Dec 2019 12:10), Max: 100.1 (18 Dec 2019 00:01)  HR: 101 (18 Dec 2019 15:25) (94 - 108)  BP: 147/77 (18 Dec 2019 15:25) (126/66 - 147/77)  BP(mean): --  RR: 16 (18 Dec 2019 12:10) (16 - 17)  SpO2: 98% (18 Dec 2019 15:25) (97% - 100%)    Review of systems:  General : no fever /chills, fatigue  CVS : no chest pain, palpitations  Lungs : no shortness of breath, cough  GI : no abdominal pain, vomiting, diarrhea   : no dysuria, hematuria        PHYSICAL EXAM:  General :NAD  Constitutional:  well-groomed, well-developed  Respiratory: CTAB/L  Cardiovascular: S1 and S2, RRR, no M/G/R  Gastrointestinal: BS+, soft, NT/ND  Extremities: No peripheral edema  Vascular: 2+ peripheral pulses  Skin: No rashes      LABS:                        8.4    8.75  )-----------( 152      ( 18 Dec 2019 08:06 )             27.3     12-18    142  |  110<H>  |  25<H>  ----------------------------<  145<H>  3.6   |  24  |  1.24    Ca    8.9      18 Dec 2019 08:06  Phos  2.8     12-18  Mg     2.0     12-18            MICROBIOLOGY:  RECENT CULTURES:  12-17 .Blood Blood XXXX XXXX   No growth to date.    12-11 .Urine Catheterized Enterococcus faecalis XXXX   Enterococcus faecalis          RADIOLOGY & ADDITIONAL STUDIES:

## 2019-12-18 NOTE — PROGRESS NOTE ADULT - SUBJECTIVE AND OBJECTIVE BOX
INTERVAL History:  Weak  Anemia    Allergies    No Known Allergies    Intolerances        MEDICATIONS  (STANDING):  atorvastatin 40 milliGRAM(s) Oral at bedtime  dextrose 5%. 1000 milliLiter(s) (50 mL/Hr) IV Continuous <Continuous>  dextrose 50% Injectable 12.5 Gram(s) IV Push once  dextrose 50% Injectable 25 Gram(s) IV Push once  dextrose 50% Injectable 25 Gram(s) IV Push once  heparin  Injectable 5000 Unit(s) SubCutaneous every 12 hours  insulin lispro (HumaLOG) corrective regimen sliding scale   SubCutaneous three times a day before meals  levETIRAcetam  IVPB 500 milliGRAM(s) IV Intermittent every 12 hours  piperacillin/tazobactam IVPB.. 3.375 Gram(s) IV Intermittent every 8 hours    MEDICATIONS  (PRN):  acetaminophen  Suppository .. 650 milliGRAM(s) Rectal every 6 hours PRN Temp greater or equal to 38C (100.4F), Mild Pain (1 - 3)  albuterol/ipratropium for Nebulization. 3 milliLiter(s) Nebulizer every 6 hours PRN Shortness of Breath and/or Wheezing  dextrose 40% Gel 15 Gram(s) Oral once PRN Blood Glucose LESS THAN 70 milliGRAM(s)/deciliter  glucagon  Injectable 1 milliGRAM(s) IntraMuscular once PRN Glucose LESS THAN 70 milligrams/deciliter      Vital Signs Last 24 Hrs  T(C): 37.1 (18 Dec 2019 04:24), Max: 37.8 (17 Dec 2019 17:45)  T(F): 98.8 (18 Dec 2019 04:24), Max: 100.1 (18 Dec 2019 00:01)  HR: 107 (18 Dec 2019 04:24) (87 - 107)  BP: 141/98 (18 Dec 2019 04:24) (122/67 - 141/98)  BP(mean): --  RR: 16 (18 Dec 2019 04:24) (16 - 17)  SpO2: 100% (18 Dec 2019 04:24) (96% - 100%)    PHYSICAL EXAM:      EYES: EOMI, PERRLA, conjunctiva and sclera clear  NECK: Supple, No JVD, Normal thyroid  CHEST/LUNG: bilaterally;  rales, rhonchi,     ABDOMEN: Soft, Nontender.  LYMPH: No lymphadenopathy noted        LABS:                        8.4    8.75  )-----------( 152      ( 18 Dec 2019 08:06 )             27.3     12-18    142  |  110<H>  |  25<H>  ----------------------------<  145<H>  3.6   |  24  |  1.24    Ca    8.9      18 Dec 2019 08:06  Phos  3.6     12-17  Mg     1.8     12-17              RADIOLOGY & ADDITIONAL STUDIES:    PATHOLOGY:

## 2019-12-19 DIAGNOSIS — J18.9 PNEUMONIA, UNSPECIFIED ORGANISM: ICD-10-CM

## 2019-12-19 LAB
24R-OH-CALCIDIOL SERPL-MCNC: 22.7 NG/ML — LOW (ref 30–80)
ANION GAP SERPL CALC-SCNC: 9 MMOL/L — SIGNIFICANT CHANGE UP (ref 5–17)
APPEARANCE UR: CLEAR — SIGNIFICANT CHANGE UP
BACTERIA # UR AUTO: NEGATIVE — SIGNIFICANT CHANGE UP
BILIRUB UR-MCNC: NEGATIVE — SIGNIFICANT CHANGE UP
BUN SERPL-MCNC: 24 MG/DL — HIGH (ref 7–23)
CALCIUM SERPL-MCNC: 8.7 MG/DL — SIGNIFICANT CHANGE UP (ref 8.5–10.1)
CHLORIDE SERPL-SCNC: 111 MMOL/L — HIGH (ref 96–108)
CO2 SERPL-SCNC: 24 MMOL/L — SIGNIFICANT CHANGE UP (ref 22–31)
COLOR SPEC: YELLOW — SIGNIFICANT CHANGE UP
CREAT SERPL-MCNC: 1.25 MG/DL — SIGNIFICANT CHANGE UP (ref 0.5–1.3)
DIFF PNL FLD: NEGATIVE — SIGNIFICANT CHANGE UP
EPI CELLS # UR: SIGNIFICANT CHANGE UP
GLUCOSE SERPL-MCNC: 152 MG/DL — HIGH (ref 70–99)
GLUCOSE UR QL: 100 MG/DL
HCT VFR BLD CALC: 30.2 % — LOW (ref 34.5–45)
HGB BLD-MCNC: 9 G/DL — LOW (ref 11.5–15.5)
KETONES UR-MCNC: NEGATIVE — SIGNIFICANT CHANGE UP
LEUKOCYTE ESTERASE UR-ACNC: NEGATIVE — SIGNIFICANT CHANGE UP
MAGNESIUM SERPL-MCNC: 2.2 MG/DL — SIGNIFICANT CHANGE UP (ref 1.6–2.6)
MCHC RBC-ENTMCNC: 24.1 PG — LOW (ref 27–34)
MCHC RBC-ENTMCNC: 29.8 GM/DL — LOW (ref 32–36)
MCV RBC AUTO: 81 FL — SIGNIFICANT CHANGE UP (ref 80–100)
NITRITE UR-MCNC: NEGATIVE — SIGNIFICANT CHANGE UP
NRBC # BLD: 0 /100 WBCS — SIGNIFICANT CHANGE UP (ref 0–0)
PH UR: 5 — SIGNIFICANT CHANGE UP (ref 5–8)
PHOSPHATE SERPL-MCNC: 2.4 MG/DL — LOW (ref 2.5–4.5)
PLATELET # BLD AUTO: 131 K/UL — LOW (ref 150–400)
POTASSIUM SERPL-MCNC: 3.5 MMOL/L — SIGNIFICANT CHANGE UP (ref 3.5–5.3)
POTASSIUM SERPL-SCNC: 3.5 MMOL/L — SIGNIFICANT CHANGE UP (ref 3.5–5.3)
PROCALCITONIN SERPL-MCNC: 0.26 NG/ML — HIGH (ref 0.02–0.1)
PROT UR-MCNC: 100 MG/DL
RBC # BLD: 3.73 M/UL — LOW (ref 3.8–5.2)
RBC # FLD: 16.4 % — HIGH (ref 10.3–14.5)
RBC CASTS # UR COMP ASSIST: SIGNIFICANT CHANGE UP /HPF (ref 0–4)
SODIUM SERPL-SCNC: 144 MMOL/L — SIGNIFICANT CHANGE UP (ref 135–145)
SP GR SPEC: 1.02 — SIGNIFICANT CHANGE UP (ref 1.01–1.02)
UROBILINOGEN FLD QL: NEGATIVE MG/DL — SIGNIFICANT CHANGE UP
WBC # BLD: 8.37 K/UL — SIGNIFICANT CHANGE UP (ref 3.8–10.5)
WBC # FLD AUTO: 8.37 K/UL — SIGNIFICANT CHANGE UP (ref 3.8–10.5)
WBC UR QL: SIGNIFICANT CHANGE UP

## 2019-12-19 PROCEDURE — 99221 1ST HOSP IP/OBS SF/LOW 40: CPT

## 2019-12-19 PROCEDURE — 99233 SBSQ HOSP IP/OBS HIGH 50: CPT

## 2019-12-19 RX ORDER — CHOLECALCIFEROL (VITAMIN D3) 125 MCG
2000 CAPSULE ORAL DAILY
Refills: 0 | Status: DISCONTINUED | OUTPATIENT
Start: 2019-12-19 | End: 2019-12-24

## 2019-12-19 RX ORDER — POTASSIUM PHOSPHATE, MONOBASIC POTASSIUM PHOSPHATE, DIBASIC 236; 224 MG/ML; MG/ML
15 INJECTION, SOLUTION INTRAVENOUS ONCE
Refills: 0 | Status: COMPLETED | OUTPATIENT
Start: 2019-12-19 | End: 2019-12-19

## 2019-12-19 RX ORDER — POTASSIUM CHLORIDE 20 MEQ
20 PACKET (EA) ORAL ONCE
Refills: 0 | Status: COMPLETED | OUTPATIENT
Start: 2019-12-19 | End: 2019-12-19

## 2019-12-19 RX ADMIN — HEPARIN SODIUM 5000 UNIT(S): 5000 INJECTION INTRAVENOUS; SUBCUTANEOUS at 06:05

## 2019-12-19 RX ADMIN — Medication 20 MILLIEQUIVALENT(S): at 09:37

## 2019-12-19 RX ADMIN — Medication 2: at 11:27

## 2019-12-19 RX ADMIN — PIPERACILLIN AND TAZOBACTAM 25 GRAM(S): 4; .5 INJECTION, POWDER, LYOPHILIZED, FOR SOLUTION INTRAVENOUS at 00:34

## 2019-12-19 RX ADMIN — LEVETIRACETAM 420 MILLIGRAM(S): 250 TABLET, FILM COATED ORAL at 06:09

## 2019-12-19 RX ADMIN — ATORVASTATIN CALCIUM 40 MILLIGRAM(S): 80 TABLET, FILM COATED ORAL at 21:43

## 2019-12-19 RX ADMIN — PIPERACILLIN AND TAZOBACTAM 25 GRAM(S): 4; .5 INJECTION, POWDER, LYOPHILIZED, FOR SOLUTION INTRAVENOUS at 08:48

## 2019-12-19 RX ADMIN — HEPARIN SODIUM 5000 UNIT(S): 5000 INJECTION INTRAVENOUS; SUBCUTANEOUS at 17:29

## 2019-12-19 RX ADMIN — LEVETIRACETAM 420 MILLIGRAM(S): 250 TABLET, FILM COATED ORAL at 18:25

## 2019-12-19 RX ADMIN — POTASSIUM PHOSPHATE, MONOBASIC POTASSIUM PHOSPHATE, DIBASIC 62.5 MILLIMOLE(S): 236; 224 INJECTION, SOLUTION INTRAVENOUS at 09:37

## 2019-12-19 RX ADMIN — INSULIN GLARGINE 10 UNIT(S): 100 INJECTION, SOLUTION SUBCUTANEOUS at 21:57

## 2019-12-19 RX ADMIN — PIPERACILLIN AND TAZOBACTAM 25 GRAM(S): 4; .5 INJECTION, POWDER, LYOPHILIZED, FOR SOLUTION INTRAVENOUS at 15:24

## 2019-12-19 RX ADMIN — PIPERACILLIN AND TAZOBACTAM 25 GRAM(S): 4; .5 INJECTION, POWDER, LYOPHILIZED, FOR SOLUTION INTRAVENOUS at 22:00

## 2019-12-19 RX ADMIN — Medication 2: at 17:28

## 2019-12-19 NOTE — PROGRESS NOTE ADULT - PROBLEM SELECTOR PLAN 2
fevers resolved on zosyn   presumed gram neg/anaerobic/gram positive/aspiration  PNA  procal elevated   Blood Cx and Urine Cx NGTD   continue with zosyn day 4/7

## 2019-12-19 NOTE — PROGRESS NOTE ADULT - SUBJECTIVE AND OBJECTIVE BOX
Patient is a 89y old  Female who presents with a chief complaint of Weakness. (19 Dec 2019 11:33)      HPI:  89 y/o Female PMH of HTN, DM2, HL, seizure d/o, gout presents to ED for eval of increasing weakness and change in mental status over the past few weeks.  Family states some days pt is at her baseline, then other times she is more lethargic, noted drooling.  Family states pt taken to outside hospital twice for sx, work up neg & dc home.  Family states pt has been compliant w her meds, no known grand mal seizures.  No fall/head trauma.  Pt currently denies pain, denies all complaints; she is poor historian, on memantine, possible dementia. 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 1000 milliLiter(s) (50 mL/Hr) IV Continuous <Continuous>  dextrose 50% Injectable 12.5 Gram(s) IV Push once  dextrose 50% Injectable 25 Gram(s) IV Push once  dextrose 50% Injectable 25 Gram(s) IV Push once  heparin  Injectable 5000 Unit(s) SubCutaneous every 12 hours  insulin glargine Injectable (LANTUS) 10 Unit(s) SubCutaneous at bedtime  insulin lispro (HumaLOG) corrective regimen sliding scale   SubCutaneous three times a day before meals  levETIRAcetam  IVPB 500 milliGRAM(s) IV Intermittent every 12 hours  piperacillin/tazobactam IVPB.. 3.375 Gram(s) IV Intermittent every 8 hours    MEDICATIONS  (PRN):  acetaminophen  Suppository .. 650 milliGRAM(s) Rectal every 6 hours PRN Temp greater or equal to 38C (100.4F), Mild Pain (1 - 3)  albuterol/ipratropium for Nebulization. 3 milliLiter(s) Nebulizer every 6 hours PRN Shortness of Breath and/or Wheezing  dextrose 40% Gel 15 Gram(s) Oral once PRN Blood Glucose LESS THAN 70 milliGRAM(s)/deciliter  glucagon  Injectable 1 milliGRAM(s) IntraMuscular once PRN Glucose LESS THAN 70 milligrams/deciliter      FAMILY HISTORY:  No pertinent family history in first degree relatives      Allergies    No Known Allergies    Intolerances        PMH/PSH:  DM (diabetes mellitus)  Hyperlipidemia  Gout  HTN (hypertension)  CVA (cerebral vascular accident)  No significant past surgical history        REVIEW OF SYSTEMS:  CONSTITUTIONAL: No fever, weight loss,  RESPIRATORY: No cough, wheezing, chills or hemoptysis; No shortness of breath  CARDIOVASCULAR: No chest pain, palpitations, dizziness, or leg swelling  GASTROINTESTINAL: See above      Vital Signs Last 24 Hrs  T(C): 36.2 (19 Dec 2019 11:35), Max: 36.6 (18 Dec 2019 17:41)  T(F): 97.2 (19 Dec 2019 11:35), Max: 97.9 (18 Dec 2019 17:41)  HR: 100 (19 Dec 2019 11:35) (89 - 101)  BP: 132/76 (19 Dec 2019 11:35) (120/57 - 147/77)  BP(mean): --  RR: 16 (19 Dec 2019 11:35) (16 - 18)  SpO2: 100% (19 Dec 2019 11:35) (98% - 100%)    PHYSICAL EXAM:  CHEST/LUNG: Clear to percussion bilaterally; No rales, rhonchi, wheezing, or rubs  HEART: Regular rate and rhythm; No murmurs, rubs, or gallops  ABDOMEN: Soft, Nontender, Nondistended; Bowel sounds present      LAB                          9.0    8.37  )-----------( 131      ( 19 Dec 2019 07:20 )             30.2       CBC:  12-19 @ 07:20  WBC 8.37   Hgb 9.0   Hct 30.2   Plts 131  MCV 81.0  12-18 @ 08:06  WBC 8.75   Hgb 8.4   Hct 27.3   Plts 152  MCV 79.6  12-17 @ 07:32  WBC 6.91   Hgb 8.2   Hct 27.1   Plts 132  MCV 80.4  12-16 @ 06:14  WBC 6.89   Hgb 7.3   Hct 24.3   Plts 133  MCV 80.5  12-15 @ 12:32  WBC 7.61   Hgb 7.5   Hct 24.7   Plts 143  MCV 82.3  12-14 @ 07:40  WBC 7.67   Hgb 7.2   Hct 24.1   Plts 141  MCV 81.1      Chemistry:  12-19 @ 07:20  Na+ 144  K+ 3.5  Cl- 111  CO2 24  BUN 24  Cr 1.25     12-18 @ 08:06  Na+ 142  K+ 3.6  Cl- 110  CO2 24  BUN 25  Cr 1.24     12-17 @ 07:32  Na+ 142  K+ 4.0  Cl- 111  CO2 24  BUN 29  Cr 1.41     12-16 @ 06:14  Na+ 145  K+ 4.0  Cl- 116  CO2 22  BUN 30  Cr 1.32     12-15 @ 12:32  Na+ 147  K+ 3.9  Cl- 117  CO2 22  BUN 33  Cr 1.42     12-14 @ 07:40  Na+ 148  K+ 3.9  Cl- 120  CO2 22  BUN 43  Cr 1.51     12-13 @ 07:18  Na+ 145  K+ 3.9  Cl- 119  CO2 20  BUN 45  Cr 1.71         Glucose, Serum: 152 mg/dL (12-19 @ 07:20)  Glucose, Serum: 145 mg/dL (12-18 @ 08:06)  Glucose, Serum: 157 mg/dL (12-17 @ 07:32)  Glucose, Serum: 143 mg/dL (12-16 @ 06:14)  Glucose, Serum: 133 mg/dL (12-15 @ 12:32)  Glucose, Serum: 137 mg/dL (12-14 @ 07:40)  Glucose, Serum: 159 mg/dL (12-13 @ 07:18)      19 Dec 2019 07:20    144    |  111    |  24     ----------------------------<  152    3.5     |  24     |  1.25   18 Dec 2019 08:06    142    |  110    |  25     ----------------------------<  145    3.6     |  24     |  1.24   17 Dec 2019 07:32    142    |  111    |  29     ----------------------------<  157    4.0     |  24     |  1.41   16 Dec 2019 06:14    145    |  116    |  30     ----------------------------<  143    4.0     |  22     |  1.32   15 Dec 2019 12:32    147    |  117    |  33     ----------------------------<  133    3.9     |  22     |  1.42   14 Dec 2019 07:40    148    |  120    |  43     ----------------------------<  137    3.9     |  22     |  1.51   13 Dec 2019 07:18    145    |  119    |  45     ----------------------------<  159    3.9     |  20     |  1.71     Ca    8.7        19 Dec 2019 07:20  Ca    8.9        18 Dec 2019 08:06  Ca    8.9        17 Dec 2019 07:32  Ca    8.9        16 Dec 2019 06:14  Ca    9.3        15 Dec 2019 12:32  Ca    9.7        14 Dec 2019 07:40  Ca    10.0       13 Dec 2019 07:18  Phos  2.4       19 Dec 2019 07:20  Phos  2.8       18 Dec 2019 08:06  Phos  3.6       17 Dec 2019 07:32  Phos  3.3       16 Dec 2019 06:14  Mg     2.2       19 Dec 2019 07:20  Mg     2.0       18 Dec 2019 08:06  Mg     1.8       17 Dec 2019 07:32  Mg     1.8       16 Dec 2019 06:14                CAPILLARY BLOOD GLUCOSE      POCT Blood Glucose.: 218 mg/dL (19 Dec 2019 10:57)  POCT Blood Glucose.: 147 mg/dL (19 Dec 2019 07:37)  POCT Blood Glucose.: 241 mg/dL (18 Dec 2019 22:32)  POCT Blood Glucose.: 229 mg/dL (18 Dec 2019 16:35)          RADIOLOGY & ADDITIONAL TESTS:    Imaging Personally Reviewed:  [ ] YES  [ ] NO    Consultant(s) Notes Reviewed:  [ ] YES  [ ] NO    Care Discussed with Consultants/Other Providers [ ] YES  [ ] NO

## 2019-12-19 NOTE — PROGRESS NOTE ADULT - SUBJECTIVE AND OBJECTIVE BOX
Patient is a 89y old  Female who presents with a chief complaint of Weakness. (10 Dec 2019 06:42)      Patient seen and examined bedside.   No overnight events,   No complaints.     MEDICATIONS  (STANDING):  atorvastatin 40 milliGRAM(s) Oral at bedtime  dextrose 5%. 1000 milliLiter(s) (50 mL/Hr) IV Continuous <Continuous>  dextrose 50% Injectable 12.5 Gram(s) IV Push once  dextrose 50% Injectable 25 Gram(s) IV Push once  dextrose 50% Injectable 25 Gram(s) IV Push once  heparin  Injectable 5000 Unit(s) SubCutaneous every 12 hours  insulin lispro (HumaLOG) corrective regimen sliding scale   SubCutaneous three times a day before meals  levETIRAcetam  IVPB 500 milliGRAM(s) IV Intermittent every 12 hours  piperacillin/tazobactam IVPB.. 3.375 Gram(s) IV Intermittent every 8 hours    MEDICATIONS  (PRN):  acetaminophen  Suppository .. 650 milliGRAM(s) Rectal every 6 hours PRN Temp greater or equal to 38C (100.4F), Mild Pain (1 - 3)  albuterol/ipratropium for Nebulization. 3 milliLiter(s) Nebulizer every 6 hours PRN Shortness of Breath and/or Wheezing  dextrose 40% Gel 15 Gram(s) Oral once PRN Blood Glucose LESS THAN 70 milliGRAM(s)/deciliter  glucagon  Injectable 1 milliGRAM(s) IntraMuscular once PRN Glucose LESS THAN 70 milligrams/deciliter  Allergies    No Known Allergies    Intolerances    Vital Signs Last 24 Hrs  T(C): 36.6 (19 Dec 2019 16:38), Max: 36.6 (19 Dec 2019 16:38)  T(F): 97.8 (19 Dec 2019 16:38), Max: 97.8 (19 Dec 2019 16:38)  HR: 88 (19 Dec 2019 16:38) (88 - 100)  BP: 114/67 (19 Dec 2019 16:38) (114/67 - 140/80)  BP(mean): --  RR: 17 (19 Dec 2019 16:38) (16 - 18)  SpO2: 100% (19 Dec 2019 16:38) (100% - 100%)    PHYSICAL EXAM:    GENERAL: NAD  HEAD:  Atraumatic, Normocephalic  EYES: EOMI, PERRLA, conjunctiva and sclera clear  ENMT: No tonsillar erythema, exudates, or enlargement; Moist mucous membranes  NECK: Supple, No JVD  CHEST/LUNG:  improved air entry , mild decreased bases,. no wheezing or rhonchi    HEART: Regular rate and rhythm; No murmurs, rubs, or gallops  ABDOMEN: Soft, Nontender, Nondistended; Bowel sounds present  EXTREMITIES:  2+ Peripheral Pulses b/l, No clubbing, cyanosis, or edema BL LE  NERVOUS SYSTEM:  Alert & Oriented X1 demented follows commands, moving all extremities     Labs and imaging reviewed.                                     9.0    8.37  )-----------( 131      ( 19 Dec 2019 07:20 )             30.2   12-    144  |  111<H>  |  24<H>  ----------------------------<  152<H>  3.5   |  24  |  1.25    Ca    8.7      19 Dec 2019 07:20  Phos  2.4       Mg     2.2           Vitamin D, 25-Hydroxy: 22.7: VITD Interpretive Data Result:  30.0-80.0 ng/mL Optimal levels (Reference Range)  >80.0 ng/mL Toxicity possible  20.0-29.0 ng/mL Insufficiency  10.0-19.0 ng/mL Mild to Moderate Deficiency  <10.0 ng/mL Severe Deficiency  Optimal levels for 25-Hydroxy vitamin D are 30.0 ng/mL and above based  upon the Endocrine Society guidelines 2011.  However, there is a lack of  consensus on this and the [place] recommends 20.0 ng/mL and  above as optimal levels.  Vitamin D results may vary depending on the  method of analysis.  The Roche tamika e801 electrochemiluminescent  immunoassay method measures both D2 and D3. ng/mL (. @ 11:12)        Vitamin D, 1, 25-Dihydroxy (.10. @ 14:48)    Vitamin D, 1, 25-Dihydroxy: 69.9 pg/mL    Parathyroid Hormone Intact, Serum (.10. @ 12:39)    Intact PTH: 4: PTH METHOD: Roche  Guide for Interpretation of PTH and Calcium Results                           Calcium             PTH                           MG/DL               PG/ML  Normal                   8.4-10.5            15-65  Primary  Hyperparathyroidism      >10.5               >50  Non-PTH Hypercalcemia    >10.5               0-20  Hypoparathyroidism       <8.4                0-20  Pseudohypoparathyroid    <8.4                >50  This is intended as a guide only. Factors such as sunlight exposure,  Vitamin D status and renal function should be evaluated along with  clinical presentation. pg/mL      Cultures;   Urinalysis Basic - ( 11 Dec 2019 13:27 )    Color: Yellow / Appearance: very cloudy / S.015 / pH: x  Gluc: x / Ketone: Negative  / Bili: Negative / Urobili: Negative mg/dL   Blood: x / Protein: 100 mg/dL / Nitrite: Negative   Leuk Esterase: Moderate / RBC: 11-25 /HPF / WBC TNTC /HPF   Sq Epi: x / Non Sq Epi: Few / Bacteria: Few      Culture - Urine (collected 10 Dec 2019 09:28)  Source: .Urine Clean Catch (Midstream)  Final Report (11 Dec 2019 10:01):    >=3 organisms. Probable collection contamination.            Calcium, Ionized in AM (19 @ 09:18)    Calcium, Ionized: 1.59 mmoL/L      Lipid panel:     CARDIAC MARKERS ( 09 Dec 2019 21:02 )  <.015 ng/mL / x     / x     / x     / x            RADIOLOGY & ADDITIONAL TESTS:    < from: TTE Echo Doppler w/o Cont (19 @ 16:06) >  Summary:   1. Left ventricular ejection fraction, by visual estimation, is 45 to   50%.   2. Normal left ventricular internal cavity size.   3. Spectral Doppler shows impaired relaxation pattern of left   ventricular myocardial filling (Grade I diastolic dysfunction).   4. Normal right ventricular size and function.   5. The left atrium is normal in size.   6. The right atrium is normal in size.   7. There is no evidence of pericardial effusion.   8. Mild mitral annular calcification.   9. Mild to moderate mitral valve regurgitation.  10. Thickening and calcification of the posterior mitral valve leaflet.  11. Moderate tricuspid regurgitation.  12. Structurally normal tricuspid valve, with normal leaflet excursion.  13. Normal trileaflet aortic valve with normal opening.  14. Mild to moderate pulmonic valve regurgitation.  15. Structurally normal pulmonic valve, with normal leaflet excursion.  16. Estimated pulmonary artery systolic pressure is 55.1 mmHg assuming a   right atrial pressure of 10 mmHg, which is consistent with moderate   pulmonary hypertension.  17. Mitral valve mean gradient is 2.7 mmHg consistent with mild mitral   stenosis.      < end of copied text >    Imaging Personally Reviewed:  [ x] YES      Consultant(s) Notes Reviewed:  [x ] YES     Care Discussed with [x ] Consultants [X ] Patient [x ] Family  [x ]    [x ]  Other; RN    care plan discussed with patient's daughters at bedside.

## 2019-12-19 NOTE — PROGRESS NOTE ADULT - PROBLEM SELECTOR PLAN 3
resolved likely related to UTI, hypercalcemia, dehydration, IV fluids, cultures in the presence of dementia and old cva   per daughters she is close to baseline

## 2019-12-19 NOTE — PROGRESS NOTE ADULT - PROBLEM SELECTOR PLAN 7
improving s/p de jesus on 12/11/19   renal images unremarkable    urine cx enterococcus faecalis   appreciate urology note  s/p de jesus removal 12/18/19, PVR 0 x 2

## 2019-12-19 NOTE — PROGRESS NOTE ADULT - SUBJECTIVE AND OBJECTIVE BOX
INTERVAL History:  Weak  Anemia.    Allergies    No Known Allergies    Intolerances        MEDICATIONS  (STANDING):  atorvastatin 40 milliGRAM(s) Oral at bedtime  dextrose 5%. 1000 milliLiter(s) (50 mL/Hr) IV Continuous <Continuous>  dextrose 50% Injectable 12.5 Gram(s) IV Push once  dextrose 50% Injectable 25 Gram(s) IV Push once  dextrose 50% Injectable 25 Gram(s) IV Push once  heparin  Injectable 5000 Unit(s) SubCutaneous every 12 hours  insulin glargine Injectable (LANTUS) 10 Unit(s) SubCutaneous at bedtime  insulin lispro (HumaLOG) corrective regimen sliding scale   SubCutaneous three times a day before meals  levETIRAcetam  IVPB 500 milliGRAM(s) IV Intermittent every 12 hours  piperacillin/tazobactam IVPB.. 3.375 Gram(s) IV Intermittent every 8 hours  potassium chloride    Tablet ER 20 milliEquivalent(s) Oral once  potassium phosphate IVPB 15 milliMole(s) IV Intermittent once    MEDICATIONS  (PRN):  acetaminophen  Suppository .. 650 milliGRAM(s) Rectal every 6 hours PRN Temp greater or equal to 38C (100.4F), Mild Pain (1 - 3)  albuterol/ipratropium for Nebulization. 3 milliLiter(s) Nebulizer every 6 hours PRN Shortness of Breath and/or Wheezing  dextrose 40% Gel 15 Gram(s) Oral once PRN Blood Glucose LESS THAN 70 milliGRAM(s)/deciliter  glucagon  Injectable 1 milliGRAM(s) IntraMuscular once PRN Glucose LESS THAN 70 milligrams/deciliter      Vital Signs Last 24 Hrs  T(C): 36.4 (19 Dec 2019 05:25), Max: 37.6 (18 Dec 2019 12:10)  T(F): 97.5 (19 Dec 2019 05:25), Max: 99.7 (18 Dec 2019 12:10)  HR: 95 (19 Dec 2019 05:25) (89 - 108)  BP: 140/80 (19 Dec 2019 05:25) (120/57 - 147/77)  BP(mean): --  RR: 18 (19 Dec 2019 05:25) (16 - 18)  SpO2: 100% (19 Dec 2019 05:25) (98% - 100%)    PHYSICAL EXAM:  Pallor    EYES: EOMI, PERRLA, conjunctiva and sclera clear  NECK: Supple, No JVD, Normal thyroid  CHEST/LUNG: Clear to percussion bilaterally; No rales, rhonchi,   HEART: Regular rate and rhythm;   ABDOMEN: Soft, Nontender.          LABS:                        9.0    8.37  )-----------( 131      ( 19 Dec 2019 07:20 )             30.2     12-19    144  |  111<H>  |  24<H>  ----------------------------<  152<H>  3.5   |  24  |  1.25    Ca    8.7      19 Dec 2019 07:20  Phos  2.4     12-19  Mg     2.2     12-19              RADIOLOGY & ADDITIONAL STUDIES:    PATHOLOGY:

## 2019-12-19 NOTE — PROGRESS NOTE ADULT - ASSESSMENT
89 y/o Female PMH of HTN, DM2, HL, seizure d/o, gout presents to ED for eval of increasing weakness and change in mental status over the past few weeks.  Family states some days pt is at her baseline, then other times she is more lethargic, noted drooling.  Family states pt taken to outside hospital twice for sx, work up neg & dc home.  Family states pt has been compliant w her meds, no known grand mal seizures.  No fall/head trauma.  Pt currently denies pain, denies all complaints; she is poor historian, on memantine, possible dementia. She is clinically dehydrated with elevated creatinine, has hypercalcemia, likely metabolic/septic encephalopathy.    Pt was initially admitted with a probable UTI ,Acute urinary retention and MARGARET and de jesus was placed and was started on IV Rocephin ,Urine c/s later grew enterococcus . AS leukocytosis was already resolving  the antibiotics was not changed .Pt also was getting azithro for a possible pneumonia   CT abdo abd Chest was performed . She was found to have a suspicious lesion on stomach and underwent EGD yesterday .pt is having fever since yesterday as well .New blood and urine c/s has been sent .She is awake but denies any c/o . antibiotics have been switched to Zosyn now      #DM2, A1c 7.7%  continue with ISS, added lantus 10u at bedtime   FS goal 140-180    #Hypophosphatemia --replete and monitor     #Vitamin D insufficiency --supplement

## 2019-12-19 NOTE — PROGRESS NOTE ADULT - SUBJECTIVE AND OBJECTIVE BOX
Patient is a 89y old  Female who presents with a chief complaint of Weakness. (19 Dec 2019 08:54)      Interval History: Endocrine wise in same state   serum Calcium 8.9 albumin uncorrected    MEDICATIONS  (STANDING):  atorvastatin 40 milliGRAM(s) Oral at bedtime  dextrose 5%. 1000 milliLiter(s) (50 mL/Hr) IV Continuous <Continuous>  dextrose 50% Injectable 12.5 Gram(s) IV Push once  dextrose 50% Injectable 25 Gram(s) IV Push once  dextrose 50% Injectable 25 Gram(s) IV Push once  heparin  Injectable 5000 Unit(s) SubCutaneous every 12 hours  insulin glargine Injectable (LANTUS) 10 Unit(s) SubCutaneous at bedtime  insulin lispro (HumaLOG) corrective regimen sliding scale   SubCutaneous three times a day before meals  levETIRAcetam  IVPB 500 milliGRAM(s) IV Intermittent every 12 hours  piperacillin/tazobactam IVPB.. 3.375 Gram(s) IV Intermittent every 8 hours    MEDICATIONS  (PRN):  acetaminophen  Suppository .. 650 milliGRAM(s) Rectal every 6 hours PRN Temp greater or equal to 38C (100.4F), Mild Pain (1 - 3)  albuterol/ipratropium for Nebulization. 3 milliLiter(s) Nebulizer every 6 hours PRN Shortness of Breath and/or Wheezing  dextrose 40% Gel 15 Gram(s) Oral once PRN Blood Glucose LESS THAN 70 milliGRAM(s)/deciliter  glucagon  Injectable 1 milliGRAM(s) IntraMuscular once PRN Glucose LESS THAN 70 milligrams/deciliter      Allergies    No Known Allergies    Intolerances        REVIEW OF SYSTEMS:  CONSTITUTIONAL: no changes    Vital Signs Last 24 Hrs  T(C): 36.4 (19 Dec 2019 05:25), Max: 37.6 (18 Dec 2019 12:10)  T(F): 97.5 (19 Dec 2019 05:25), Max: 99.7 (18 Dec 2019 12:10)  HR: 95 (19 Dec 2019 05:25) (89 - 108)  BP: 140/80 (19 Dec 2019 05:25) (120/57 - 147/77)  BP(mean): --  RR: 18 (19 Dec 2019 05:25) (16 - 18)  SpO2: 100% (19 Dec 2019 05:25) (98% - 100%)    PHYSICAL EXAM:  GENERAL: no changes  HEAD: Atraumatic, Normocephalic    LABS:        CAPILLARY BLOOD GLUCOSE      POCT Blood Glucose.: 218 mg/dL (19 Dec 2019 10:57)  POCT Blood Glucose.: 147 mg/dL (19 Dec 2019 07:37)  POCT Blood Glucose.: 241 mg/dL (18 Dec 2019 22:32)  POCT Blood Glucose.: 229 mg/dL (18 Dec 2019 16:35)  POCT Blood Glucose.: 203 mg/dL (18 Dec 2019 12:16)    Lipid panel:           Thyroid:  Diabetes Tests:  Parathyroid Panel:  Adrenals:  RADIOLOGY & ADDITIONAL TESTS:    Imaging Personally Reviewed:  [ ] YES  [ ] NO    Consultant(s) Notes Reviewed:  [ ] YES  [ ] NO    Care Discussed with Consultants/Other Providers [ ] YES  [ ] NO

## 2019-12-19 NOTE — CONSULT NOTE ADULT - ASSESSMENT
89 yo female found to have anemia, splenomegaly and hypercalcemia (now resolved).  IR consulted for a bone marrow biopsy      Plan:  Both daughters are coming in today to discuss the procedure, will f/u

## 2019-12-19 NOTE — PROGRESS NOTE ADULT - PROBLEM SELECTOR PLAN 1
associated with anemia unspecified possible lymphoproliferative disorder   PTH low ,   PTHRP wnl  vitamin d level wnl ( low normal)  ruled out hyperthyroid check tsh wnl  free t4 wnl   spep and upep noted: no m spike ,  no bence israel protein    endocrine consult reviewed and appreciated s/p Aredia on 12/11/19 12/13/19 obtained ct chest abd pelvis ? lobular stomach therefore consulted gi s/p  egd on Monday 12/16/19 c/w gastritis no mass    BM Bx jenny for Friday 12/20/19 by IR , discussed with daughters at bedside

## 2019-12-20 ENCOUNTER — RESULT REVIEW (OUTPATIENT)
Age: 84
End: 2019-12-20

## 2019-12-20 LAB
ALBUMIN SERPL ELPH-MCNC: 2.2 G/DL — LOW (ref 3.3–5)
ALP SERPL-CCNC: 65 U/L — SIGNIFICANT CHANGE UP (ref 40–120)
ALT FLD-CCNC: 51 U/L — SIGNIFICANT CHANGE UP (ref 12–78)
ANION GAP SERPL CALC-SCNC: 7 MMOL/L — SIGNIFICANT CHANGE UP (ref 5–17)
APTT BLD: 26 SEC — LOW (ref 28.5–37)
AST SERPL-CCNC: 26 U/L — SIGNIFICANT CHANGE UP (ref 15–37)
BILIRUB DIRECT SERPL-MCNC: 0.07 MG/DL — SIGNIFICANT CHANGE UP (ref 0.05–0.2)
BILIRUB INDIRECT FLD-MCNC: 0.2 MG/DL — SIGNIFICANT CHANGE UP (ref 0.2–1)
BILIRUB SERPL-MCNC: 0.3 MG/DL — SIGNIFICANT CHANGE UP (ref 0.2–1.2)
BUN SERPL-MCNC: 23 MG/DL — SIGNIFICANT CHANGE UP (ref 7–23)
CALCIUM SERPL-MCNC: 8.1 MG/DL — LOW (ref 8.5–10.1)
CHLORIDE SERPL-SCNC: 112 MMOL/L — HIGH (ref 96–108)
CO2 SERPL-SCNC: 24 MMOL/L — SIGNIFICANT CHANGE UP (ref 22–31)
CREAT SERPL-MCNC: 1.22 MG/DL — SIGNIFICANT CHANGE UP (ref 0.5–1.3)
FERRITIN SERPL-MCNC: 830 NG/ML — HIGH (ref 15–150)
FOLATE SERPL-MCNC: 9.7 NG/ML — SIGNIFICANT CHANGE UP
GLUCOSE SERPL-MCNC: 125 MG/DL — HIGH (ref 70–99)
HCT VFR BLD CALC: 24.5 % — LOW (ref 34.5–45)
HCT VFR BLD CALC: 24.8 % — LOW (ref 34.5–45)
HGB BLD-MCNC: 7.3 G/DL — LOW (ref 11.5–15.5)
HGB BLD-MCNC: 7.5 G/DL — LOW (ref 11.5–15.5)
INR BLD: 1.17 RATIO — HIGH (ref 0.88–1.16)
IRON SATN MFR SERPL: 15 % — SIGNIFICANT CHANGE UP (ref 14–50)
IRON SATN MFR SERPL: 25 UG/DL — LOW (ref 30–160)
LDH SERPL L TO P-CCNC: 418 U/L — HIGH (ref 50–242)
MAGNESIUM SERPL-MCNC: 2.1 MG/DL — SIGNIFICANT CHANGE UP (ref 1.6–2.6)
MCHC RBC-ENTMCNC: 24 PG — LOW (ref 27–34)
MCHC RBC-ENTMCNC: 24.5 PG — LOW (ref 27–34)
MCHC RBC-ENTMCNC: 29.8 GM/DL — LOW (ref 32–36)
MCHC RBC-ENTMCNC: 30.2 GM/DL — LOW (ref 32–36)
MCV RBC AUTO: 80.6 FL — SIGNIFICANT CHANGE UP (ref 80–100)
MCV RBC AUTO: 81 FL — SIGNIFICANT CHANGE UP (ref 80–100)
NRBC # BLD: 0 /100 WBCS — SIGNIFICANT CHANGE UP (ref 0–0)
NRBC # BLD: 0 /100 WBCS — SIGNIFICANT CHANGE UP (ref 0–0)
PHOSPHATE SERPL-MCNC: 2.3 MG/DL — LOW (ref 2.5–4.5)
PLATELET # BLD AUTO: 166 K/UL — SIGNIFICANT CHANGE UP (ref 150–400)
PLATELET # BLD AUTO: 170 K/UL — SIGNIFICANT CHANGE UP (ref 150–400)
POTASSIUM SERPL-MCNC: 3.5 MMOL/L — SIGNIFICANT CHANGE UP (ref 3.5–5.3)
POTASSIUM SERPL-SCNC: 3.5 MMOL/L — SIGNIFICANT CHANGE UP (ref 3.5–5.3)
PROT SERPL-MCNC: 5.5 GM/DL — LOW (ref 6–8.3)
PROTHROM AB SERPL-ACNC: 13.2 SEC — HIGH (ref 10–12.9)
RBC # BLD: 3.04 M/UL — LOW (ref 3.8–5.2)
RBC # BLD: 3.06 M/UL — LOW (ref 3.8–5.2)
RBC # FLD: 16.3 % — HIGH (ref 10.3–14.5)
RBC # FLD: 16.5 % — HIGH (ref 10.3–14.5)
SODIUM SERPL-SCNC: 143 MMOL/L — SIGNIFICANT CHANGE UP (ref 135–145)
TIBC SERPL-MCNC: 171 UG/DL — LOW (ref 220–430)
UIBC SERPL-MCNC: 146 UG/DL — SIGNIFICANT CHANGE UP (ref 110–370)
VIT B12 SERPL-MCNC: 811 PG/ML — SIGNIFICANT CHANGE UP (ref 232–1245)
WBC # BLD: 7.5 K/UL — SIGNIFICANT CHANGE UP (ref 3.8–10.5)
WBC # BLD: 7.62 K/UL — SIGNIFICANT CHANGE UP (ref 3.8–10.5)
WBC # FLD AUTO: 7.5 K/UL — SIGNIFICANT CHANGE UP (ref 3.8–10.5)
WBC # FLD AUTO: 7.62 K/UL — SIGNIFICANT CHANGE UP (ref 3.8–10.5)

## 2019-12-20 PROCEDURE — 99233 SBSQ HOSP IP/OBS HIGH 50: CPT

## 2019-12-20 PROCEDURE — 88341 IMHCHEM/IMCYTCHM EA ADD ANTB: CPT | Mod: 26,59

## 2019-12-20 PROCEDURE — 88305 TISSUE EXAM BY PATHOLOGIST: CPT | Mod: 26

## 2019-12-20 PROCEDURE — 88313 SPECIAL STAINS GROUP 2: CPT | Mod: 26

## 2019-12-20 PROCEDURE — 20225 BONE BIOPSY TROCAR/NDL DEEP: CPT

## 2019-12-20 PROCEDURE — 99232 SBSQ HOSP IP/OBS MODERATE 35: CPT

## 2019-12-20 PROCEDURE — 88342 IMHCHEM/IMCYTCHM 1ST ANTB: CPT | Mod: 26,59

## 2019-12-20 PROCEDURE — 88189 FLOWCYTOMETRY/READ 16 & >: CPT

## 2019-12-20 PROCEDURE — 85097 BONE MARROW INTERPRETATION: CPT

## 2019-12-20 PROCEDURE — 77012 CT SCAN FOR NEEDLE BIOPSY: CPT | Mod: 26

## 2019-12-20 PROCEDURE — 88360 TUMOR IMMUNOHISTOCHEM/MANUAL: CPT | Mod: 26,59

## 2019-12-20 RX ORDER — MULTIVIT WITH MIN/MFOLATE/K2 340-15/3 G
1 POWDER (GRAM) ORAL ONCE
Refills: 0 | Status: COMPLETED | OUTPATIENT
Start: 2019-12-20 | End: 2019-12-20

## 2019-12-20 RX ORDER — POLYETHYLENE GLYCOL 3350 17 G/17G
17 POWDER, FOR SOLUTION ORAL DAILY
Refills: 0 | Status: DISCONTINUED | OUTPATIENT
Start: 2019-12-20 | End: 2019-12-24

## 2019-12-20 RX ORDER — SENNA PLUS 8.6 MG/1
2 TABLET ORAL AT BEDTIME
Refills: 0 | Status: DISCONTINUED | OUTPATIENT
Start: 2019-12-20 | End: 2019-12-24

## 2019-12-20 RX ORDER — POTASSIUM PHOSPHATE, MONOBASIC POTASSIUM PHOSPHATE, DIBASIC 236; 224 MG/ML; MG/ML
15 INJECTION, SOLUTION INTRAVENOUS ONCE
Refills: 0 | Status: COMPLETED | OUTPATIENT
Start: 2019-12-20 | End: 2019-12-20

## 2019-12-20 RX ADMIN — INSULIN GLARGINE 10 UNIT(S): 100 INJECTION, SOLUTION SUBCUTANEOUS at 22:09

## 2019-12-20 RX ADMIN — LEVETIRACETAM 420 MILLIGRAM(S): 250 TABLET, FILM COATED ORAL at 05:53

## 2019-12-20 RX ADMIN — Medication 1 BOTTLE: at 14:48

## 2019-12-20 RX ADMIN — POTASSIUM PHOSPHATE, MONOBASIC POTASSIUM PHOSPHATE, DIBASIC 62.5 MILLIMOLE(S): 236; 224 INJECTION, SOLUTION INTRAVENOUS at 11:03

## 2019-12-20 RX ADMIN — ATORVASTATIN CALCIUM 40 MILLIGRAM(S): 80 TABLET, FILM COATED ORAL at 22:05

## 2019-12-20 RX ADMIN — PIPERACILLIN AND TAZOBACTAM 25 GRAM(S): 4; .5 INJECTION, POWDER, LYOPHILIZED, FOR SOLUTION INTRAVENOUS at 07:35

## 2019-12-20 RX ADMIN — PIPERACILLIN AND TAZOBACTAM 25 GRAM(S): 4; .5 INJECTION, POWDER, LYOPHILIZED, FOR SOLUTION INTRAVENOUS at 22:06

## 2019-12-20 RX ADMIN — Medication 2000 UNIT(S): at 11:03

## 2019-12-20 RX ADMIN — PIPERACILLIN AND TAZOBACTAM 25 GRAM(S): 4; .5 INJECTION, POWDER, LYOPHILIZED, FOR SOLUTION INTRAVENOUS at 14:33

## 2019-12-20 RX ADMIN — SENNA PLUS 2 TABLET(S): 8.6 TABLET ORAL at 22:05

## 2019-12-20 RX ADMIN — LEVETIRACETAM 420 MILLIGRAM(S): 250 TABLET, FILM COATED ORAL at 17:23

## 2019-12-20 RX ADMIN — POLYETHYLENE GLYCOL 3350 17 GRAM(S): 17 POWDER, FOR SOLUTION ORAL at 14:48

## 2019-12-20 NOTE — PROGRESS NOTE ADULT - SUBJECTIVE AND OBJECTIVE BOX
Patient is a 89y old  Female who presents with a chief complaint of Weakness. (10 Dec 2019 06:42)      Patient seen and examined bedside.   No overnight events,   No complaints.     MEDICATIONS  (STANDING):  atorvastatin 40 milliGRAM(s) Oral at bedtime  dextrose 5%. 1000 milliLiter(s) (50 mL/Hr) IV Continuous <Continuous>  dextrose 50% Injectable 12.5 Gram(s) IV Push once  dextrose 50% Injectable 25 Gram(s) IV Push once  dextrose 50% Injectable 25 Gram(s) IV Push once  heparin  Injectable 5000 Unit(s) SubCutaneous every 12 hours  insulin lispro (HumaLOG) corrective regimen sliding scale   SubCutaneous three times a day before meals  levETIRAcetam  IVPB 500 milliGRAM(s) IV Intermittent every 12 hours  piperacillin/tazobactam IVPB.. 3.375 Gram(s) IV Intermittent every 8 hours    MEDICATIONS  (PRN):  acetaminophen  Suppository .. 650 milliGRAM(s) Rectal every 6 hours PRN Temp greater or equal to 38C (100.4F), Mild Pain (1 - 3)  albuterol/ipratropium for Nebulization. 3 milliLiter(s) Nebulizer every 6 hours PRN Shortness of Breath and/or Wheezing  dextrose 40% Gel 15 Gram(s) Oral once PRN Blood Glucose LESS THAN 70 milliGRAM(s)/deciliter  glucagon  Injectable 1 milliGRAM(s) IntraMuscular once PRN Glucose LESS THAN 70 milligrams/deciliter  Allergies    No Known Allergies    Intolerances    Vitals noted.     PHYSICAL EXAM:    GENERAL: NAD  HEAD:  Atraumatic, Normocephalic  EYES: EOMI, PERRLA, conjunctiva and sclera clear  ENMT: No tonsillar erythema, exudates, or enlargement; Moist mucous membranes  NECK: Supple, No JVD  CHEST/LUNG:  improved air entry , mild decreased bases,. no wheezing or rhonchi    HEART: Regular rate and rhythm; No murmurs, rubs, or gallops  ABDOMEN: Soft, Nontender, Nondistended; Bowel sounds present  EXTREMITIES:  2+ Peripheral Pulses b/l, No clubbing, cyanosis, or edema BL LE  NERVOUS SYSTEM:  Alert & Oriented X1 demented follows commands, moving all extremities     Labs and imaging reviewed.                                     7.3    7.62  )-----------( 166      ( 20 Dec 2019 10:26 )             24.5   12-20    143  |  112<H>  |  23  ----------------------------<  125<H>  3.5   |  24  |  1.22    Ca    8.1<L>      20 Dec 2019 08:10  Phos  2.3     12-20  Mg     2.1     12-20    TPro  5.5<L>  /  Alb  2.2<L>  /  TBili  0.3  /  DBili  .07  /  AST  26  /  ALT  51  /  AlkPhos  65  12-20      Vitamin D, 25-Hydroxy: 22.7: VITD Interpretive Data Result:  30.0-80.0 ng/mL Optimal levels (Reference Range)  >80.0 ng/mL Toxicity possible  20.0-29.0 ng/mL Insufficiency  10.0-19.0 ng/mL Mild to Moderate Deficiency  <10.0 ng/mL Severe Deficiency  Optimal levels for 25-Hydroxy vitamin D are 30.0 ng/mL and above based  upon the Endocrine Society guidelines 2011.  However, there is a lack of  consensus on this and the [place] [employer] recommends 20.0 ng/mL and  above as optimal levels.  Vitamin D results may vary depending on the  method of analysis.  The Roche tamika e801 electrochemiluminescent  immunoassay method measures both D2 and D3. ng/mL (12.19.19 @ 11:12)        Vitamin D, 1, 25-Dihydroxy (12.10.19 @ 14:48)    Vitamin D, 1, 25-Dihydroxy: 69.9 pg/mL    Parathyroid Hormone Intact, Serum (12.10.19 @ 12:39)    Intact PTH: 4: PTH METHOD: Roche  Guide for Interpretation of PTH and Calcium Results                           Calcium             PTH                           MG/DL               PG/ML  Normal                   8.4-10.5            15-65  Primary  Hyperparathyroidism      >10.5               >50  Non-PTH Hypercalcemia    >10.5               0-20  Hypoparathyroidism       <8.4                0-20  Pseudohypoparathyroid    <8.4                >50  This is intended as a guide only. Factors such as sunlight exposure,  Vitamin D status and renal function should be evaluated along with  clinical presentation. pg/mL      Cultures;   Urinalysis Basic - ( 11 Dec 2019 13:27 )    Color: Yellow / Appearance: very cloudy / S.015 / pH: x  Gluc: x / Ketone: Negative  / Bili: Negative / Urobili: Negative mg/dL   Blood: x / Protein: 100 mg/dL / Nitrite: Negative   Leuk Esterase: Moderate / RBC: 11-25 /HPF / WBC TNTC /HPF   Sq Epi: x / Non Sq Epi: Few / Bacteria: Few      Culture - Urine (collected 10 Dec 2019 09:28)  Source: .Urine Clean Catch (Midstream)  Final Report (11 Dec 2019 10:01):    >=3 organisms. Probable collection contamination.            Calcium, Ionized in AM (19 @ 09:18)    Calcium, Ionized: 1.59 mmoL/L      Lipid panel:     CARDIAC MARKERS ( 09 Dec 2019 21:02 )  <.015 ng/mL / x     / x     / x     / x        Procalcitonin, Serum: 0.26: Procalcitonin (PCT) Interpretation (ng/mL) - Diagnosis of systemic  bacterial infection/sepsis  PCT < 0.5: Systemic infection (sepsis) is not likely and risk for  progression to severe systemic infection is low. Local bacterial  infection is possible. If early sepsis is suspected clinically, PCT  should be re-assessed in 6-24 hours.  PCT >/= 0.5 but < 2.0: Systemic infection (sepsis) is possible, but other  conditions are known to elevate PCT as well. Moderate risk for  progression to severe systemic infection. The patient should be closely  monitored both clinically and by re-assessing PCT within 6-24 hours.  PCT >/= 2.0 but < 10.0: Systemic infection (sepsis) is likely, unless  other causes are known. High risk of progression to severe systemic  infection (severe sepsis/septic shock).  PCT >/= 10.0: Important systemic inflammatory response, almost  exclusively due to severe bacterial sepsis or septic shock. High  likelihood of severe sepsis or septic shock. ng/mL (19 @ 18:49)        RADIOLOGY & ADDITIONAL TESTS:    < from: TTE Echo Doppler w/o Cont (19 @ 16:06) >  Summary:   1. Left ventricular ejection fraction, by visual estimation, is 45 to   50%.   2. Normal left ventricular internal cavity size.   3. Spectral Doppler shows impaired relaxation pattern of left   ventricular myocardial filling (Grade I diastolic dysfunction).   4. Normal right ventricular size and function.   5. The left atrium is normal in size.   6. The right atrium is normal in size.   7. There is no evidence of pericardial effusion.   8. Mild mitral annular calcification.   9. Mild to moderate mitral valve regurgitation.  10. Thickening and calcification of the posterior mitral valve leaflet.  11. Moderate tricuspid regurgitation.  12. Structurally normal tricuspid valve, with normal leaflet excursion.  13. Normal trileaflet aortic valve with normal opening.  14. Mild to moderate pulmonic valve regurgitation.  15. Structurally normal pulmonic valve, with normal leaflet excursion.  16. Estimated pulmonary artery systolic pressure is 55.1 mmHg assuming a   right atrial pressure of 10 mmHg, which is consistent with moderate   pulmonary hypertension.  17. Mitral valve mean gradient is 2.7 mmHg consistent with mild mitral   stenosis.      < end of copied text >    Imaging Personally Reviewed:  [ x] YES      Consultant(s) Notes Reviewed:  [x ] YES     Care Discussed with [x ] Consultants [X ] Patient [x ] Family  [x ]    [x ]  Other; RN    care plan discussed with patient's daughters at bedside.

## 2019-12-20 NOTE — PROGRESS NOTE ADULT - ASSESSMENT
89 y/o Female PMH of HTN, DM2, HL, seizure d/o, gout presents to ED for eval of increasing weakness and change in mental status over the past few weeks.  Family states some days pt is at her baseline, then other times she is more lethargic, noted drooling.  Family states pt taken to outside hospital twice for sx, work up neg & dc home.  Family states pt has been compliant w her meds, no known grand mal seizures.  No fall/head trauma.  Pt currently denies pain, denies all complaints; she is poor historian, on memantine, possible dementia. She is clinically dehydrated with elevated creatinine, has hypercalcemia, likely metabolic/septic encephalopathy.    Pt was initially admitted with a probable UTI ,Acute urinary retention and MARGARET and de jesus was placed and was started on IV Rocephin ,Urine c/s later grew enterococcus . AS leukocytosis was already resolving  the antibiotics was not changed .Pt also was getting azithro for a possible pneumonia   CT abdo abd Chest was performed . She was found to have a suspicious lesion on stomach and underwent EGD.pt is having fever since EGD as well .New blood and urine c/s has been sent .She is awake but denies any c/o . antibiotics have been switched to Zosyn.     Dispo: SO CARD done       #DM2, A1c 7.7%  continue with ISS,  d/c lantus for now and monitor FS as they are on low side   FS goal 140-180    #Hypophosphatemia --replete and monitor     #Vitamin D insufficiency --supplement

## 2019-12-20 NOTE — PROGRESS NOTE ADULT - SUBJECTIVE AND OBJECTIVE BOX
Pt seen resting comfortably, no apparent distress, as per nursing staff pt did not void an dbladder scan was performed which showed 700+ml in bladder, pt was straight cath (720cc) urine return. Pt denies any abdominal pain currently. bladder scan at bedside showing <35cc in bladder.    T(F): 98.2 (12-20-19 @ 11:50), Max: 98.2 (12-20-19 @ 11:50)  HR: 83 (12-20-19 @ 11:50) (83 - 90)  BP: 113/66 (12-20-19 @ 11:50) (113/66 - 122/64)  RR: 17 (12-20-19 @ 11:50) (17 - 18)  SpO2: 100% (12-20-19 @ 11:50) (100% - 100%)  Wt(kg): --  CAPILLARY BLOOD GLUCOSE      POCT Blood Glucose.: 137 mg/dL (20 Dec 2019 10:29)  POCT Blood Glucose.: 133 mg/dL (20 Dec 2019 07:28)  POCT Blood Glucose.: 199 mg/dL (19 Dec 2019 21:54)  POCT Blood Glucose.: 233 mg/dL (19 Dec 2019 17:03)      PHYSICAL EXAM:  General: Awake, alert, NAD  CV: +S1S2 regular rate and rhythm  Lung: Respirations nonlabored  Abdomen: Soft  : no bladder tenderness or distention.    LABS:                        7.3    7.62  )-----------( 166      ( 20 Dec 2019 10:26 )             24.5   12-20    143  |  112<H>  |  23  ----------------------------<  125<H>  3.5   |  24  |  1.22    Ca    8.1<L>      20 Dec 2019 08:10  Phos  2.3     12-20  Mg     2.1     12-20    TPro  5.5<L>  /  Alb  2.2<L>  /  TBili  0.3  /  DBili  .07  /  AST  26  /  ALT  51  /  AlkPhos  65  12-20  PT/INR - ( 20 Dec 2019 08:10 )   PT: 13.2 sec;   INR: 1.17 ratio         PTT - ( 20 Dec 2019 08:10 )  PTT:26.0 sec  I&O's Detail    19 Dec 2019 07:01  -  20 Dec 2019 07:00  --------------------------------------------------------  IN:  Total IN: 0 mL    OUT:    Intermittent Catheterization - Urethral: 750 mL  Total OUT: 750 mL    Total NET: -750 mL          Impression: 89F with UR possibly due to neurogenic bladder, pt had De Jesus removed and was unable to urinate, requiring straightcath  Plan:  - f/u void  - recommend replacing de jesus if pt unable to urinate and has high bladder volumes  - continue medical management and supportive care

## 2019-12-20 NOTE — PROGRESS NOTE ADULT - SUBJECTIVE AND OBJECTIVE BOX
Patient s/p CT guided bone marrow biopsy/aspiration    Operators: Stephy FOSTER,    Findings:  pt s/p bone marrow bx/aspiration from the left posterior iliac bone.  Hemostasis achieved.  DSD applied.  Pt tolerated procedure well.  Vitals remained stable.  Post procedure CT shows no complications      Complications: None.    Blood loss:  minimal    Anesthesia:  local lidocaine with epi and 25mcg of fentanyl      ASSESSMENT:  89 yo female s/p CT guided bone marrow biopsy/aspiration.  Pt found to have anemia, splenomegaly and hypercalcemia (now resolved).     PLAN:  -f/u bone marrow bx results  -heparin sq can be resumed tomorrow if still prescribed

## 2019-12-20 NOTE — PROGRESS NOTE ADULT - ASSESSMENT
89 y/o Female PMH of HTN, DM2, HL, seizure d/o, gout presents to ED for eval of increasing weakness and change in mental status over the past few weeks.   Pt was intially admitted with a probable UTI ,Acute urinary retention and MARGARET and de jesus was placed and was started on IV Rocephin ,Urine c/s later grew enterococcus . AS leukocytosis was already resolving  the antibiotics was not changed .Pt also was getting azithro for a possible pneumonia   CT abdo abd Chest was performed . She was found to have a suspicious lesion on stomach and underwent EGD yesterday .pt is having fever since yesterday as well .New blood and urine c/s has been sent .She is awake but denies any c/o . antibiotics have been switched to Zosyn now . Now seen with   1.Fever :resolving ,?  post procedural fever   as PCT WNL and urine and blood c/s negative  d/c antibiotics ,PCT WNL      2.S/p EGD : doubt if fever is post procedural     3.MARGARET : resolved

## 2019-12-20 NOTE — PROGRESS NOTE ADULT - PROBLEM SELECTOR PLAN 1
associated with anemia unspecified possible lymphoproliferative disorder   PTH low ,   PTHRP wnl  vitamin d level wnl ( low normal)  ruled out hyperthyroid check tsh wnl  free t4 wnl   spep and upep noted: no m spike ,  no bence israel protein    endocrine consult reviewed and appreciated s/p Aredia on 12/11/19 12/13/19 obtained ct chest abd pelvis ? lobular stomach therefore consulted gi s/p  egd on Monday 12/16/19 c/w gastritis no mass    BM Bx done Friday 12/20/19 by IR , discussed with daughters at bedside

## 2019-12-20 NOTE — PROGRESS NOTE ADULT - PROBLEM SELECTOR PLAN 2
fevers resolved on zosyn   presumed gram neg/anaerobic/gram positive/aspiration  PNA  Blood Cx and Urine Cx NGTD   completed 5 days of Zosyn, per ID OK to d/c

## 2019-12-20 NOTE — PROGRESS NOTE ADULT - SUBJECTIVE AND OBJECTIVE BOX
Patient is a 89y old  Female who presents with a chief complaint of Weakness. (19 Dec 2019 22:51)      INTERVAL HPI/OVERNIGHT EVENTS:  pt NAD  getting bladder scan  NAD  no complaints    MEDICATIONS  (STANDING):  atorvastatin 40 milliGRAM(s) Oral at bedtime  cholecalciferol 2000 Unit(s) Oral daily  dextrose 5%. 1000 milliLiter(s) (50 mL/Hr) IV Continuous <Continuous>  dextrose 50% Injectable 12.5 Gram(s) IV Push once  dextrose 50% Injectable 25 Gram(s) IV Push once  dextrose 50% Injectable 25 Gram(s) IV Push once  insulin glargine Injectable (LANTUS) 10 Unit(s) SubCutaneous at bedtime  insulin lispro (HumaLOG) corrective regimen sliding scale   SubCutaneous three times a day before meals  levETIRAcetam  IVPB 500 milliGRAM(s) IV Intermittent every 12 hours  piperacillin/tazobactam IVPB.. 3.375 Gram(s) IV Intermittent every 8 hours  potassium phosphate IVPB 15 milliMole(s) IV Intermittent once    MEDICATIONS  (PRN):  acetaminophen  Suppository .. 650 milliGRAM(s) Rectal every 6 hours PRN Temp greater or equal to 38C (100.4F), Mild Pain (1 - 3)  albuterol/ipratropium for Nebulization. 3 milliLiter(s) Nebulizer every 6 hours PRN Shortness of Breath and/or Wheezing  dextrose 40% Gel 15 Gram(s) Oral once PRN Blood Glucose LESS THAN 70 milliGRAM(s)/deciliter  glucagon  Injectable 1 milliGRAM(s) IntraMuscular once PRN Glucose LESS THAN 70 milligrams/deciliter      REVIEW OF SYSTEMS:  CONSTITUTIONAL: No fever, weight loss, or fatigue  RESPIRATORY: No cough, wheezing, chills or hemoptysis; No shortness of breath  CARDIOVASCULAR: No chest pain, palpitations, dizziness, or leg swelling  GASTROINTESTINAL: No abdominal or epigastric pain. No nausea, vomiting, or hematemesis; No diarrhea or constipation. No melena or hematochezia.  ENDOCRINE: No heat or cold intolerance; No hair loss      Vital Signs Last 24 Hrs  T(C): 36.6 (20 Dec 2019 05:38), Max: 36.6 (19 Dec 2019 16:38)  T(F): 97.8 (20 Dec 2019 05:38), Max: 97.8 (19 Dec 2019 16:38)  HR: 90 (20 Dec 2019 05:38) (85 - 100)  BP: 117/62 (20 Dec 2019 05:38) (114/67 - 132/76)  BP(mean): --  RR: 18 (20 Dec 2019 05:38) (16 - 18)  SpO2: 100% (20 Dec 2019 05:38) (100% - 100%)    PHYSICAL EXAM:  GENERAL: NAD, well-groomed, well-developed        LABS:                        7.3    7.62  )-----------( 166      ( 20 Dec 2019 10:26 )             24.5     12-20    143  |  112<H>  |  23  ----------------------------<  125<H>  3.5   |  24  |  1.22    Ca    8.1<L>      20 Dec 2019 08:10  Phos  2.3     12-20  Mg     2.1     12-20    TPro  5.5<L>  /  Alb  2.2<L>  /  TBili  0.3  /  DBili  .07  /  AST  26  /  ALT  51  /  AlkPhos  65  12-20    PT/INR - ( 20 Dec 2019 08:10 )   PT: 13.2 sec;   INR: 1.17 ratio         PTT - ( 20 Dec 2019 08:10 )  PTT:26.0 sec  Urinalysis Basic - ( 19 Dec 2019 21:05 )    Color: Yellow / Appearance: Clear / S.020 / pH: x  Gluc: x / Ketone: Negative  / Bili: Negative / Urobili: Negative mg/dL   Blood: x / Protein: 100 mg/dL / Nitrite: Negative   Leuk Esterase: Negative / RBC: 0-2 /HPF / WBC 0-2   Sq Epi: x / Non Sq Epi: Occasional / Bacteria: Negative      CAPILLARY BLOOD GLUCOSE      POCT Blood Glucose.: 137 mg/dL (20 Dec 2019 10:29)  POCT Blood Glucose.: 133 mg/dL (20 Dec 2019 07:28)  POCT Blood Glucose.: 199 mg/dL (19 Dec 2019 21:54)  POCT Blood Glucose.: 233 mg/dL (19 Dec 2019 17:03)  POCT Blood Glucose.: 218 mg/dL (19 Dec 2019 10:57)    Lipid panel:               RADIOLOGY & ADDITIONAL TESTS:

## 2019-12-20 NOTE — PROGRESS NOTE ADULT - SUBJECTIVE AND OBJECTIVE BOX
INTERVAL History:  Weak  Anemia      Allergies    No Known Allergies    Intolerances        MEDICATIONS  (STANDING):  atorvastatin 40 milliGRAM(s) Oral at bedtime  cholecalciferol 2000 Unit(s) Oral daily  dextrose 5%. 1000 milliLiter(s) (50 mL/Hr) IV Continuous <Continuous>  dextrose 50% Injectable 12.5 Gram(s) IV Push once  dextrose 50% Injectable 25 Gram(s) IV Push once  dextrose 50% Injectable 25 Gram(s) IV Push once  insulin glargine Injectable (LANTUS) 10 Unit(s) SubCutaneous at bedtime  insulin lispro (HumaLOG) corrective regimen sliding scale   SubCutaneous three times a day before meals  levETIRAcetam  IVPB 500 milliGRAM(s) IV Intermittent every 12 hours  magnesium citrate Oral Solution 1 Bottle Oral once  piperacillin/tazobactam IVPB.. 3.375 Gram(s) IV Intermittent every 8 hours  polyethylene glycol 3350 17 Gram(s) Oral daily  senna 2 Tablet(s) Oral at bedtime    MEDICATIONS  (PRN):  acetaminophen  Suppository .. 650 milliGRAM(s) Rectal every 6 hours PRN Temp greater or equal to 38C (100.4F), Mild Pain (1 - 3)  albuterol/ipratropium for Nebulization. 3 milliLiter(s) Nebulizer every 6 hours PRN Shortness of Breath and/or Wheezing  dextrose 40% Gel 15 Gram(s) Oral once PRN Blood Glucose LESS THAN 70 milliGRAM(s)/deciliter  glucagon  Injectable 1 milliGRAM(s) IntraMuscular once PRN Glucose LESS THAN 70 milligrams/deciliter      Vital Signs Last 24 Hrs  T(C): 36.8 (20 Dec 2019 11:50), Max: 36.8 (20 Dec 2019 11:50)  T(F): 98.2 (20 Dec 2019 11:50), Max: 98.2 (20 Dec 2019 11:50)  HR: 83 (20 Dec 2019 11:50) (83 - 90)  BP: 113/66 (20 Dec 2019 11:50) (113/66 - 122/64)  BP(mean): --  RR: 17 (20 Dec 2019 11:50) (17 - 18)  SpO2: 100% (20 Dec 2019 11:50) (100% - 100%)    PHYSICAL EXAM:    Pallor  EYES: EOMI, PERRLA, conjunctiva and sclera clear  NECK: Supple, No JVD, Normal thyroid  CHEST/LUNG: Clear to percussion bilaterally; No rales, rhonchi,   HEART: Regular rate and rhythm;   ABDOMEN: Soft,       LABS:                        7.3    7.62  )-----------( 166      ( 20 Dec 2019 10:26 )             24.5     12-20    143  |  112<H>  |  23  ----------------------------<  125<H>  3.5   |  24  |  1.22    Ca    8.1<L>      20 Dec 2019 08:10  Phos  2.3     12-20  Mg     2.1     12-20    TPro  5.5<L>  /  Alb  2.2<L>  /  TBili  0.3  /  DBili  .07  /  AST  26  /  ALT  51  /  AlkPhos  65  12-20    PT/INR - ( 20 Dec 2019 08:10 )   PT: 13.2 sec;   INR: 1.17 ratio         PTT - ( 20 Dec 2019 08:10 )  PTT:26.0 sec  Urinalysis Basic - ( 19 Dec 2019 21:05 )    Color: Yellow / Appearance: Clear / S.020 / pH: x  Gluc: x / Ketone: Negative  / Bili: Negative / Urobili: Negative mg/dL   Blood: x / Protein: 100 mg/dL / Nitrite: Negative   Leuk Esterase: Negative / RBC: 0-2 /HPF / WBC 0-2   Sq Epi: x / Non Sq Epi: Occasional / Bacteria: Negative          RADIOLOGY & ADDITIONAL STUDIES:    PATHOLOGY:

## 2019-12-20 NOTE — PROGRESS NOTE ADULT - SUBJECTIVE AND OBJECTIVE BOX
Patient is a 89y old  Female who presents with a chief complaint of Weakness. (20 Dec 2019 13:25)      INTERVAL HPI / OVERNIGHT EVENTS: doing better    MEDICATIONS  (STANDING):  atorvastatin 40 milliGRAM(s) Oral at bedtime  cholecalciferol 2000 Unit(s) Oral daily  dextrose 5%. 1000 milliLiter(s) (50 mL/Hr) IV Continuous <Continuous>  dextrose 50% Injectable 12.5 Gram(s) IV Push once  dextrose 50% Injectable 25 Gram(s) IV Push once  dextrose 50% Injectable 25 Gram(s) IV Push once  insulin glargine Injectable (LANTUS) 10 Unit(s) SubCutaneous at bedtime  insulin lispro (HumaLOG) corrective regimen sliding scale   SubCutaneous three times a day before meals  levETIRAcetam  IVPB 500 milliGRAM(s) IV Intermittent every 12 hours  magnesium citrate Oral Solution 1 Bottle Oral once  piperacillin/tazobactam IVPB.. 3.375 Gram(s) IV Intermittent every 8 hours  polyethylene glycol 3350 17 Gram(s) Oral daily  senna 2 Tablet(s) Oral at bedtime    MEDICATIONS  (PRN):  acetaminophen  Suppository .. 650 milliGRAM(s) Rectal every 6 hours PRN Temp greater or equal to 38C (100.4F), Mild Pain (1 - 3)  albuterol/ipratropium for Nebulization. 3 milliLiter(s) Nebulizer every 6 hours PRN Shortness of Breath and/or Wheezing  dextrose 40% Gel 15 Gram(s) Oral once PRN Blood Glucose LESS THAN 70 milliGRAM(s)/deciliter  glucagon  Injectable 1 milliGRAM(s) IntraMuscular once PRN Glucose LESS THAN 70 milligrams/deciliter      Vital Signs Last 24 Hrs  T(C): 36.8 (20 Dec 2019 11:50), Max: 36.8 (20 Dec 2019 11:50)  T(F): 98.2 (20 Dec 2019 11:50), Max: 98.2 (20 Dec 2019 11:50)  HR: 83 (20 Dec 2019 11:50) (83 - 90)  BP: 113/66 (20 Dec 2019 11:50) (113/66 - 122/64)  BP(mean): --  RR: 17 (20 Dec 2019 11:50) (17 - 18)  SpO2: 100% (20 Dec 2019 11:50) (100% - 100%)    Review of systems:  General : no fever /chills,fatigue  CVS : no chest pain, palpitations  Lungs : no shortness of breath, cough  GI : no abdominal pain,vomiting, diarrhea   : no dysuria,hematuria        PHYSICAL EXAM:  General :NAD  Constitutional:  well-groomed, well-developed  Respiratory: CTAB/L  Cardiovascular: S1 and S2, RRR, no M/G/R  Gastrointestinal: BS+, soft, NT/ND  Extremities: No peripheral edema  Vascular: 2+ peripheral pulses  Skin: No rashes      LABS:                        7.3    7.62  )-----------( 166      ( 20 Dec 2019 10:26 )             24.5     12-    143  |  112<H>  |  23  ----------------------------<  125<H>  3.5   |  24  |  1.22    Ca    8.1<L>      20 Dec 2019 08:10  Phos  2.3     12-  Mg     2.1     -    TPro  5.5<L>  /  Alb  2.2<L>  /  TBili  0.3  /  DBili  .07  /  AST  26  /  ALT  51  /  AlkPhos  65  12-20    Urinalysis Basic - ( 19 Dec 2019 21:05 )    Color: Yellow / Appearance: Clear / S.020 / pH: x  Gluc: x / Ketone: Negative  / Bili: Negative / Urobili: Negative mg/dL   Blood: x / Protein: 100 mg/dL / Nitrite: Negative   Leuk Esterase: Negative / RBC: 0-2 /HPF / WBC 0-2   Sq Epi: x / Non Sq Epi: Occasional / Bacteria: Negative          MICROBIOLOGY:  RECENT CULTURES:   .Urine Catheterized XXXX XXXX   No growth     .Blood Blood XXXX XXXX   No growth to date.          RADIOLOGY & ADDITIONAL STUDIES:

## 2019-12-21 LAB
ALBUMIN SERPL ELPH-MCNC: 2.3 G/DL — LOW (ref 3.3–5)
ALP SERPL-CCNC: 64 U/L — SIGNIFICANT CHANGE UP (ref 40–120)
ALT FLD-CCNC: 46 U/L — SIGNIFICANT CHANGE UP (ref 12–78)
ANION GAP SERPL CALC-SCNC: 9 MMOL/L — SIGNIFICANT CHANGE UP (ref 5–17)
AST SERPL-CCNC: 26 U/L — SIGNIFICANT CHANGE UP (ref 15–37)
BILIRUB DIRECT SERPL-MCNC: 0.13 MG/DL — SIGNIFICANT CHANGE UP (ref 0.05–0.2)
BILIRUB INDIRECT FLD-MCNC: 0.4 MG/DL — SIGNIFICANT CHANGE UP (ref 0.2–1)
BILIRUB SERPL-MCNC: 0.5 MG/DL — SIGNIFICANT CHANGE UP (ref 0.2–1.2)
BUN SERPL-MCNC: 22 MG/DL — SIGNIFICANT CHANGE UP (ref 7–23)
CALCIUM SERPL-MCNC: 8.1 MG/DL — LOW (ref 8.5–10.1)
CHLORIDE SERPL-SCNC: 112 MMOL/L — HIGH (ref 96–108)
CO2 SERPL-SCNC: 24 MMOL/L — SIGNIFICANT CHANGE UP (ref 22–31)
CREAT SERPL-MCNC: 1.2 MG/DL — SIGNIFICANT CHANGE UP (ref 0.5–1.3)
GLUCOSE BLDC GLUCOMTR-MCNC: 118 MG/DL — HIGH (ref 70–99)
GLUCOSE BLDC GLUCOMTR-MCNC: 160 MG/DL — HIGH (ref 70–99)
GLUCOSE BLDC GLUCOMTR-MCNC: 160 MG/DL — HIGH (ref 70–99)
GLUCOSE SERPL-MCNC: 97 MG/DL — SIGNIFICANT CHANGE UP (ref 70–99)
HCT VFR BLD CALC: 25.6 % — LOW (ref 34.5–45)
HGB BLD-MCNC: 7.7 G/DL — LOW (ref 11.5–15.5)
LDH SERPL L TO P-CCNC: 435 U/L — HIGH (ref 50–242)
MAGNESIUM SERPL-MCNC: 2.3 MG/DL — SIGNIFICANT CHANGE UP (ref 1.6–2.6)
MCHC RBC-ENTMCNC: 24.8 PG — LOW (ref 27–34)
MCHC RBC-ENTMCNC: 30.1 GM/DL — LOW (ref 32–36)
MCV RBC AUTO: 82.3 FL — SIGNIFICANT CHANGE UP (ref 80–100)
NRBC # BLD: 0 /100 WBCS — SIGNIFICANT CHANGE UP (ref 0–0)
PHOSPHATE SERPL-MCNC: 2.6 MG/DL — SIGNIFICANT CHANGE UP (ref 2.5–4.5)
PLATELET # BLD AUTO: 183 K/UL — SIGNIFICANT CHANGE UP (ref 150–400)
POTASSIUM SERPL-MCNC: 3.6 MMOL/L — SIGNIFICANT CHANGE UP (ref 3.5–5.3)
POTASSIUM SERPL-SCNC: 3.6 MMOL/L — SIGNIFICANT CHANGE UP (ref 3.5–5.3)
PROT SERPL-MCNC: 5.8 GM/DL — LOW (ref 6–8.3)
RBC # BLD: 3.11 M/UL — LOW (ref 3.8–5.2)
RBC # FLD: 16.3 % — HIGH (ref 10.3–14.5)
SODIUM SERPL-SCNC: 145 MMOL/L — SIGNIFICANT CHANGE UP (ref 135–145)
WBC # BLD: 8.35 K/UL — SIGNIFICANT CHANGE UP (ref 3.8–10.5)
WBC # FLD AUTO: 8.35 K/UL — SIGNIFICANT CHANGE UP (ref 3.8–10.5)

## 2019-12-21 PROCEDURE — 99232 SBSQ HOSP IP/OBS MODERATE 35: CPT

## 2019-12-21 RX ORDER — LEVETIRACETAM 250 MG/1
500 TABLET, FILM COATED ORAL
Refills: 0 | Status: DISCONTINUED | OUTPATIENT
Start: 2019-12-21 | End: 2019-12-24

## 2019-12-21 RX ORDER — HEPARIN SODIUM 5000 [USP'U]/ML
5000 INJECTION INTRAVENOUS; SUBCUTANEOUS EVERY 12 HOURS
Refills: 0 | Status: DISCONTINUED | OUTPATIENT
Start: 2019-12-21 | End: 2019-12-24

## 2019-12-21 RX ADMIN — LEVETIRACETAM 420 MILLIGRAM(S): 250 TABLET, FILM COATED ORAL at 05:44

## 2019-12-21 RX ADMIN — Medication 1: at 11:34

## 2019-12-21 RX ADMIN — POLYETHYLENE GLYCOL 3350 17 GRAM(S): 17 POWDER, FOR SOLUTION ORAL at 11:37

## 2019-12-21 RX ADMIN — HEPARIN SODIUM 5000 UNIT(S): 5000 INJECTION INTRAVENOUS; SUBCUTANEOUS at 18:26

## 2019-12-21 RX ADMIN — LEVETIRACETAM 500 MILLIGRAM(S): 250 TABLET, FILM COATED ORAL at 18:26

## 2019-12-21 RX ADMIN — SENNA PLUS 2 TABLET(S): 8.6 TABLET ORAL at 21:46

## 2019-12-21 RX ADMIN — ATORVASTATIN CALCIUM 40 MILLIGRAM(S): 80 TABLET, FILM COATED ORAL at 21:46

## 2019-12-21 RX ADMIN — Medication 2000 UNIT(S): at 11:37

## 2019-12-21 NOTE — PROGRESS NOTE ADULT - PROBLEM SELECTOR PLAN 4
urine culture enterococcus faecalis   wbc now wnl s/p abx course   repeat Urine Cx NGTD urine culture enterococcus faecalis from admission. wbc now wnl s/p abx course   repeat Urine Cx NGTD. Would like to stop IV Levaquin if OK with ID. urine culture enterococcus faecalis from admission. wbc now wnl s/p abx course   repeat Urine Cx NGTD. All ABX done.

## 2019-12-21 NOTE — PROGRESS NOTE ADULT - SUBJECTIVE AND OBJECTIVE BOX
pt seen and examined at bedside, pt again was unable to void overnight with bladder scan showing >400cc in bladder, pt was striaight cath.    Vital Signs Last 24 Hrs  T(C): 36.5 (21 Dec 2019 11:55), Max: 36.5 (21 Dec 2019 11:55)  T(F): 97.7 (21 Dec 2019 11:55), Max: 97.7 (21 Dec 2019 11:55)  HR: 88 (21 Dec 2019 11:55) (88 - 99)  BP: 108/66 (21 Dec 2019 11:55) (108/66 - 119/92)  BP(mean): --  RR: 17 (21 Dec 2019 11:55) (16 - 18)  SpO2: 100% (21 Dec 2019 11:55) (96% - 100%)  I&O's Summary    21 Dec 2019 07:01  -  21 Dec 2019 16:06  --------------------------------------------------------  IN: 240 mL / OUT: 0 mL / NET: 240 mL    PHYSICAL EXAM:  General: Awake, alert, NAD  CV: +S1S2 regular rate and rhythm  Lung: Respirations nonlabored  Abdomen: Soft  : no bladder tenderness or distention.      LABS:                        7.7    8.35  )-----------( 183      ( 21 Dec 2019 07:18 )             25.6     12-21    145  |  112<H>  |  22  ----------------------------<  97  3.6   |  24  |  1.20    Ca    8.1<L>      21 Dec 2019 07:18  Phos  2.6     12-21  Mg     2.3     12-    TPro  5.8<L>  /  Alb  2.3<L>  /  TBili  0.5  /  DBili  .13  /  AST  26  /  ALT  46  /  AlkPhos  64  12-21    PT/INR - ( 20 Dec 2019 08:10 )   PT: 13.2 sec;   INR: 1.17 ratio         PTT - ( 20 Dec 2019 08:10 )  PTT:26.0 sec  Urinalysis Basic - ( 19 Dec 2019 21:05 )    Color: Yellow / Appearance: Clear / S.020 / pH: x  Gluc: x / Ketone: Negative  / Bili: Negative / Urobili: Negative mg/dL   Blood: x / Protein: 100 mg/dL / Nitrite: Negative   Leuk Esterase: Negative / RBC: 0-2 /HPF / WBC 0-2   Sq Epi: x / Non Sq Epi: Occasional / Bacteria: Negative

## 2019-12-21 NOTE — PROGRESS NOTE ADULT - SUBJECTIVE AND OBJECTIVE BOX
INTERVAL History:  Weak  Anemia  Allergies    No Known Allergies    Intolerances        MEDICATIONS  (STANDING):  atorvastatin 40 milliGRAM(s) Oral at bedtime  cholecalciferol 2000 Unit(s) Oral daily  dextrose 5%. 1000 milliLiter(s) (50 mL/Hr) IV Continuous <Continuous>  dextrose 50% Injectable 12.5 Gram(s) IV Push once  dextrose 50% Injectable 25 Gram(s) IV Push once  dextrose 50% Injectable 25 Gram(s) IV Push once  heparin  Injectable 5000 Unit(s) SubCutaneous every 12 hours  insulin lispro (HumaLOG) corrective regimen sliding scale   SubCutaneous three times a day before meals  levETIRAcetam  IVPB 500 milliGRAM(s) IV Intermittent every 12 hours  polyethylene glycol 3350 17 Gram(s) Oral daily  senna 2 Tablet(s) Oral at bedtime    MEDICATIONS  (PRN):  acetaminophen  Suppository .. 650 milliGRAM(s) Rectal every 6 hours PRN Temp greater or equal to 38C (100.4F), Mild Pain (1 - 3)  albuterol/ipratropium for Nebulization. 3 milliLiter(s) Nebulizer every 6 hours PRN Shortness of Breath and/or Wheezing  dextrose 40% Gel 15 Gram(s) Oral once PRN Blood Glucose LESS THAN 70 milliGRAM(s)/deciliter  glucagon  Injectable 1 milliGRAM(s) IntraMuscular once PRN Glucose LESS THAN 70 milligrams/deciliter      Vital Signs Last 24 Hrs  T(C): 36.4 (21 Dec 2019 05:46), Max: 36.8 (20 Dec 2019 11:50)  T(F): 97.5 (21 Dec 2019 05:46), Max: 98.2 (20 Dec 2019 11:50)  HR: 89 (21 Dec 2019 05:46) (83 - 99)  BP: 119/92 (21 Dec 2019 05:46) (109/70 - 119/92)  BP(mean): --  RR: 16 (21 Dec 2019 05:46) (16 - 18)  SpO2: 96% (21 Dec 2019 05:46) (95% - 100%)    PHYSICAL EXAM:      EYES: EOMI, PERRLA, conjunctiva and sclera clear  NECK: Supple, No JVD, Normal thyroid  CHEST/LUNG: Clear to percussion bilaterally; No rales, rhonchi,   HEART: Regular rate and rhythm;   ABDOMEN: Soft, Nontender.  LYMPH: No lymphadenopathy noted        LABS:                        7.7    8.35  )-----------( 183      ( 21 Dec 2019 07:18 )             25.6     12-    145  |  112<H>  |  22  ----------------------------<  97  3.6   |  24  |  1.20    Ca    8.1<L>      21 Dec 2019 07:18  Phos  2.6       Mg     2.3         TPro  5.8<L>  /  Alb  2.3<L>  /  TBili  0.5  /  DBili  .13  /  AST  26  /  ALT  46  /  AlkPhos  64  12-    PT/INR - ( 20 Dec 2019 08:10 )   PT: 13.2 sec;   INR: 1.17 ratio         PTT - ( 20 Dec 2019 08:10 )  PTT:26.0 sec  Urinalysis Basic - ( 19 Dec 2019 21:05 )    Color: Yellow / Appearance: Clear / S.020 / pH: x  Gluc: x / Ketone: Negative  / Bili: Negative / Urobili: Negative mg/dL   Blood: x / Protein: 100 mg/dL / Nitrite: Negative   Leuk Esterase: Negative / RBC: 0-2 /HPF / WBC 0-2   Sq Epi: x / Non Sq Epi: Occasional / Bacteria: Negative          RADIOLOGY & ADDITIONAL STUDIES:    PATHOLOGY:

## 2019-12-21 NOTE — PROGRESS NOTE ADULT - PROBLEM SELECTOR PLAN 3
resolved likely related to UTI, hypercalcemia, dehydration, IV fluids, cultures in the presence of dementia and old cva   per daughters she is close to baseline resolved likely related to UTI, hypercalcemia, dehydration, IV fluids, cultures in the presence of dementia and old cva. per daughters she is close to baseline resolved likely related to UTI, hypercalcemia, dehydration, IV fluids stopped, cultures in the presence of dementia and old cva. per daughters she is close to baseline.

## 2019-12-21 NOTE — PROGRESS NOTE ADULT - PROBLEM SELECTOR PLAN 8
echo as above   assumed chronic with an acute exacerbation during hospital due to ivf   resolved s/p diuretic   continue  diuretic prn echo as above, assumed chronic with an acute exacerbation during hospital due to ivf.  resolved s/p diuretic. continue  diuretic prn

## 2019-12-21 NOTE — PROGRESS NOTE ADULT - SUBJECTIVE AND OBJECTIVE BOX
INTERVAL HPI/OVERNIGHT EVENTS:  Pt seen and examined at bedside.     Allergies/Intolerance: No Known Allergies      MEDICATIONS  (STANDING):  atorvastatin 40 milliGRAM(s) Oral at bedtime  cholecalciferol 2000 Unit(s) Oral daily  dextrose 5%. 1000 milliLiter(s) (50 mL/Hr) IV Continuous <Continuous>  dextrose 50% Injectable 12.5 Gram(s) IV Push once  dextrose 50% Injectable 25 Gram(s) IV Push once  dextrose 50% Injectable 25 Gram(s) IV Push once  heparin  Injectable 5000 Unit(s) SubCutaneous every 12 hours  insulin lispro (HumaLOG) corrective regimen sliding scale   SubCutaneous three times a day before meals  levETIRAcetam  IVPB 500 milliGRAM(s) IV Intermittent every 12 hours  polyethylene glycol 3350 17 Gram(s) Oral daily  senna 2 Tablet(s) Oral at bedtime    MEDICATIONS  (PRN):  acetaminophen  Suppository .. 650 milliGRAM(s) Rectal every 6 hours PRN Temp greater or equal to 38C (100.4F), Mild Pain (1 - 3)  albuterol/ipratropium for Nebulization. 3 milliLiter(s) Nebulizer every 6 hours PRN Shortness of Breath and/or Wheezing  dextrose 40% Gel 15 Gram(s) Oral once PRN Blood Glucose LESS THAN 70 milliGRAM(s)/deciliter  glucagon  Injectable 1 milliGRAM(s) IntraMuscular once PRN Glucose LESS THAN 70 milligrams/deciliter        ROS: all systems reviewed and wnl      PHYSICAL EXAMINATION:  Vital Signs Last 24 Hrs  T(C): 36.4 (21 Dec 2019 05:46), Max: 36.8 (20 Dec 2019 11:50)  T(F): 97.5 (21 Dec 2019 05:46), Max: 98.2 (20 Dec 2019 11:50)  HR: 89 (21 Dec 2019 05:46) (83 - 99)  BP: 119/92 (21 Dec 2019 05:46) (109/70 - 119/92)  BP(mean): --  RR: 16 (21 Dec 2019 05:46) (16 - 18)  SpO2: 96% (21 Dec 2019 05:46) (95% - 100%)  CAPILLARY BLOOD GLUCOSE      POCT Blood Glucose.: 90 mg/dL (21 Dec 2019 07:18)  POCT Blood Glucose.: 114 mg/dL (20 Dec 2019 22:08)  POCT Blood Glucose.: 108 mg/dL (20 Dec 2019 15:40)        GENERAL:   NECK: supple, No JVD  CHEST/LUNG: clear to auscultation bilaterally; no rales, rhonchi, or wheezing b/l  HEART: normal S1, S2  ABDOMEN: BS+, soft, ND, NT   EXTREMITIES:  pulses palpable; no clubbing, cyanosis, or edema b/l LEs  SKIN: no rashes or lesions      LABS:                        7.7    8.35  )-----------( 183      ( 21 Dec 2019 07:18 )             25.6     12-21    145  |  112<H>  |  22  ----------------------------<  97  3.6   |  24  |  1.20    Ca    8.1<L>      21 Dec 2019 07:18  Phos  2.6     12-  Mg     2.3     12-    TPro  5.8<L>  /  Alb  2.3<L>  /  TBili  0.5  /  DBili  .13  /  AST  26  /  ALT  46  /  AlkPhos  64  12-21    PT/INR - ( 20 Dec 2019 08:10 )   PT: 13.2 sec;   INR: 1.17 ratio         PTT - ( 20 Dec 2019 08:10 )  PTT:26.0 sec  Urinalysis Basic - ( 19 Dec 2019 21:05 )    Color: Yellow / Appearance: Clear / S.020 / pH: x  Gluc: x / Ketone: Negative  / Bili: Negative / Urobili: Negative mg/dL   Blood: x / Protein: 100 mg/dL / Nitrite: Negative   Leuk Esterase: Negative / RBC: 0-2 /HPF / WBC 0-2   Sq Epi: x / Non Sq Epi: Occasional / Bacteria: Negative INTERVAL HPI/OVERNIGHT EVENTS:  Pt seen and examined at bedside.     Allergies/Intolerance: No Known Allergies      MEDICATIONS  (STANDING):  atorvastatin 40 milliGRAM(s) Oral at bedtime  cholecalciferol 2000 Unit(s) Oral daily  dextrose 5%. 1000 milliLiter(s) (50 mL/Hr) IV Continuous <Continuous>  dextrose 50% Injectable 12.5 Gram(s) IV Push once  dextrose 50% Injectable 25 Gram(s) IV Push once  dextrose 50% Injectable 25 Gram(s) IV Push once  heparin  Injectable 5000 Unit(s) SubCutaneous every 12 hours  insulin lispro (HumaLOG) corrective regimen sliding scale   SubCutaneous three times a day before meals  levETIRAcetam  IVPB 500 milliGRAM(s) IV Intermittent every 12 hours  polyethylene glycol 3350 17 Gram(s) Oral daily  senna 2 Tablet(s) Oral at bedtime    MEDICATIONS  (PRN):  acetaminophen  Suppository .. 650 milliGRAM(s) Rectal every 6 hours PRN Temp greater or equal to 38C (100.4F), Mild Pain (1 - 3)  albuterol/ipratropium for Nebulization. 3 milliLiter(s) Nebulizer every 6 hours PRN Shortness of Breath and/or Wheezing  dextrose 40% Gel 15 Gram(s) Oral once PRN Blood Glucose LESS THAN 70 milliGRAM(s)/deciliter  glucagon  Injectable 1 milliGRAM(s) IntraMuscular once PRN Glucose LESS THAN 70 milligrams/deciliter        ROS: all systems reviewed and wnl      PHYSICAL EXAMINATION:  Vital Signs Last 24 Hrs  T(C): 36.4 (21 Dec 2019 05:46), Max: 36.8 (20 Dec 2019 11:50)  T(F): 97.5 (21 Dec 2019 05:46), Max: 98.2 (20 Dec 2019 11:50)  HR: 89 (21 Dec 2019 05:46) (83 - 99)  BP: 119/92 (21 Dec 2019 05:46) (109/70 - 119/92)  BP(mean): --  RR: 16 (21 Dec 2019 05:46) (16 - 18)  SpO2: 96% (21 Dec 2019 05:46) (95% - 100%)  CAPILLARY BLOOD GLUCOSE      POCT Blood Glucose.: 90 mg/dL (21 Dec 2019 07:18)  POCT Blood Glucose.: 114 mg/dL (20 Dec 2019 22:08)  POCT Blood Glucose.: 108 mg/dL (20 Dec 2019 15:40)        GENERAL: in bed, family at bedside, no fevers, de jesus removed, PVR acceptable  NECK: supple, No JVD  CHEST/LUNG: clear to auscultation bilaterally; no rales, rhonchi, or wheezing b/l  HEART: normal S1, S2  ABDOMEN: BS+, soft, ND, NT   EXTREMITIES:  pulses palpable; no clubbing, cyanosis, or edema b/l LEs  SKIN: no rashes or lesions      LABS:                        7.7    8.35  )-----------( 183      ( 21 Dec 2019 07:18 )             25.6     12-21    145  |  112<H>  |  22  ----------------------------<  97  3.6   |  24  |  1.20    Ca    8.1<L>      21 Dec 2019 07:18  Phos  2.6     12-21  Mg     2.3     12-21    TPro  5.8<L>  /  Alb  2.3<L>  /  TBili  0.5  /  DBili  .13  /  AST  26  /  ALT  46  /  AlkPhos  64  12-21    PT/INR - ( 20 Dec 2019 08:10 )   PT: 13.2 sec;   INR: 1.17 ratio         PTT - ( 20 Dec 2019 08:10 )  PTT:26.0 sec  Urinalysis Basic - ( 19 Dec 2019 21:05 )    Color: Yellow / Appearance: Clear / S.020 / pH: x  Gluc: x / Ketone: Negative  / Bili: Negative / Urobili: Negative mg/dL   Blood: x / Protein: 100 mg/dL / Nitrite: Negative   Leuk Esterase: Negative / RBC: 0-2 /HPF / WBC 0-2   Sq Epi: x / Non Sq Epi: Occasional / Bacteria: Negative

## 2019-12-21 NOTE — PROGRESS NOTE ADULT - PROBLEM SELECTOR PLAN 2
fevers resolved on zosyn   presumed gram neg/anaerobic/gram positive/aspiration  PNA  Blood Cx and Urine Cx NGTD   completed 5 days of Zosyn, per ID OK to d/c fevers resolved on zosyn. presumed gram neg/anaerobic/gram positive/aspiration  PNA. Blood Cx and Urine Cx NGTD   completed 5 days of Zosyn, per ID OK to d/c. fevers resolved on zosyn. presumed gram neg/anaerobic/gram positive/aspiration  PNA. Blood Cx and Urine Cx NGTD. completed 5 days of Zosyn for pneumonia. fevers resolved on zosyn. presumed gram neg/anaerobic/gram positive/aspiration  PNA. Blood Cx and Urine Cx NGTD. completed 5 days of Zosyn for pneumonia.  All ABX completed.

## 2019-12-21 NOTE — PROGRESS NOTE ADULT - ASSESSMENT
89 y/o Female PMH of HTN, DM2, HL, seizure d/o, gout presents to ED for eval of increasing weakness and change in mental status over the past few weeks.  Family states some days pt is at her baseline, then other times she is more lethargic, noted drooling.  Family states pt taken to outside hospital twice for sx, work up neg & dc home.  Family states pt has been compliant w her meds, no known grand mal seizures.  No fall/head trauma.  Pt currently denies pain, denies all complaints; she is poor historian, on memantine, possible dementia. She is clinically dehydrated with elevated creatinine, has hypercalcemia, likely metabolic/septic encephalopathy.    Pt was initially admitted with a probable UTI ,Acute urinary retention and MARGARET and de jesus was placed and was started on IV Rocephin ,Urine c/s later grew enterococcus . AS leukocytosis was already resolving  the antibiotics was not changed .Pt also was getting azithro for a possible pneumonia   CT abdo abd Chest was performed . She was found to have a suspicious lesion on stomach and underwent EGD.pt is having fever since EGD as well .New blood and urine c/s has been sent .She is awake but denies any c/o . antibiotics have been switched to Zosyn.     Dispo: SO CARD done       #DM2, A1c 7.7%  continue with ISS,  d/c lantus for now and monitor FS as they are on low side   FS goal 140-180    #Hypophosphatemia --replete and monitor     #Vitamin D insufficiency --supplement 89 y/o Female PMH of HTN, DM2, HL, seizure d/o, gout presents to ED for eval of increasing weakness and change in mental status over the past few weeks.  Family states some days pt is at her baseline, then other times she is more lethargic, noted drooling.  Family states pt taken to outside hospital twice for sx, work up neg & dc home.  Family states pt has been compliant w her meds, no known grand mal seizures.  No fall/head trauma.  Pt currently denies pain, denies all complaints; she is poor historian, on memantine, possible dementia. She is clinically dehydrated with elevated creatinine, has hypercalcemia, likely metabolic/septic encephalopathy.    Pt was initially admitted with a probable UTI ,Acute urinary retention and MARGARET and de jesus was placed and was started on IV Rocephin ,Urine c/s later grew enterococcus . AS leukocytosis was already resolving  the antibiotics was not changed .Pt also was getting azithro for a possible pneumonia   CT abdo abd Chest was performed . She was found to have a suspicious lesion on stomach and underwent EGD.pt is having fever since EGD as well .New blood and urine c/s has been sent .She is awake but denies any c/o . antibiotics have been switched to Zosyn. Now on IV Levaquin 500 mg bid per ID.     Dispo: SO CARD done       #DM2, A1c 7.7%  continue with ISS,  d/c lantus for now and monitor FS as they are on low side   FS goal 140-180    #Hypophosphatemia --replete and monitor     #Vitamin D insufficiency --supplement 89 y/o Female PMH of HTN, DM2, HL, seizure d/o, gout presents to ED for eval of increasing weakness and change in mental status over the past few weeks.  Family states some days pt is at her baseline, then other times she is more lethargic, noted drooling.  Family states pt taken to outside hospital twice for sx, work up neg & dc home.  Family states pt has been compliant w her meds, no known grand mal seizures.  No fall/head trauma.  Pt currently denies pain, denies all complaints; she is poor historian, on memantine, possible dementia. She is clinically dehydrated with elevated creatinine, has hypercalcemia, likely metabolic/septic encephalopathy.    Pt was initially admitted with a probable UTI ,Acute urinary retention and MARGARET and de jesus was placed and was started on IV Rocephin ,Urine c/s later grew enterococcus . AS leukocytosis was already resolving  the antibiotics was not changed .Pt also was getting azithro for a possible pneumonia   CT abdo abd Chest was performed . She was found to have a suspicious lesion on stomach and underwent EGD.pt is having fever since EGD as well .New blood and urine c/s has been sent .She is awake but denies any c/o . antibiotics have been switched to Zosyn. All ABX stopped.      Dispo: SO CARD done       #DM2, A1c 7.7%  continue with ISS,  d/c lantus for now and monitor FS as they are on low side   FS goal 140-180    #Hypophosphatemia --replete and monitor     #Vitamin D insufficiency --supplement

## 2019-12-21 NOTE — PROGRESS NOTE ADULT - PROBLEM SELECTOR PLAN 1
associated with anemia unspecified possible lymphoproliferative disorder   PTH low ,   PTHRP wnl  vitamin d level wnl ( low normal)  ruled out hyperthyroid check tsh wnl  free t4 wnl   spep and upep noted: no m spike ,  no bence israel protein    endocrine consult reviewed and appreciated s/p Aredia on 12/11/19 12/13/19 obtained ct chest abd pelvis ? lobular stomach therefore consulted gi s/p  egd on Monday 12/16/19 c/w gastritis no mass    BM Bx done Friday 12/20/19 by IR , discussed with daughters at bedside associated with anemia unspecified possible lymphoproliferative disorder   PTH low, PTHRP wnl, vitamin d level wnl ( low normal)  ruled out hyperthyroid check tsh wnl  free t4 wnl   spep and upep noted: no m spike, no bence israel protein    endocrine consult reviewed and appreciated s/p Aredia on 12/11/19 12/13/19 obtained ct chest abd pelvis ? lobular stomach therefore consulted gi s/p  egd on Monday 12/16/19 c/w gastritis no mass    BM Bx done Friday 12/20/19 by IR , discussed with daughters at bedside associated with anemia unspecified possible lymphoproliferative disorder   PTH low, PTHRP wnl, vitamin d level wnl ( low normal). ruled out hyperthyroid check tsh wnl  free t4 wnl. Bone marrow biopsy done. spep and upep noted: no m spike, no bence israel protein. endocrine consult reviewed and appreciated s/p Aredia on 12/11/19, Calcium normalized today. 12/13/19 obtained ct chest abd pelvis ? lobular stomach therefore consulted gi s/p  egd on Monday 12/16/19 c/w gastritis no mass. BM Bx done Friday 12/20/19 by IR , discussed with daughters at bedside

## 2019-12-21 NOTE — PROGRESS NOTE ADULT - ASSESSMENT
89F with UR possibly due to neurogenic bladder, pt had De Jesus removed and was unable to urinate now x3, requiring straightcath  Plan:  - f/u void, if pt unable to urinate de jesus shoud be inserted and pt can be discharged with catheter and outpt urology follow-up  - continue medical management and supportive care

## 2019-12-21 NOTE — PROGRESS NOTE ADULT - PROBLEM SELECTOR PLAN 7
improving s/p de jesus on 12/11/19   renal images unremarkable    urine cx enterococcus faecalis   appreciate urology note  s/p de jesus removal 12/18/19, PVR 0 x 2 improving s/p de jesus on 12/11/19, monitor PVR.

## 2019-12-21 NOTE — PROGRESS NOTE ADULT - SUBJECTIVE AND OBJECTIVE BOX
Patient is a 89y old  Female who presents with a chief complaint of Weakness. (21 Dec 2019 12:04)      Interval History: finger sticks are stable   letargic and serum Calcium is 8.1 but albumin is low   status post Aredia     MEDICATIONS  (STANDING):  atorvastatin 40 milliGRAM(s) Oral at bedtime  cholecalciferol 2000 Unit(s) Oral daily  dextrose 5%. 1000 milliLiter(s) (50 mL/Hr) IV Continuous <Continuous>  dextrose 50% Injectable 12.5 Gram(s) IV Push once  dextrose 50% Injectable 25 Gram(s) IV Push once  dextrose 50% Injectable 25 Gram(s) IV Push once  heparin  Injectable 5000 Unit(s) SubCutaneous every 12 hours  insulin lispro (HumaLOG) corrective regimen sliding scale   SubCutaneous three times a day before meals  levETIRAcetam 500 milliGRAM(s) Oral two times a day  polyethylene glycol 3350 17 Gram(s) Oral daily  senna 2 Tablet(s) Oral at bedtime    MEDICATIONS  (PRN):  acetaminophen  Suppository .. 650 milliGRAM(s) Rectal every 6 hours PRN Temp greater or equal to 38C (100.4F), Mild Pain (1 - 3)  dextrose 40% Gel 15 Gram(s) Oral once PRN Blood Glucose LESS THAN 70 milliGRAM(s)/deciliter  glucagon  Injectable 1 milliGRAM(s) IntraMuscular once PRN Glucose LESS THAN 70 milligrams/deciliter      Allergies    No Known Allergies    Intolerances        REVIEW OF SYSTEMS:  CONSTITUTIONAL: no changes  EYES: No eye pain, visual disturbances, or discharge  ENMT:  No difficulty hearing, No sinus or throat pain  NECK: No pain or stiffness  RESPIRATORY: No cough, wheezing, chills or hemoptysis; No shortness of breath  CARDIOVASCULAR: No chest pain, palpitations or leg swelling  GASTROINTESTINAL: No abdominal or epigastric pain. No nausea, vomiting, or hematemesis; No diarrhea or constipation. No melena or hematochezia.  GENITOURINARY: No dysuria, frequency, hematuria, or incontinence  NEUROLOGICAL: No headaches, memory loss, loss of strength, numbness, or tremors  SKIN: No itching, burning, rashes, or lesions   ENDOCRINE: No heat or cold intolerance; No hair loss  MUSCULOSKELETAL: No joint pain or swelling; No muscle, back, or extremity pain  PSYCHIATRIC: No depression, anxiety, mood swings, or difficulty sleeping  HEME/LYMPH: No easy bruising, or bleeding gums  ALLERY AND IMMUNOLOGIC: No hives or eczema    Vital Signs Last 24 Hrs  T(C): 36.3 (21 Dec 2019 17:10), Max: 36.5 (21 Dec 2019 11:55)  T(F): 97.3 (21 Dec 2019 17:10), Max: 97.7 (21 Dec 2019 11:55)  HR: 89 (21 Dec 2019 17:10) (88 - 98)  BP: 128/58 (21 Dec 2019 17:10) (108/66 - 128/58)  BP(mean): --  RR: 18 (21 Dec 2019 17:10) (16 - 18)  SpO2: 100% (21 Dec 2019 17:10) (96% - 100%)    PHYSICAL EXAM:  GENERAL:   HEAD: Atraumatic, Normocephalic  EYES: PERRLA, conjunctiva and sclera clear  ENMT: No tonsillar erythema, exudates, or enlargement; Moist mucous membranes, Good dentition, No lesions  NECK: Supple, No JVD, Normal thyroid  NERVOUS SYSTEM:  Alert & Oriented X3, Good concentration; Motor Strength 5/5 B/L upper and lower extremities  CHEST/LUNG: Clear to auscultaion bilaterally; No rales, rhonchi, wheezing, or rubs  HEART: Regular rate and rhythm; No murmurs, rubs, or gallops  ABDOMEN: Soft, Nontender, Nondistended; Bowel sounds present  EXTREMITIES:  2+ Peripheral Pulses, no edema  SKIN: No rashes or lesions    LABS:    PT/INR - ( 20 Dec 2019 08:10 )   PT: 13.2 sec;   INR: 1.17 ratio         PTT - ( 20 Dec 2019 08:10 )  PTT:26.0 sec  Urinalysis Basic - ( 19 Dec 2019 21:05 )    Color: Yellow / Appearance: Clear / S.020 / pH: x  Gluc: x / Ketone: Negative  / Bili: Negative / Urobili: Negative mg/dL   Blood: x / Protein: 100 mg/dL / Nitrite: Negative   Leuk Esterase: Negative / RBC: 0-2 /HPF / WBC 0-2   Sq Epi: x / Non Sq Epi: Occasional / Bacteria: Negative      CAPILLARY BLOOD GLUCOSE      POCT Blood Glucose.: 118 mg/dL (21 Dec 2019 16:48)  POCT Blood Glucose.: 160 mg/dL (21 Dec 2019 10:47)  POCT Blood Glucose.: 90 mg/dL (21 Dec 2019 07:18)  POCT Blood Glucose.: 114 mg/dL (20 Dec 2019 22:08)    Lipid panel:           Thyroid:  Diabetes Tests:  Parathyroid Panel:  Adrenals:  RADIOLOGY & ADDITIONAL TESTS:    Imaging Personally Reviewed:  [ ] YES  [ ] NO    Consultant(s) Notes Reviewed:  [ ] YES  [ ] NO    Care Discussed with Consultants/Other Providers [ ] YES  [ ] NO

## 2019-12-22 LAB
CULTURE RESULTS: SIGNIFICANT CHANGE UP
GLUCOSE BLDC GLUCOMTR-MCNC: 128 MG/DL — HIGH (ref 70–99)
GLUCOSE BLDC GLUCOMTR-MCNC: 131 MG/DL — HIGH (ref 70–99)
GLUCOSE BLDC GLUCOMTR-MCNC: 143 MG/DL — HIGH (ref 70–99)
GLUCOSE BLDC GLUCOMTR-MCNC: 172 MG/DL — HIGH (ref 70–99)
OB PNL STL: NEGATIVE — SIGNIFICANT CHANGE UP
SPECIMEN SOURCE: SIGNIFICANT CHANGE UP

## 2019-12-22 PROCEDURE — 99232 SBSQ HOSP IP/OBS MODERATE 35: CPT

## 2019-12-22 RX ORDER — ALLOPURINOL 300 MG
100 TABLET ORAL
Refills: 0 | Status: DISCONTINUED | OUTPATIENT
Start: 2019-12-22 | End: 2019-12-24

## 2019-12-22 RX ADMIN — LEVETIRACETAM 500 MILLIGRAM(S): 250 TABLET, FILM COATED ORAL at 17:24

## 2019-12-22 RX ADMIN — Medication 2000 UNIT(S): at 11:36

## 2019-12-22 RX ADMIN — HEPARIN SODIUM 5000 UNIT(S): 5000 INJECTION INTRAVENOUS; SUBCUTANEOUS at 05:36

## 2019-12-22 RX ADMIN — Medication 1: at 11:34

## 2019-12-22 RX ADMIN — SENNA PLUS 2 TABLET(S): 8.6 TABLET ORAL at 21:39

## 2019-12-22 RX ADMIN — Medication 100 MILLIGRAM(S): at 17:24

## 2019-12-22 RX ADMIN — POLYETHYLENE GLYCOL 3350 17 GRAM(S): 17 POWDER, FOR SOLUTION ORAL at 11:36

## 2019-12-22 RX ADMIN — ATORVASTATIN CALCIUM 40 MILLIGRAM(S): 80 TABLET, FILM COATED ORAL at 21:39

## 2019-12-22 RX ADMIN — HEPARIN SODIUM 5000 UNIT(S): 5000 INJECTION INTRAVENOUS; SUBCUTANEOUS at 17:24

## 2019-12-22 RX ADMIN — LEVETIRACETAM 500 MILLIGRAM(S): 250 TABLET, FILM COATED ORAL at 05:36

## 2019-12-22 NOTE — PROGRESS NOTE ADULT - PROBLEM SELECTOR PLAN 1
associated with anemia unspecified possible lymphoproliferative disorder   PTH low, PTHRP wnl, vitamin d level wnl ( low normal). ruled out hyperthyroid check tsh wnl  free t4 wnl. Bone marrow biopsy done. spep and upep noted: no m spike, no bence israel protein. endocrine consult reviewed and appreciated s/p Aredia on 12/11/19, Calcium normalized today. 12/13/19 obtained ct chest abd pelvis ? lobular stomach therefore consulted gi s/p  egd on Monday 12/16/19 c/w gastritis no mass. BM Bx done Friday 12/20/19 by IR , discussed with daughters at bedside associated with anemia unspecified possible lymphoproliferative disorder   PTH low, PTHRP wnl, vitamin d level wnl ( low normal). ruled out hyperthyroid check tsh wnl  free t4 wnl. Bone marrow biopsy done. spep and upep noted: no m spike, no bence israel protein. endocrine consult reviewed and appreciated s/p Aredia on 12/11/19, Calcium normalized today. 12/13/19 obtained ct chest abd pelvis ? lobular stomach therefore consulted gi s/p  egd on Monday 12/16/19 c/w gastritis no mass. BM Bx done Friday 12/20/19 by IR , discussed with daughters at bedside.  Family awaiting bone marrow biopsy.

## 2019-12-22 NOTE — PROGRESS NOTE ADULT - SUBJECTIVE AND OBJECTIVE BOX
INTERVAL History:  Weak    Allergies    No Known Allergies    Intolerances        MEDICATIONS  (STANDING):  atorvastatin 40 milliGRAM(s) Oral at bedtime  cholecalciferol 2000 Unit(s) Oral daily  dextrose 5%. 1000 milliLiter(s) (50 mL/Hr) IV Continuous <Continuous>  dextrose 50% Injectable 12.5 Gram(s) IV Push once  dextrose 50% Injectable 25 Gram(s) IV Push once  dextrose 50% Injectable 25 Gram(s) IV Push once  heparin  Injectable 5000 Unit(s) SubCutaneous every 12 hours  insulin lispro (HumaLOG) corrective regimen sliding scale   SubCutaneous three times a day before meals  levETIRAcetam 500 milliGRAM(s) Oral two times a day  polyethylene glycol 3350 17 Gram(s) Oral daily  senna 2 Tablet(s) Oral at bedtime    MEDICATIONS  (PRN):  acetaminophen  Suppository .. 650 milliGRAM(s) Rectal every 6 hours PRN Temp greater or equal to 38C (100.4F), Mild Pain (1 - 3)  dextrose 40% Gel 15 Gram(s) Oral once PRN Blood Glucose LESS THAN 70 milliGRAM(s)/deciliter  glucagon  Injectable 1 milliGRAM(s) IntraMuscular once PRN Glucose LESS THAN 70 milligrams/deciliter      Vital Signs Last 24 Hrs  T(C): 36.8 (22 Dec 2019 04:57), Max: 36.9 (21 Dec 2019 23:29)  T(F): 98.2 (22 Dec 2019 04:57), Max: 98.4 (21 Dec 2019 23:29)  HR: 87 (22 Dec 2019 04:57) (87 - 98)  BP: 128/64 (22 Dec 2019 04:57) (108/66 - 128/64)  BP(mean): --  RR: 18 (22 Dec 2019 04:57) (17 - 18)  SpO2: 100% (22 Dec 2019 04:57) (100% - 100%)    PHYSICAL EXAM:    Pallor  EYES: EOMI, PERRLA, conjunctiva and sclera clear  NECK: Supple, No JVD, Normal thyroid  CHEST/LUNG: Clear to percussion bilaterally; No rales, rhonchi,   HEART: Regular rate and rhythm;   ABDOMEN: Soft, Nontender.  LYMPH: No lymphadenopathy noted        LABS:                        7.7    8.35  )-----------( 183      ( 21 Dec 2019 07:18 )             25.6     12-21    145  |  112<H>  |  22  ----------------------------<  97  3.6   |  24  |  1.20    Ca    8.1<L>      21 Dec 2019 07:18  Phos  2.6     12-21  Mg     2.3     12-21    TPro  5.8<L>  /  Alb  2.3<L>  /  TBili  0.5  /  DBili  .13  /  AST  26  /  ALT  46  /  AlkPhos  64  12-21            RADIOLOGY & ADDITIONAL STUDIES:    PATHOLOGY:

## 2019-12-22 NOTE — PROGRESS NOTE ADULT - PROBLEM SELECTOR PLAN 2
Humoral Hypercalcemia of malignancy   current stable serum Calcium   Encourage more nutrition to increase Albumin

## 2019-12-22 NOTE — PROGRESS NOTE ADULT - PROBLEM SELECTOR PLAN 8
echo as above, assumed chronic with an acute exacerbation during hospital due to ivf.  resolved s/p diuretic. continue  diuretic prn

## 2019-12-22 NOTE — PROGRESS NOTE ADULT - PROBLEM SELECTOR PLAN 6
no old labs to compare    presumed due to acute urinary retention see below .  s/p de jesus on 12/11/19 and improving  daily , appreciate renal note    eosinophils positive no old labs to compare, s/p liza on 12/22/19 for retension.  TOV in rehab.

## 2019-12-22 NOTE — PROGRESS NOTE ADULT - PROBLEM SELECTOR PLAN 2
fevers resolved on zosyn. presumed gram neg/anaerobic/gram positive/aspiration  PNA. Blood Cx and Urine Cx NGTD. completed 5 days of Zosyn for pneumonia.  All ABX completed.

## 2019-12-22 NOTE — PROGRESS NOTE ADULT - PROBLEM SELECTOR PLAN 3
resolved likely related to UTI, hypercalcemia, dehydration, IV fluids stopped, cultures in the presence of dementia and old cva. per daughters she is close to baseline.

## 2019-12-22 NOTE — PROGRESS NOTE ADULT - ASSESSMENT
89 y/o Female PMH of HTN, DM2, HL, seizure d/o, gout presents to ED for eval of increasing weakness and change in mental status over the past few weeks.  Family states some days pt is at her baseline, then other times she is more lethargic, noted drooling.  Family states pt taken to outside hospital twice for sx, work up neg & dc home.  Family states pt has been compliant w her meds, no known grand mal seizures.  No fall/head trauma.  Pt currently denies pain, denies all complaints; she is poor historian, on memantine, possible dementia. She is clinically dehydrated with elevated creatinine, has hypercalcemia, likely metabolic/septic encephalopathy.    Pt was initially admitted with a probable UTI ,Acute urinary retention and MARGARET and de jesus was placed and was started on IV Rocephin ,Urine c/s later grew enterococcus . AS leukocytosis was already resolving  the antibiotics was not changed .Pt also was getting azithro for a possible pneumonia   CT abdo abd Chest was performed . She was found to have a suspicious lesion on stomach and underwent EGD.pt is having fever since EGD as well .New blood and urine c/s has been sent .She is awake but denies any c/o . antibiotics have been switched to Zosyn. All ABX stopped.      Dispo: SO CARD done       #DM2, A1c 7.7%  continue with ISS,  d/c lantus for now and monitor FS as they are on low side   FS goal 140-180    #Hypophosphatemia --replete and monitor     #Vitamin D insufficiency --supplement 89 y/o Female PMH of HTN, DM2, HL, seizure d/o, gout presents to ED for eval of increasing weakness and change in mental status over the past few weeks.  Family states some days pt is at her baseline, then other times she is more lethargic, noted drooling.  Family states pt taken to outside hospital twice for sx, work up neg & dc home.  Family states pt has been compliant w her meds, no known grand mal seizures.  No fall/head trauma.  Pt currently denies pain, denies all complaints; she is poor historian, on memantine, possible dementia. She is clinically dehydrated with elevated creatinine, has hypercalcemia, likely metabolic/septic encephalopathy.    Pt was initially admitted with a probable UTI ,Acute urinary retention and MARGARET and de jesus was placed and was started on IV Rocephin ,Urine c/s later grew enterococcus . AS leukocytosis was already resolving  the antibiotics was not changed .Pt also was getting azithro for a possible pneumonia   CT abdo abd Chest was performed . She was found to have a suspicious lesion on stomach and underwent EGD.pt is having fever since EGD as well .New blood and urine c/s has been sent .She is awake but denies any c/o . antibiotics have been switched to Zosyn. All ABX stopped.      Dispo: SO CARD done       #DM2, A1c 7.7%  continue with ISS,  d/c lantus for now and monitor FS as they are on low side  Stable 131-160.   FS goal 140-180    #Hypophosphatemia --replete and monitor     #Vitamin D insufficiency --supplement

## 2019-12-22 NOTE — PROGRESS NOTE ADULT - SUBJECTIVE AND OBJECTIVE BOX
Patient is a 89y old  Female who presents with a chief complaint of Weakness. (22 Dec 2019 10:11)      Interval History: clinical status same and also serum Calcium is at 7.9 but with low albumin     MEDICATIONS  (STANDING):  allopurinol 100 milliGRAM(s) Oral two times a day  atorvastatin 40 milliGRAM(s) Oral at bedtime  cholecalciferol 2000 Unit(s) Oral daily  dextrose 5%. 1000 milliLiter(s) (50 mL/Hr) IV Continuous <Continuous>  dextrose 50% Injectable 12.5 Gram(s) IV Push once  dextrose 50% Injectable 25 Gram(s) IV Push once  dextrose 50% Injectable 25 Gram(s) IV Push once  heparin  Injectable 5000 Unit(s) SubCutaneous every 12 hours  insulin lispro (HumaLOG) corrective regimen sliding scale   SubCutaneous three times a day before meals  levETIRAcetam 500 milliGRAM(s) Oral two times a day  polyethylene glycol 3350 17 Gram(s) Oral daily  senna 2 Tablet(s) Oral at bedtime    MEDICATIONS  (PRN):  acetaminophen  Suppository .. 650 milliGRAM(s) Rectal every 6 hours PRN Temp greater or equal to 38C (100.4F), Mild Pain (1 - 3)  dextrose 40% Gel 15 Gram(s) Oral once PRN Blood Glucose LESS THAN 70 milliGRAM(s)/deciliter  glucagon  Injectable 1 milliGRAM(s) IntraMuscular once PRN Glucose LESS THAN 70 milligrams/deciliter      Allergies    No Known Allergies    Intolerances        REVIEW OF SYSTEMS:  CONSTITUTIONAL: no changes      Vital Signs Last 24 Hrs  T(C): 37.2 (22 Dec 2019 18:21), Max: 37.2 (22 Dec 2019 18:21)  T(F): 98.9 (22 Dec 2019 18:21), Max: 98.9 (22 Dec 2019 18:21)  HR: 95 (22 Dec 2019 18:21) (87 - 99)  BP: 115/60 (22 Dec 2019 18:21) (112/58 - 128/64)  BP(mean): --  RR: 17 (22 Dec 2019 18:21) (16 - 18)  SpO2: 99% (22 Dec 2019 18:21) (99% - 100%)    PHYSICAL EXAM:  GENERAL: deferred     LABS:        CAPILLARY BLOOD GLUCOSE      POCT Blood Glucose.: 128 mg/dL (22 Dec 2019 21:34)  POCT Blood Glucose.: 143 mg/dL (22 Dec 2019 17:20)  POCT Blood Glucose.: 172 mg/dL (22 Dec 2019 10:38)  POCT Blood Glucose.: 131 mg/dL (22 Dec 2019 07:21)    Lipid panel:           Thyroid:  Diabetes Tests:  Parathyroid Panel:  Adrenals:  RADIOLOGY & ADDITIONAL TESTS:    Imaging Personally Reviewed:  [ ] YES  [ ] NO    Consultant(s) Notes Reviewed:  [ ] YES  [ ] NO    Care Discussed with Consultants/Other Providers [ ] YES  [ ] NO

## 2019-12-22 NOTE — PROGRESS NOTE ADULT - SUBJECTIVE AND OBJECTIVE BOX
INTERVAL HPI/OVERNIGHT EVENTS:  Pt seen and examined at bedside.     Allergies/Intolerance: No Known Allergies      MEDICATIONS  (STANDING):  atorvastatin 40 milliGRAM(s) Oral at bedtime  cholecalciferol 2000 Unit(s) Oral daily  dextrose 5%. 1000 milliLiter(s) (50 mL/Hr) IV Continuous <Continuous>  dextrose 50% Injectable 12.5 Gram(s) IV Push once  dextrose 50% Injectable 25 Gram(s) IV Push once  dextrose 50% Injectable 25 Gram(s) IV Push once  heparin  Injectable 5000 Unit(s) SubCutaneous every 12 hours  insulin lispro (HumaLOG) corrective regimen sliding scale   SubCutaneous three times a day before meals  levETIRAcetam 500 milliGRAM(s) Oral two times a day  polyethylene glycol 3350 17 Gram(s) Oral daily  senna 2 Tablet(s) Oral at bedtime    MEDICATIONS  (PRN):  acetaminophen  Suppository .. 650 milliGRAM(s) Rectal every 6 hours PRN Temp greater or equal to 38C (100.4F), Mild Pain (1 - 3)  dextrose 40% Gel 15 Gram(s) Oral once PRN Blood Glucose LESS THAN 70 milliGRAM(s)/deciliter  glucagon  Injectable 1 milliGRAM(s) IntraMuscular once PRN Glucose LESS THAN 70 milligrams/deciliter        ROS: all systems reviewed and wnl      PHYSICAL EXAMINATION:  Vital Signs Last 24 Hrs  T(C): 36.8 (22 Dec 2019 04:57), Max: 36.9 (21 Dec 2019 23:29)  T(F): 98.2 (22 Dec 2019 04:57), Max: 98.4 (21 Dec 2019 23:29)  HR: 87 (22 Dec 2019 04:57) (87 - 98)  BP: 128/64 (22 Dec 2019 04:57) (108/66 - 128/64)  BP(mean): --  RR: 18 (22 Dec 2019 04:57) (17 - 18)  SpO2: 100% (22 Dec 2019 04:57) (100% - 100%)  CAPILLARY BLOOD GLUCOSE      POCT Blood Glucose.: 131 mg/dL (22 Dec 2019 07:21)  POCT Blood Glucose.: 160 mg/dL (21 Dec 2019 21:44)  POCT Blood Glucose.: 118 mg/dL (21 Dec 2019 16:48)  POCT Blood Glucose.: 160 mg/dL (21 Dec 2019 10:47)      12-21 @ 07:01  -  12-22 @ 07:00  --------------------------------------------------------  IN: 480 mL / OUT: 375 mL / NET: 105 mL        GENERAL:   NECK: supple, No JVD  CHEST/LUNG: clear to auscultation bilaterally; no rales, rhonchi, or wheezing b/l  HEART: normal S1, S2  ABDOMEN: BS+, soft, ND, NT   EXTREMITIES:  pulses palpable; no clubbing, cyanosis, or edema b/l LEs  SKIN: no rashes or lesions      LABS:                        7.7    8.35  )-----------( 183      ( 21 Dec 2019 07:18 )             25.6     12-21    145  |  112<H>  |  22  ----------------------------<  97  3.6   |  24  |  1.20    Ca    8.1<L>      21 Dec 2019 07:18  Phos  2.6     12-21  Mg     2.3     12-21    TPro  5.8<L>  /  Alb  2.3<L>  /  TBili  0.5  /  DBili  .13  /  AST  26  /  ALT  46  /  AlkPhos  64  12-21 INTERVAL HPI/OVERNIGHT EVENTS:  Pt seen and examined at bedside.     Allergies/Intolerance: No Known Allergies      MEDICATIONS  (STANDING):  atorvastatin 40 milliGRAM(s) Oral at bedtime  cholecalciferol 2000 Unit(s) Oral daily  dextrose 5%. 1000 milliLiter(s) (50 mL/Hr) IV Continuous <Continuous>  dextrose 50% Injectable 12.5 Gram(s) IV Push once  dextrose 50% Injectable 25 Gram(s) IV Push once  dextrose 50% Injectable 25 Gram(s) IV Push once  heparin  Injectable 5000 Unit(s) SubCutaneous every 12 hours  insulin lispro (HumaLOG) corrective regimen sliding scale   SubCutaneous three times a day before meals  levETIRAcetam 500 milliGRAM(s) Oral two times a day  polyethylene glycol 3350 17 Gram(s) Oral daily  senna 2 Tablet(s) Oral at bedtime    MEDICATIONS  (PRN):  acetaminophen  Suppository .. 650 milliGRAM(s) Rectal every 6 hours PRN Temp greater or equal to 38C (100.4F), Mild Pain (1 - 3)  dextrose 40% Gel 15 Gram(s) Oral once PRN Blood Glucose LESS THAN 70 milliGRAM(s)/deciliter  glucagon  Injectable 1 milliGRAM(s) IntraMuscular once PRN Glucose LESS THAN 70 milligrams/deciliter        ROS: all systems reviewed and wnl      PHYSICAL EXAMINATION:  Vital Signs Last 24 Hrs  T(C): 36.8 (22 Dec 2019 04:57), Max: 36.9 (21 Dec 2019 23:29)  T(F): 98.2 (22 Dec 2019 04:57), Max: 98.4 (21 Dec 2019 23:29)  HR: 87 (22 Dec 2019 04:57) (87 - 98)  BP: 128/64 (22 Dec 2019 04:57) (108/66 - 128/64)  BP(mean): --  RR: 18 (22 Dec 2019 04:57) (17 - 18)  SpO2: 100% (22 Dec 2019 04:57) (100% - 100%)  CAPILLARY BLOOD GLUCOSE      POCT Blood Glucose.: 131 mg/dL (22 Dec 2019 07:21)  POCT Blood Glucose.: 160 mg/dL (21 Dec 2019 21:44)  POCT Blood Glucose.: 118 mg/dL (21 Dec 2019 16:48)  POCT Blood Glucose.: 160 mg/dL (21 Dec 2019 10:47)      12-21 @ 07:01  -  12-22 @ 07:00  --------------------------------------------------------  IN: 480 mL / OUT: 375 mL / NET: 105 mL        GENERAL: stable in bed, asleep, no fevers or SOB. Jaimes placed yesterday for retension.   NECK: supple, No JVD  CHEST/LUNG: clear to auscultation bilaterally; no rales, rhonchi, or wheezing b/l  HEART: normal S1, S2  ABDOMEN: BS+, soft, ND, NT   EXTREMITIES:  pulses palpable; no clubbing, cyanosis, or edema b/l LEs  SKIN: no rashes or lesions      LABS:                        7.7    8.35  )-----------( 183      ( 21 Dec 2019 07:18 )             25.6     12-21    145  |  112<H>  |  22  ----------------------------<  97  3.6   |  24  |  1.20    Ca    8.1<L>      21 Dec 2019 07:18  Phos  2.6     12-21  Mg     2.3     12-21    TPro  5.8<L>  /  Alb  2.3<L>  /  TBili  0.5  /  DBili  .13  /  AST  26  /  ALT  46  /  AlkPhos  64  12-21

## 2019-12-22 NOTE — PROGRESS NOTE ADULT - PROBLEM SELECTOR PLAN 4
urine culture enterococcus faecalis from admission. wbc now wnl s/p abx course   repeat Urine Cx NGTD. All ABX done.

## 2019-12-23 LAB
GLUCOSE BLDC GLUCOMTR-MCNC: 103 MG/DL — HIGH (ref 70–99)
GLUCOSE BLDC GLUCOMTR-MCNC: 119 MG/DL — HIGH (ref 70–99)
GLUCOSE BLDC GLUCOMTR-MCNC: 135 MG/DL — HIGH (ref 70–99)
GLUCOSE BLDC GLUCOMTR-MCNC: 208 MG/DL — HIGH (ref 70–99)
PTH RELATED PROT SERPL-MCNC: <2 PMOL/L — SIGNIFICANT CHANGE UP

## 2019-12-23 PROCEDURE — 99232 SBSQ HOSP IP/OBS MODERATE 35: CPT

## 2019-12-23 RX ADMIN — HEPARIN SODIUM 5000 UNIT(S): 5000 INJECTION INTRAVENOUS; SUBCUTANEOUS at 05:16

## 2019-12-23 RX ADMIN — ATORVASTATIN CALCIUM 40 MILLIGRAM(S): 80 TABLET, FILM COATED ORAL at 21:48

## 2019-12-23 RX ADMIN — SENNA PLUS 2 TABLET(S): 8.6 TABLET ORAL at 21:48

## 2019-12-23 RX ADMIN — HEPARIN SODIUM 5000 UNIT(S): 5000 INJECTION INTRAVENOUS; SUBCUTANEOUS at 17:17

## 2019-12-23 RX ADMIN — Medication 100 MILLIGRAM(S): at 05:17

## 2019-12-23 RX ADMIN — POLYETHYLENE GLYCOL 3350 17 GRAM(S): 17 POWDER, FOR SOLUTION ORAL at 11:09

## 2019-12-23 RX ADMIN — LEVETIRACETAM 500 MILLIGRAM(S): 250 TABLET, FILM COATED ORAL at 17:17

## 2019-12-23 RX ADMIN — Medication 2: at 11:09

## 2019-12-23 RX ADMIN — Medication 2000 UNIT(S): at 11:09

## 2019-12-23 RX ADMIN — Medication 100 MILLIGRAM(S): at 17:17

## 2019-12-23 RX ADMIN — LEVETIRACETAM 500 MILLIGRAM(S): 250 TABLET, FILM COATED ORAL at 05:17

## 2019-12-23 NOTE — PROGRESS NOTE ADULT - SUBJECTIVE AND OBJECTIVE BOX
INTERVAL HPI/OVERNIGHT EVENTS:  Pt seen and examined at bedside.     Allergies/Intolerance: No Known Allergies      MEDICATIONS  (STANDING):  allopurinol 100 milliGRAM(s) Oral two times a day  atorvastatin 40 milliGRAM(s) Oral at bedtime  cholecalciferol 2000 Unit(s) Oral daily  dextrose 5%. 1000 milliLiter(s) (50 mL/Hr) IV Continuous <Continuous>  dextrose 50% Injectable 12.5 Gram(s) IV Push once  dextrose 50% Injectable 25 Gram(s) IV Push once  dextrose 50% Injectable 25 Gram(s) IV Push once  heparin  Injectable 5000 Unit(s) SubCutaneous every 12 hours  insulin lispro (HumaLOG) corrective regimen sliding scale   SubCutaneous three times a day before meals  levETIRAcetam 500 milliGRAM(s) Oral two times a day  polyethylene glycol 3350 17 Gram(s) Oral daily  senna 2 Tablet(s) Oral at bedtime    MEDICATIONS  (PRN):  acetaminophen  Suppository .. 650 milliGRAM(s) Rectal every 6 hours PRN Temp greater or equal to 38C (100.4F), Mild Pain (1 - 3)  dextrose 40% Gel 15 Gram(s) Oral once PRN Blood Glucose LESS THAN 70 milliGRAM(s)/deciliter  glucagon  Injectable 1 milliGRAM(s) IntraMuscular once PRN Glucose LESS THAN 70 milligrams/deciliter        ROS: all systems reviewed and wnl      PHYSICAL EXAMINATION:  Vital Signs Last 24 Hrs  T(C): 36.8 (23 Dec 2019 05:33), Max: 37.2 (22 Dec 2019 18:21)  T(F): 98.3 (23 Dec 2019 05:33), Max: 98.9 (22 Dec 2019 18:21)  HR: 98 (23 Dec 2019 05:33) (94 - 99)  BP: 123/53 (23 Dec 2019 05:33) (112/58 - 123/53)  BP(mean): --  RR: 17 (23 Dec 2019 05:33) (16 - 17)  SpO2: 99% (23 Dec 2019 05:33) (99% - 99%)  CAPILLARY BLOOD GLUCOSE      POCT Blood Glucose.: 119 mg/dL (23 Dec 2019 07:20)  POCT Blood Glucose.: 128 mg/dL (22 Dec 2019 21:34)  POCT Blood Glucose.: 143 mg/dL (22 Dec 2019 17:20)  POCT Blood Glucose.: 172 mg/dL (22 Dec 2019 10:38)      12-22 @ 07:01  -  12-23 @ 07:00  --------------------------------------------------------  IN: 129 mL / OUT: 735 mL / NET: -606 mL        GENERAL:   NECK: supple, No JVD  CHEST/LUNG: clear to auscultation bilaterally; no rales, rhonchi, or wheezing b/l  HEART: normal S1, S2  ABDOMEN: BS+, soft, ND, NT   EXTREMITIES:  pulses palpable; no clubbing, cyanosis, or edema b/l LEs  SKIN: no rashes or lesions      LABS: INTERVAL HPI/OVERNIGHT EVENTS:  Pt seen and examined at bedside.     Allergies/Intolerance: No Known Allergies      MEDICATIONS  (STANDING):  allopurinol 100 milliGRAM(s) Oral two times a day  atorvastatin 40 milliGRAM(s) Oral at bedtime  cholecalciferol 2000 Unit(s) Oral daily  dextrose 5%. 1000 milliLiter(s) (50 mL/Hr) IV Continuous <Continuous>  dextrose 50% Injectable 12.5 Gram(s) IV Push once  dextrose 50% Injectable 25 Gram(s) IV Push once  dextrose 50% Injectable 25 Gram(s) IV Push once  heparin  Injectable 5000 Unit(s) SubCutaneous every 12 hours  insulin lispro (HumaLOG) corrective regimen sliding scale   SubCutaneous three times a day before meals  levETIRAcetam 500 milliGRAM(s) Oral two times a day  polyethylene glycol 3350 17 Gram(s) Oral daily  senna 2 Tablet(s) Oral at bedtime    MEDICATIONS  (PRN):  acetaminophen  Suppository .. 650 milliGRAM(s) Rectal every 6 hours PRN Temp greater or equal to 38C (100.4F), Mild Pain (1 - 3)  dextrose 40% Gel 15 Gram(s) Oral once PRN Blood Glucose LESS THAN 70 milliGRAM(s)/deciliter  glucagon  Injectable 1 milliGRAM(s) IntraMuscular once PRN Glucose LESS THAN 70 milligrams/deciliter        ROS: all systems reviewed and wnl      PHYSICAL EXAMINATION:  Vital Signs Last 24 Hrs  T(C): 36.8 (23 Dec 2019 05:33), Max: 37.2 (22 Dec 2019 18:21)  T(F): 98.3 (23 Dec 2019 05:33), Max: 98.9 (22 Dec 2019 18:21)  HR: 98 (23 Dec 2019 05:33) (94 - 99)  BP: 123/53 (23 Dec 2019 05:33) (112/58 - 123/53)  BP(mean): --  RR: 17 (23 Dec 2019 05:33) (16 - 17)  SpO2: 99% (23 Dec 2019 05:33) (99% - 99%)  CAPILLARY BLOOD GLUCOSE      POCT Blood Glucose.: 119 mg/dL (23 Dec 2019 07:20)  POCT Blood Glucose.: 128 mg/dL (22 Dec 2019 21:34)  POCT Blood Glucose.: 143 mg/dL (22 Dec 2019 17:20)  POCT Blood Glucose.: 172 mg/dL (22 Dec 2019 10:38)      12-22 @ 07:01  -  12-23 @ 07:00  --------------------------------------------------------  IN: 129 mL / OUT: 735 mL / NET: -606 mL        GENERAL: stable in bed, no new complaints, de jesus in place  NECK: supple, No JVD  CHEST/LUNG: clear to auscultation bilaterally; no rales, rhonchi, or wheezing b/l  HEART: normal S1, S2  ABDOMEN: BS+, soft, ND, NT   EXTREMITIES:  pulses palpable; no clubbing, cyanosis, or edema b/l LEs  SKIN: no rashes or lesions      LABS:

## 2019-12-23 NOTE — PROGRESS NOTE ADULT - SUBJECTIVE AND OBJECTIVE BOX
INTERVAL History:  Weak  Anemic  Allergies    No Known Allergies    Intolerances        MEDICATIONS  (STANDING):  allopurinol 100 milliGRAM(s) Oral two times a day  atorvastatin 40 milliGRAM(s) Oral at bedtime  cholecalciferol 2000 Unit(s) Oral daily  dextrose 5%. 1000 milliLiter(s) (50 mL/Hr) IV Continuous <Continuous>  dextrose 50% Injectable 12.5 Gram(s) IV Push once  dextrose 50% Injectable 25 Gram(s) IV Push once  dextrose 50% Injectable 25 Gram(s) IV Push once  heparin  Injectable 5000 Unit(s) SubCutaneous every 12 hours  insulin lispro (HumaLOG) corrective regimen sliding scale   SubCutaneous three times a day before meals  levETIRAcetam 500 milliGRAM(s) Oral two times a day  polyethylene glycol 3350 17 Gram(s) Oral daily  senna 2 Tablet(s) Oral at bedtime    MEDICATIONS  (PRN):  acetaminophen  Suppository .. 650 milliGRAM(s) Rectal every 6 hours PRN Temp greater or equal to 38C (100.4F), Mild Pain (1 - 3)  dextrose 40% Gel 15 Gram(s) Oral once PRN Blood Glucose LESS THAN 70 milliGRAM(s)/deciliter  glucagon  Injectable 1 milliGRAM(s) IntraMuscular once PRN Glucose LESS THAN 70 milligrams/deciliter      Vital Signs Last 24 Hrs  T(C): 36.8 (23 Dec 2019 05:33), Max: 37.2 (22 Dec 2019 18:21)  T(F): 98.3 (23 Dec 2019 05:33), Max: 98.9 (22 Dec 2019 18:21)  HR: 98 (23 Dec 2019 05:33) (94 - 99)  BP: 123/53 (23 Dec 2019 05:33) (112/58 - 123/53)  BP(mean): --  RR: 17 (23 Dec 2019 05:33) (16 - 17)  SpO2: 99% (23 Dec 2019 05:33) (99% - 99%)    PHYSICAL EXAM:      EYES: EOMI, PERRLA, conjunctiva and sclera clear  NECK: Supple, No JVD, Normal thyroid  CHEST/LUNG: Clear to percussion bilaterally; No rales, rhonchi,   HEART: Regular rate and rhythm;   ABDOMEN: Soft, Nontender.        LABS:                  RADIOLOGY & ADDITIONAL STUDIES:    PATHOLOGY:

## 2019-12-23 NOTE — PROGRESS NOTE ADULT - PROBLEM SELECTOR PLAN 1
associated with anemia unspecified possible lymphoproliferative disorder   PTH low, PTHRP wnl, vitamin d level wnl ( low normal). ruled out hyperthyroid check tsh wnl  free t4 wnl. Bone marrow biopsy done. spep and upep noted: no m spike, no bence israel protein. endocrine consult reviewed and appreciated s/p Aredia on 12/11/19, Calcium normalized today. 12/13/19 obtained ct chest abd pelvis ? lobular stomach therefore consulted gi s/p  egd on Monday 12/16/19 c/w gastritis no mass. BM Bx done Friday 12/20/19 by IR , discussed with daughters at bedside.  Family awaiting bone marrow biopsy. associated with anemia unspecified possible lymphoproliferative disorder   PTH low, PTHRP wnl, vitamin d level wnl ( low normal). ruled out hyperthyroid check tsh wnl  free t4 wnl. Bone marrow biopsy done. spep and upep noted: no m spike, no bence israel protein. endocrine consult reviewed and appreciated s/p Aredia on 12/11/19, Calcium normalized today. 12/13/19 obtained ct chest abd pelvis ? lobular stomach therefore consulted gi s/p  egd on Monday 12/16/19 c/w gastritis no mass. BM Bx done Friday 12/20/19 by IR , discussed with daughters at bedside.  Family awaiting bone marrow biopsy. Can be followed up after discharge.

## 2019-12-23 NOTE — PROGRESS NOTE ADULT - ASSESSMENT
89 y/o Female PMH of HTN, DM2, HL, seizure d/o, gout presents to ED for eval of increasing weakness and change in mental status over the past few weeks.  Family states some days pt is at her baseline, then other times she is more lethargic, noted drooling.  Family states pt taken to outside hospital twice for sx, work up neg & dc home.  Family states pt has been compliant w her meds, no known grand mal seizures.  No fall/head trauma.  Pt currently denies pain, denies all complaints; she is poor historian, on memantine, possible dementia. She is clinically dehydrated with elevated creatinine, has hypercalcemia, likely metabolic/septic encephalopathy.    Pt was initially admitted with a probable UTI ,Acute urinary retention and MARGARET and de jesus was placed and was started on IV Rocephin ,Urine c/s later grew enterococcus . AS leukocytosis was already resolving  the antibiotics was not changed .Pt also was getting azithro for a possible pneumonia   CT abdo abd Chest was performed . She was found to have a suspicious lesion on stomach and underwent EGD.pt is having fever since EGD as well .New blood and urine c/s has been sent .She is awake but denies any c/o . antibiotics have been switched to Zosyn. All ABX stopped.      Dispo: SO CARD done       #DM2, A1c 7.7%  continue with ISS,  d/c lantus for now and monitor FS as they are on low side  Stable 131-160.   FS goal 140-180    #Hypophosphatemia --replete and monitor     #Vitamin D insufficiency --supplement

## 2019-12-23 NOTE — PROGRESS NOTE ADULT - SUBJECTIVE AND OBJECTIVE BOX
Patient is a 89y old  Female who presents with a chief complaint of Weakness. (23 Dec 2019 08:28)      INTERVAL HPI/OVERNIGHT EVENTS:  pt with no complaints  eating well, good appetite    MEDICATIONS  (STANDING):  allopurinol 100 milliGRAM(s) Oral two times a day  atorvastatin 40 milliGRAM(s) Oral at bedtime  cholecalciferol 2000 Unit(s) Oral daily  dextrose 5%. 1000 milliLiter(s) (50 mL/Hr) IV Continuous <Continuous>  dextrose 50% Injectable 12.5 Gram(s) IV Push once  dextrose 50% Injectable 25 Gram(s) IV Push once  dextrose 50% Injectable 25 Gram(s) IV Push once  heparin  Injectable 5000 Unit(s) SubCutaneous every 12 hours  insulin lispro (HumaLOG) corrective regimen sliding scale   SubCutaneous three times a day before meals  levETIRAcetam 500 milliGRAM(s) Oral two times a day  polyethylene glycol 3350 17 Gram(s) Oral daily  senna 2 Tablet(s) Oral at bedtime    MEDICATIONS  (PRN):  acetaminophen  Suppository .. 650 milliGRAM(s) Rectal every 6 hours PRN Temp greater or equal to 38C (100.4F), Mild Pain (1 - 3)  dextrose 40% Gel 15 Gram(s) Oral once PRN Blood Glucose LESS THAN 70 milliGRAM(s)/deciliter  glucagon  Injectable 1 milliGRAM(s) IntraMuscular once PRN Glucose LESS THAN 70 milligrams/deciliter      REVIEW OF SYSTEMS:  CONSTITUTIONAL: No fever, weight loss, or fatigue  RESPIRATORY: No cough, wheezing, chills or hemoptysis; No shortness of breath  CARDIOVASCULAR: No chest pain, palpitations, dizziness, or leg swelling  GASTROINTESTINAL: No abdominal or epigastric pain. No nausea, vomiting, or hematemesis; No diarrhea or constipation. No melena or hematochezia.  ENDOCRINE: No heat or cold intolerance; No hair loss      Vital Signs Last 24 Hrs  T(C): 36.8 (23 Dec 2019 05:33), Max: 37.2 (22 Dec 2019 18:21)  T(F): 98.3 (23 Dec 2019 05:33), Max: 98.9 (22 Dec 2019 18:21)  HR: 98 (23 Dec 2019 05:33) (94 - 99)  BP: 123/53 (23 Dec 2019 05:33) (112/58 - 123/53)  BP(mean): --  RR: 17 (23 Dec 2019 05:33) (16 - 17)  SpO2: 99% (23 Dec 2019 05:33) (99% - 99%)    PHYSICAL EXAM:  GENERAL: NAD, well-groomed, well-developed        LABS:              CAPILLARY BLOOD GLUCOSE      POCT Blood Glucose.: 119 mg/dL (23 Dec 2019 07:20)  POCT Blood Glucose.: 128 mg/dL (22 Dec 2019 21:34)  POCT Blood Glucose.: 143 mg/dL (22 Dec 2019 17:20)  POCT Blood Glucose.: 172 mg/dL (22 Dec 2019 10:38)    Lipid panel:               RADIOLOGY & ADDITIONAL TESTS:

## 2019-12-24 ENCOUNTER — TRANSCRIPTION ENCOUNTER (OUTPATIENT)
Age: 84
End: 2019-12-24

## 2019-12-24 VITALS
TEMPERATURE: 100 F | OXYGEN SATURATION: 98 % | SYSTOLIC BLOOD PRESSURE: 108 MMHG | RESPIRATION RATE: 17 BRPM | HEART RATE: 98 BPM

## 2019-12-24 LAB
GLUCOSE BLDC GLUCOMTR-MCNC: 123 MG/DL — HIGH (ref 70–99)
GLUCOSE BLDC GLUCOMTR-MCNC: 128 MG/DL — HIGH (ref 70–99)
GLUCOSE BLDC GLUCOMTR-MCNC: 149 MG/DL — HIGH (ref 70–99)
TM INTERPRETATION: SIGNIFICANT CHANGE UP

## 2019-12-24 PROCEDURE — 99239 HOSP IP/OBS DSCHRG MGMT >30: CPT

## 2019-12-24 RX ORDER — METFORMIN HYDROCHLORIDE 850 MG/1
1 TABLET ORAL
Qty: 0 | Refills: 0 | DISCHARGE

## 2019-12-24 RX ORDER — LOSARTAN POTASSIUM 100 MG/1
1 TABLET, FILM COATED ORAL
Qty: 0 | Refills: 0 | DISCHARGE

## 2019-12-24 RX ORDER — ASPIRIN/CALCIUM CARB/MAGNESIUM 324 MG
1 TABLET ORAL
Qty: 0 | Refills: 0 | DISCHARGE

## 2019-12-24 RX ORDER — ACETAMINOPHEN 500 MG
1 TABLET ORAL
Qty: 0 | Refills: 0 | DISCHARGE
Start: 2019-12-24

## 2019-12-24 RX ORDER — INSULIN LISPRO 100/ML
0 VIAL (ML) SUBCUTANEOUS
Qty: 0 | Refills: 0 | DISCHARGE

## 2019-12-24 RX ORDER — POLYETHYLENE GLYCOL 3350 17 G/17G
17 POWDER, FOR SOLUTION ORAL
Qty: 0 | Refills: 0 | DISCHARGE
Start: 2019-12-24

## 2019-12-24 RX ORDER — CHOLECALCIFEROL (VITAMIN D3) 125 MCG
2000 CAPSULE ORAL
Qty: 0 | Refills: 0 | DISCHARGE
Start: 2019-12-24

## 2019-12-24 RX ORDER — SENNA PLUS 8.6 MG/1
2 TABLET ORAL
Qty: 0 | Refills: 0 | DISCHARGE
Start: 2019-12-24

## 2019-12-24 RX ORDER — AMLODIPINE BESYLATE 2.5 MG/1
1 TABLET ORAL
Qty: 0 | Refills: 0 | DISCHARGE

## 2019-12-24 RX ADMIN — Medication 2000 UNIT(S): at 11:35

## 2019-12-24 RX ADMIN — Medication 100 MILLIGRAM(S): at 05:07

## 2019-12-24 RX ADMIN — POLYETHYLENE GLYCOL 3350 17 GRAM(S): 17 POWDER, FOR SOLUTION ORAL at 11:36

## 2019-12-24 RX ADMIN — HEPARIN SODIUM 5000 UNIT(S): 5000 INJECTION INTRAVENOUS; SUBCUTANEOUS at 05:08

## 2019-12-24 RX ADMIN — HEPARIN SODIUM 5000 UNIT(S): 5000 INJECTION INTRAVENOUS; SUBCUTANEOUS at 17:11

## 2019-12-24 RX ADMIN — Medication 1: at 07:48

## 2019-12-24 RX ADMIN — Medication 100 MILLIGRAM(S): at 17:11

## 2019-12-24 RX ADMIN — LEVETIRACETAM 500 MILLIGRAM(S): 250 TABLET, FILM COATED ORAL at 05:07

## 2019-12-24 RX ADMIN — LEVETIRACETAM 500 MILLIGRAM(S): 250 TABLET, FILM COATED ORAL at 17:11

## 2019-12-24 NOTE — PROGRESS NOTE ADULT - PROBLEM SELECTOR PROBLEM 1
Acute urinary retention
Humoral hypercalcemia of malignancy
Hypercalcemia
Leukocytosis, unspecified type
Urinary tract infection with hematuria, site unspecified
Humoral hypercalcemia of malignancy
Hypercalcemia
Metabolic encephalopathy
Type 2 diabetes mellitus with hyperglycemia, without long-term current use of insulin

## 2019-12-24 NOTE — PROGRESS NOTE ADULT - REASON FOR ADMISSION
Weakness.

## 2019-12-24 NOTE — PROGRESS NOTE ADULT - PROBLEM SELECTOR PROBLEM 2
Hypercalcemia
Hypercalcemia
Leukocytosis, unspecified type
Urinary tract infection with hematuria, site unspecified
Urinary tract infection with hematuria, site unspecified
Fever, unspecified fever cause
Fever, unspecified fever cause
Hospital acquired PNA
Humoral hypercalcemia of malignancy
Humoral hypercalcemia of malignancy
Hypercalcemia
Metabolic encephalopathy
Type 2 diabetes mellitus with hyperglycemia, without long-term current use of insulin
Urinary tract infection with hematuria, site unspecified

## 2019-12-24 NOTE — PROGRESS NOTE ADULT - ASSESSMENT
89 y/o Female PMH of HTN, DM2, HL, seizure d/o, gout presents to ED for eval of increasing weakness and change in mental status over the past few weeks.  Family states some days pt is at her baseline, then other times she is more lethargic, noted drooling.  Family states pt taken to outside hospital twice for sx, work up neg & dc home.  Family states pt has been compliant w her meds, no known grand mal seizures.  No fall/head trauma.  Pt currently denies pain, denies all complaints; she is poor historian, on memantine, possible dementia. She is clinically dehydrated with elevated creatinine, has hypercalcemia, likely metabolic/septic encephalopathy.    Pt was initially admitted with a probable UTI ,Acute urinary retention and MARGARET and de jesus was placed and was started on IV Rocephin ,Urine c/s later grew enterococcus . AS leukocytosis was already resolving  the antibiotics was not changed .Pt also was getting azithro for a possible pneumonia   CT abdo abd Chest was performed . She was found to have a suspicious lesion on stomach and underwent EGD.pt is having fever since EGD as well .New blood and urine c/s has been sent .She is awake but denies any c/o . antibiotics have been switched to Zosyn. All ABX stopped.      Dispo: SO CARD done       #DM2, A1c 7.7%  continue with ISS,  d/c lantus for now and monitor FS as they are on low side  Stable 131-160.   FS goal 140-180    #Hypophosphatemia --replete and monitor     #Vitamin D insufficiency --supplement 89 y/o Female PMH of HTN, DM2, HL, seizure d/o, gout presents to ED for eval of increasing weakness and change in mental status over the past few weeks.  Family states some days pt is at her baseline, then other times she is more lethargic, noted drooling.  Family states pt taken to outside hospital twice for sx, work up neg & dc home.  Family states pt has been compliant w her meds, no known grand mal seizures.  No fall/head trauma.  Pt currently denies pain, denies all complaints; she is poor historian, on memantine, possible dementia. She is clinically dehydrated with elevated creatinine, has hypercalcemia, likely metabolic/septic encephalopathy.    Pt was initially admitted with a probable UTI ,Acute urinary retention and MARGARET and de jesus was placed and was started on IV Rocephin ,Urine c/s later grew enterococcus . AS leukocytosis was already resolving  the antibiotics was not changed .Pt also was getting azithro for a possible pneumonia   CT abdo abd Chest was performed . She was found to have a suspicious lesion on stomach and underwent EGD.pt is having fever since EGD as well .New blood and urine c/s has been sent .She is awake but denies any c/o . antibiotics have been switched to Zosyn. All ABX stopped.      Dispo: SO CARD done       #DM2, A1c 7.7%  continue with ISS, monitor off meds  d/c lantus for now and monitor FS as they are on low side  Stable 131-160.   FS goal 140-180    #Hypophosphatemia --replete and monitor     #Vitamin D insufficiency --supplement

## 2019-12-24 NOTE — PROGRESS NOTE ADULT - SUBJECTIVE AND OBJECTIVE BOX
INTERVAL HPI/OVERNIGHT EVENTS:  Pt seen and examined at bedside.     Allergies/Intolerance: No Known Allergies      MEDICATIONS  (STANDING):  allopurinol 100 milliGRAM(s) Oral two times a day  atorvastatin 40 milliGRAM(s) Oral at bedtime  cholecalciferol 2000 Unit(s) Oral daily  dextrose 5%. 1000 milliLiter(s) (50 mL/Hr) IV Continuous <Continuous>  dextrose 50% Injectable 12.5 Gram(s) IV Push once  dextrose 50% Injectable 25 Gram(s) IV Push once  dextrose 50% Injectable 25 Gram(s) IV Push once  heparin  Injectable 5000 Unit(s) SubCutaneous every 12 hours  insulin lispro (HumaLOG) corrective regimen sliding scale   SubCutaneous three times a day before meals  levETIRAcetam 500 milliGRAM(s) Oral two times a day  polyethylene glycol 3350 17 Gram(s) Oral daily  senna 2 Tablet(s) Oral at bedtime    MEDICATIONS  (PRN):  acetaminophen  Suppository .. 650 milliGRAM(s) Rectal every 6 hours PRN Temp greater or equal to 38C (100.4F), Mild Pain (1 - 3)  dextrose 40% Gel 15 Gram(s) Oral once PRN Blood Glucose LESS THAN 70 milliGRAM(s)/deciliter  glucagon  Injectable 1 milliGRAM(s) IntraMuscular once PRN Glucose LESS THAN 70 milligrams/deciliter        ROS: all systems reviewed and wnl      PHYSICAL EXAMINATION:  Vital Signs Last 24 Hrs  T(C): 37.3 (24 Dec 2019 05:39), Max: 37.5 (23 Dec 2019 23:46)  T(F): 99.1 (24 Dec 2019 05:39), Max: 99.5 (23 Dec 2019 23:46)  HR: 104 (24 Dec 2019 05:39) (66 - 104)  BP: 130/62 (24 Dec 2019 05:39) (113/62 - 137/75)  BP(mean): --  RR: 18 (24 Dec 2019 05:39) (15 - 18)  SpO2: 99% (24 Dec 2019 05:39) (97% - 99%)  CAPILLARY BLOOD GLUCOSE      POCT Blood Glucose.: 123 mg/dL (24 Dec 2019 07:38)  POCT Blood Glucose.: 135 mg/dL (23 Dec 2019 22:38)  POCT Blood Glucose.: 103 mg/dL (23 Dec 2019 15:41)  POCT Blood Glucose.: 208 mg/dL (23 Dec 2019 10:46)      12-23 @ 07:01  -  12-24 @ 07:00  --------------------------------------------------------  IN: 120 mL / OUT: 800 mL / NET: -680 mL        GENERAL:   NECK: supple, No JVD  CHEST/LUNG: clear to auscultation bilaterally; no rales, rhonchi, or wheezing b/l  HEART: normal S1, S2  ABDOMEN: BS+, soft, ND, NT   EXTREMITIES:  pulses palpable; no clubbing, cyanosis, or edema b/l LEs  SKIN: no rashes or lesions      LABS: INTERVAL HPI/OVERNIGHT EVENTS:  Pt seen and examined at bedside.     Allergies/Intolerance: No Known Allergies      MEDICATIONS  (STANDING):  allopurinol 100 milliGRAM(s) Oral two times a day  atorvastatin 40 milliGRAM(s) Oral at bedtime  cholecalciferol 2000 Unit(s) Oral daily  dextrose 5%. 1000 milliLiter(s) (50 mL/Hr) IV Continuous <Continuous>  dextrose 50% Injectable 12.5 Gram(s) IV Push once  dextrose 50% Injectable 25 Gram(s) IV Push once  dextrose 50% Injectable 25 Gram(s) IV Push once  heparin  Injectable 5000 Unit(s) SubCutaneous every 12 hours  insulin lispro (HumaLOG) corrective regimen sliding scale   SubCutaneous three times a day before meals  levETIRAcetam 500 milliGRAM(s) Oral two times a day  polyethylene glycol 3350 17 Gram(s) Oral daily  senna 2 Tablet(s) Oral at bedtime    MEDICATIONS  (PRN):  acetaminophen  Suppository .. 650 milliGRAM(s) Rectal every 6 hours PRN Temp greater or equal to 38C (100.4F), Mild Pain (1 - 3)  dextrose 40% Gel 15 Gram(s) Oral once PRN Blood Glucose LESS THAN 70 milliGRAM(s)/deciliter  glucagon  Injectable 1 milliGRAM(s) IntraMuscular once PRN Glucose LESS THAN 70 milligrams/deciliter        ROS: all systems reviewed and wnl      PHYSICAL EXAMINATION:  Vital Signs Last 24 Hrs  T(C): 37.3 (24 Dec 2019 05:39), Max: 37.5 (23 Dec 2019 23:46)  T(F): 99.1 (24 Dec 2019 05:39), Max: 99.5 (23 Dec 2019 23:46)  HR: 104 (24 Dec 2019 05:39) (66 - 104)  BP: 130/62 (24 Dec 2019 05:39) (113/62 - 137/75)  BP(mean): --  RR: 18 (24 Dec 2019 05:39) (15 - 18)  SpO2: 99% (24 Dec 2019 05:39) (97% - 99%)  CAPILLARY BLOOD GLUCOSE      POCT Blood Glucose.: 123 mg/dL (24 Dec 2019 07:38)  POCT Blood Glucose.: 135 mg/dL (23 Dec 2019 22:38)  POCT Blood Glucose.: 103 mg/dL (23 Dec 2019 15:41)  POCT Blood Glucose.: 208 mg/dL (23 Dec 2019 10:46)      12-23 @ 07:01  -  12-24 @ 07:00  --------------------------------------------------------  IN: 120 mL / OUT: 800 mL / NET: -680 mL        GENERAL: comfortable, eager for discharge  NECK: supple, No JVD  CHEST/LUNG: clear to auscultation bilaterally; no rales, rhonchi, or wheezing b/l  HEART: normal S1, S2  ABDOMEN: BS+, soft, ND, NT   EXTREMITIES:  pulses palpable; no clubbing, cyanosis, or edema b/l LEs  SKIN: no rashes or lesions      LABS:

## 2019-12-24 NOTE — PROGRESS NOTE ADULT - SUBJECTIVE AND OBJECTIVE BOX
INTERVAL History:  Weak  Anemia  Allergies    No Known Allergies    Intolerances        MEDICATIONS  (STANDING):  allopurinol 100 milliGRAM(s) Oral two times a day  atorvastatin 40 milliGRAM(s) Oral at bedtime  cholecalciferol 2000 Unit(s) Oral daily  dextrose 5%. 1000 milliLiter(s) (50 mL/Hr) IV Continuous <Continuous>  dextrose 50% Injectable 12.5 Gram(s) IV Push once  dextrose 50% Injectable 25 Gram(s) IV Push once  dextrose 50% Injectable 25 Gram(s) IV Push once  heparin  Injectable 5000 Unit(s) SubCutaneous every 12 hours  insulin lispro (HumaLOG) corrective regimen sliding scale   SubCutaneous three times a day before meals  levETIRAcetam 500 milliGRAM(s) Oral two times a day  polyethylene glycol 3350 17 Gram(s) Oral daily  senna 2 Tablet(s) Oral at bedtime    MEDICATIONS  (PRN):  acetaminophen  Suppository .. 650 milliGRAM(s) Rectal every 6 hours PRN Temp greater or equal to 38C (100.4F), Mild Pain (1 - 3)  dextrose 40% Gel 15 Gram(s) Oral once PRN Blood Glucose LESS THAN 70 milliGRAM(s)/deciliter  glucagon  Injectable 1 milliGRAM(s) IntraMuscular once PRN Glucose LESS THAN 70 milligrams/deciliter      Vital Signs Last 24 Hrs  T(C): 37.3 (24 Dec 2019 05:39), Max: 37.5 (23 Dec 2019 23:46)  T(F): 99.1 (24 Dec 2019 05:39), Max: 99.5 (23 Dec 2019 23:46)  HR: 104 (24 Dec 2019 05:39) (66 - 104)  BP: 130/62 (24 Dec 2019 05:39) (113/62 - 137/75)  BP(mean): --  RR: 18 (24 Dec 2019 05:39) (15 - 18)  SpO2: 99% (24 Dec 2019 05:39) (97% - 99%)    PHYSICAL EXAM:      EYES: EOMI, PERRLA, conjunctiva and sclera clear  NECK: Supple, No JVD, Normal thyroid  CHEST/LUNG: Clear to percussion bilaterally; No rales, rhonchi,   HEART: Regular rate and rhythm;   ABDOMEN: Soft, Nontender.  LYMPH: No lymphadenopathy noted        LABS:                  RADIOLOGY & ADDITIONAL STUDIES:    PATHOLOGY:

## 2019-12-24 NOTE — CHART NOTE - NSCHARTNOTEFT_GEN_A_CORE
Upon Nutritional Assessment by the Registered Dietitian your patient was determined to meet criteria / has evidence of the following diagnosis/diagnoses:          [ ]  Mild Protein Calorie Malnutrition        [ ]  Moderate Protein Calorie Malnutrition        [ x] Severe Protein Calorie Malnutrition( acute)        [ ] Unspecified Protein Calorie Malnutrition        [ ] Underweight / BMI <19        [ ] Morbid Obesity / BMI > 40      Findings as based on:  •  Comprehensive nutrition assessment and consultation  •  Calorie counts (nutrient intake analysis)  •  Food acceptance and intake status from observations by staff  •  Follow up  •  Patient education  •  Intervention secondary to interdisciplinary rounds  •   concerns      Treatment:    The following diet has been recommended:  Dysphagia 2 Mechanical Soft - [place] Consistency Fluids , Low sodium , consistent carbohydrate c evening snack , Ensure Pudding 4 oz 3x/day (510 bryan, 12 gm pro)       PROVIDER Section:     By signing this assessment you are acknowledging and agree with the diagnosis/diagnoses assigned by the Registered Dietitian    Comments:

## 2019-12-24 NOTE — CHART NOTE - NSCHARTNOTEFT_GEN_A_CORE
Assessment:   Pt seen for follow up for altered swallow.  Pt adm c d weakness, UTI, c HTN, CVA, s/p MARGARET, acute on chronic CHF, hypercalcemia, pneumonia, metabolic encephalopathy, discharge plan is for Rehab.  PMH include DM    Factors impacting intake: [ ] none [ ] nausea  [ ] vomiting [ ] diarrhea [ ] constipation  [ ]chewing problems [ x] swallowing issues; 12/17, swallow evaluation recommended Dysphagia 2 Mechanical Soft - [place] Consistency Fluids   [ ] other:     Diet Prescription: Diet, Dysphagia 2 Mechanical Soft-Nectar Consistency Fluid (12-17-19 @ 14:16)    Intake: overall intake <50%    Current Weight: 12/20, 61.6 kg, 12/10, 61.8 kg, c wt. loss of 0.2 kg  % Weight Change: 0.3%  12/15, 1+ edema of left arm & hand noted     Pertinent Medications: MEDICATIONS  (STANDING):  allopurinol 100 milliGRAM(s) Oral two times a day  atorvastatin 40 milliGRAM(s) Oral at bedtime  cholecalciferol 2000 Unit(s) Oral daily  dextrose 5%. 1000 milliLiter(s) (50 mL/Hr) IV Continuous <Continuous>  dextrose 50% Injectable 12.5 Gram(s) IV Push once  dextrose 50% Injectable 25 Gram(s) IV Push once  dextrose 50% Injectable 25 Gram(s) IV Push once  heparin  Injectable 5000 Unit(s) SubCutaneous every 12 hours  insulin lispro (HumaLOG) corrective regimen sliding scale   SubCutaneous three times a day before meals  levETIRAcetam 500 milliGRAM(s) Oral two times a day  polyethylene glycol 3350 17 Gram(s) Oral daily  senna 2 Tablet(s) Oral at bedtime    MEDICATIONS  (PRN):  acetaminophen  Suppository .. 650 milliGRAM(s) Rectal every 6 hours PRN Temp greater or equal to 38C (100.4F), Mild Pain (1 - 3)  dextrose 40% Gel 15 Gram(s) Oral once PRN Blood Glucose LESS THAN 70 milliGRAM(s)/deciliter  glucagon  Injectable 1 milliGRAM(s) IntraMuscular once PRN Glucose LESS THAN 70 milligrams/deciliter    Pertinent Labs:  12-21 Phos 2.6 mg/dL 12-21 Alb 2.3 g/dL<L> 12-11 [id number] 7.7 %<H>     CAPILLARY BLOOD GLUCOSE      POCT Blood Glucose.: 123 mg/dL (24 Dec 2019 07:38)  POCT Blood Glucose.: 135 mg/dL (23 Dec 2019 22:38)  POCT Blood Glucose.: 103 mg/dL (23 Dec 2019 15:41)    Skin: wound, left dorsal palm blister noted     Estimated Needs:   [ ] no change since previous assessment   [ ] recalculated:     Previous Nutrition Diagnosis:   Altered swallow     Etiology altered swallow post EGD, r/o aspiration.     Signs/Symptoms swallow evaluation order, w/o diet order @ present.     Goal/Expected Outcome diet consistency  per swallow evaluation recommendation; met     Nutrition Diagnosis is [ ] ongoing  [ x] resolved [ ] not applicable       New Nutrition Diagnosis:       Interventions:   Recommend  [ ] Change Diet To:  [ ] Nutrition Supplement  [ ] Nutrition Support  [ ] Other:     Monitoring and Evaluation:   [ ] PO intake [ x ] Tolerance to diet prescription [ x ] weights [ x ] labs[ x ] follow up per protocol  [ ] other: Assessment:   Pt seen for follow up for altered swallow.  Pt adm c d weakness, UTI, c HTN, CVA, s/p MARGARET, acute on chronic CHF, hypercalcemia, hospital acquired pneumonia, metabolic encephalopathy, discharge plan is for Rehab.  PMH include DM    Factors impacting intake: [ ] none [ ] nausea  [ ] vomiting [ ] diarrhea [ ] constipation  [ ]chewing problems [ x] swallowing issues; 12/17, swallow evaluation recommended Dysphagia 2 Mechanical Soft - [place] Consistency Fluids   [ x] other: AMS    Diet Prescription: Diet, Dysphagia 2 Mechanical Soft-Nectar Consistency Fluid (12-17-19 @ 14:16)    Intake: overall intake <50%    Current Weight: 12/20, 61.6 kg, 12/10, 61.8 kg, c wt. loss of 0.2 kg  % Weight Change: 0.3%  12/15, 1+ edema of left arm & hand noted     Nutrition focused physical exam conducted; Subcutaneous fat Exam;  [ Mild   ]  Orbital fat pads region,  [ WNL   ]Buccal fat region,  [ Moderate   ]triceps region, [ WNL  ]ribs region.  Muscle Exam; [ Moderate  ]temples region, [ WNL  ]clavicle region, [ WNL  ]shoulder region, [ Unable   ]Scapula region, [ WNL   ]Interosseous region, [ Mild  ]thigh region, [ Mild  ]Calf region, blisters on left hand healing       Pertinent Medications: MEDICATIONS  (STANDING):  allopurinol 100 milliGRAM(s) Oral two times a day  atorvastatin 40 milliGRAM(s) Oral at bedtime  cholecalciferol 2000 Unit(s) Oral daily  dextrose 5%. 1000 milliLiter(s) (50 mL/Hr) IV Continuous <Continuous>  dextrose 50% Injectable 12.5 Gram(s) IV Push once  dextrose 50% Injectable 25 Gram(s) IV Push once  dextrose 50% Injectable 25 Gram(s) IV Push once  heparin  Injectable 5000 Unit(s) SubCutaneous every 12 hours  insulin lispro (HumaLOG) corrective regimen sliding scale   SubCutaneous three times a day before meals  levETIRAcetam 500 milliGRAM(s) Oral two times a day  polyethylene glycol 3350 17 Gram(s) Oral daily  senna 2 Tablet(s) Oral at bedtime    MEDICATIONS  (PRN):  acetaminophen  Suppository .. 650 milliGRAM(s) Rectal every 6 hours PRN Temp greater or equal to 38C (100.4F), Mild Pain (1 - 3)  dextrose 40% Gel 15 Gram(s) Oral once PRN Blood Glucose LESS THAN 70 milliGRAM(s)/deciliter  glucagon  Injectable 1 milliGRAM(s) IntraMuscular once PRN Glucose LESS THAN 70 milligrams/deciliter    Pertinent Labs:  12-21 Phos 2.6 mg/dL 12-21 Alb 2.3 g/dL<L> 12-11 [id number] 7.7 %<H>     CAPILLARY BLOOD GLUCOSE      POCT Blood Glucose.: 123 mg/dL (24 Dec 2019 07:38)  POCT Blood Glucose.: 135 mg/dL (23 Dec 2019 22:38)  POCT Blood Glucose.: 103 mg/dL (23 Dec 2019 15:41)  12-23, POCT blood Glucose 208 mg/dL noted 10:46    Skin: wound, left dorsal palm blister noted     Estimated Needs:   [ ] no change since previous assessment   [x ] recalculated:  for CHF, s/p MARGARET, & age factor  1494 calories( 30 Kcal/ kg IBW)  50-60 grams protein( 1.0-1.2 gram protein/kg IBW)  3472-4531 ml fluid(25-30 ml/kg IBW)    Previous Nutrition Diagnosis:   Altered swallow     Etiology altered swallow post EGD, r/o aspiration.     Signs/Symptoms swallow evaluation order, w/o diet order @ present.     Goal/Expected Outcome diet consistency  per swallow evaluation recommendation; met     Nutrition Diagnosis is [ ] ongoing  [ x] resolved [ ] not applicable       New Nutrition Diagnosis:   [ x] Malnutrition; severe malnutrition in context of acute illness     Related to: inadequate protein-energy intake in setting of UTI, s/p MARGARET, pneumonia     As evidenced by: <50 % nutrition needs > 5 days, physical findings of moderate fat & muscle loss     Goal: pt to consume >50-> 75% of meals & supplement       Interventions:   Recommend  [x ] Change Diet To: Low sodium , consistent carbohydrate c evening snack , Dysphagia 2 Mechanical Soft - [place] Consistency Fluids   [x ] Nutrition Supplement; Ensure Pudding 4 oz 3x/day (510 bryan, 12 gm pro)   [ ] Nutrition Support  [ ] Other:     Monitoring and Evaluation:   [ x] PO intake [ x ] Tolerance to diet prescription [ x ] weights [ x ] labs[ x ] follow up per protocol  [ ] other:

## 2019-12-24 NOTE — DISCHARGE NOTE NURSING/CASE MANAGEMENT/SOCIAL WORK - PATIENT PORTAL LINK FT
You can access the FollowMyHealth Patient Portal offered by [facility] by registering at the following website: [email or web address]. By joining [employer]’s FollowMyHealth portal, you will also be able to view your health information using other applications (apps) compatible with our system.

## 2019-12-24 NOTE — DISCHARGE NOTE NURSING/CASE MANAGEMENT/SOCIAL WORK - NSDCPEPTSTRK_GEN_ALL_CORE
Risk factors for stroke/Stroke support groups for patients, families, and friends/Signs and symptoms of stroke/Call 911 for stroke/Stroke warning signs and symptoms/Need for follow up after discharge/Prescribed medications/Stroke education booklet

## 2019-12-24 NOTE — PROGRESS NOTE ADULT - PROBLEM SELECTOR PLAN 1
associated with anemia unspecified possible lymphoproliferative disorder   PTH low, PTHRP wnl, vitamin d level wnl ( low normal). ruled out hyperthyroid check tsh wnl  free t4 wnl. Bone marrow biopsy done. spep and upep noted: no m spike, no bence israel protein. endocrine consult reviewed and appreciated s/p Aredia on 12/11/19, Calcium normalized today. 12/13/19 obtained ct chest abd pelvis ? lobular stomach therefore consulted gi s/p  egd on Monday 12/16/19 c/w gastritis no mass. BM Bx done Friday 12/20/19 by IR , discussed with daughters at bedside.  Family awaiting bone marrow biopsy. Can be followed up after discharge. associated with anemia unspecified possible lymphoproliferative disorder   PTH low, PTHRP wnl, vitamin d level wnl ( low normal). ruled out hyperthyroid check tsh wnl  free t4 wnl. Bone marrow biopsy done. spep and upep noted: no m spike, no bence israel protein. endocrine consult reviewed and appreciated s/p Aredia on 12/11/19, Calcium normalized today. 12/13/19 obtained ct chest abd pelvis ? lobular stomach therefore consulted gi s/p  egd on Monday 12/16/19 c/w gastritis no mass. BM Bx done Friday 12/20/19 by IR , discussed with daughters at bedside.  Family awaiting bone marrow biopsy. Can be followed up after discharge. Discharge to rehab today. TOV in rehab.

## 2019-12-24 NOTE — DISCHARGE NOTE PROVIDER - HOSPITAL COURSE
89 y/o Female PMH of HTN, DM2, HL, seizure d/o, gout presents to ED for eval of increasing weakness and change in mental status over the past few weeks.  Family states some days pt is at her baseline, then other times she is more lethargic, noted drooling.  Family states pt taken to outside hospital twice for sx, work up neg & dc home.  Family states pt has been compliant w her meds, no known grand mal seizures.  No fall/head trauma.  Pt currently denies pain, denies all complaints; she is poor historian, on memantine, possible dementia.        Pt was initially admitted with a probable UTI ,Acute urinary retention and MARGARET and de jesus was placed and was started on IV Rocephin ,Urine c/s later grew enterococcus . AS leukocytosis was already resolving  the antibiotics was not changed .Pt also was getting azithro for a possible pneumonia     CT abdo abd Chest was performed . She was found to have a suspicious lesion on stomach and underwent EGD.pt is having fever since EGD as well .New blood and urine c/s has been sent .She is awake but denies any c/o . antibiotics have been switched to Zosyn. All ABX stopped.          Dispo: STEPHIE , SO done             #DM2, A1c 7.7%    continue with ISS,    d/c lantus for now and monitor FS as they are on low side  Stable 131-160.     FS goal 140-180        #Hypophosphatemia --replete and monitor         #Vitamin D insufficiency --supplement                  Problem/Plan - 1:    ·  Problem: Hypercalcemia.  Plan: associated with anemia unspecified possible lymphoproliferative disorder     PTH low, PTHRP wnl, vitamin d level wnl ( low normal). ruled out hyperthyroid check tsh wnl  free t4 wnl. Bone marrow biopsy done. spep and upep noted: no m spike, no bence israel protein. endocrine consult reviewed and appreciated s/p Aredia on 12/11/19, Calcium normalized today. 12/13/19 obtained ct chest abd pelvis ? lobular stomach therefore consulted gi s/p  egd on Monday 12/16/19 c/w gastritis no mass. BM Bx done Friday 12/20/19 by IR , discussed with daughters at bedside.    Family awaiting bone marrow biopsy. Can be followed up after discharge.          Problem/Plan - 2:    ·  Problem: Hospital acquired PNA.  Plan: fevers resolved on zosyn. presumed gram neg/anaerobic/gram positive/aspiration  PNA. Blood Cx and Urine Cx NGTD. completed 5 days of Zosyn for pneumonia.    All ABX completed.          Problem/Plan - 3:    ·  Problem: Metabolic encephalopathy.  Plan: resolved likely related to UTI, hypercalcemia, dehydration, IV fluids stopped, cultures in the presence of dementia and old cva. per daughters she is close to baseline.          Problem/Plan - 4:    ·  Problem: Urinary tract infection with hematuria, site unspecified.  Plan: urine culture enterococcus faecalis from admission. wbc now wnl s/p abx course     repeat Urine Cx NGTD. All ABX done.          Problem/Plan - 5:    ·  Problem: Essential hypertension.  Plan: bp stable off all meds.          Problem/Plan - 6:    Problem: MARGARET (acute kidney injury). Plan: no old labs to compare, s/p de jesus on 12/22/19 for retension.  TOV in rehab.         Problem/Plan - 7:    ·  Problem: Acute urinary retention.  Plan: improving s/p de jesus on 12/22/19, monitor PVR.          Problem/Plan - 8:    ·  Problem: Acute on chronic diastolic congestive heart failure.  Plan: echo as above, assumed chronic with an acute exacerbation during hospital due to ivf.  resolved s/p diuretic. continue  diuretic prn.          Problem/Plan - 9:    ·  Problem: Preventive measure.  Plan: Heparin SQ-dvt ppx (held for BM Bx, to be restarted tomorrow per IR)     fall and aspiration precautions.

## 2019-12-24 NOTE — PROGRESS NOTE ADULT - PROBLEM SELECTOR PLAN 9
Heparin SQ-dvt ppx  fall and aspiration precautions   PT recs: STEPHIE
Heparin SQ-dvt ppx  fall and aspiration precautions   PT recs: STEPHIE
Heparin SQ-dvt ppx (held for BM Bx, to be restarted tomorrow per IR)   fall and aspiration precautions
checking echo   d/c ivf now   stat lasix    duo nebs

## 2019-12-24 NOTE — DISCHARGE NOTE PROVIDER - NSDCCPCAREPLAN_GEN_ALL_CORE_FT
PRINCIPAL DISCHARGE DIAGNOSIS  Diagnosis: Weakness  Assessment and Plan of Treatment: - discharge to rehab      SECONDARY DISCHARGE DIAGNOSES  Diagnosis: UTI (urinary tract infection)  Assessment and Plan of Treatment: - discharge with de jesus catheter, Trial of void after one week in rehab

## 2019-12-24 NOTE — PROGRESS NOTE ADULT - PROBLEM SELECTOR PROBLEM 9
Preventive measure
Congestive heart failure, unspecified HF chronicity, unspecified heart failure type

## 2019-12-29 DIAGNOSIS — Z79.84 LONG TERM (CURRENT) USE OF ORAL HYPOGLYCEMIC DRUGS: ICD-10-CM

## 2019-12-29 DIAGNOSIS — E11.51 TYPE 2 DIABETES MELLITUS WITH DIABETIC PERIPHERAL ANGIOPATHY WITHOUT GANGRENE: ICD-10-CM

## 2019-12-29 DIAGNOSIS — B95.2 ENTEROCOCCUS AS THE CAUSE OF DISEASES CLASSIFIED ELSEWHERE: ICD-10-CM

## 2019-12-29 DIAGNOSIS — Z86.73 PERSONAL HISTORY OF TRANSIENT ISCHEMIC ATTACK (TIA), AND CEREBRAL INFARCTION WITHOUT RESIDUAL DEFICITS: ICD-10-CM

## 2019-12-29 DIAGNOSIS — I50.33 ACUTE ON CHRONIC DIASTOLIC (CONGESTIVE) HEART FAILURE: ICD-10-CM

## 2019-12-29 DIAGNOSIS — E83.39 OTHER DISORDERS OF PHOSPHORUS METABOLISM: ICD-10-CM

## 2019-12-29 DIAGNOSIS — I11.0 HYPERTENSIVE HEART DISEASE WITH HEART FAILURE: ICD-10-CM

## 2019-12-29 DIAGNOSIS — N39.0 URINARY TRACT INFECTION, SITE NOT SPECIFIED: ICD-10-CM

## 2019-12-29 DIAGNOSIS — E86.0 DEHYDRATION: ICD-10-CM

## 2019-12-29 DIAGNOSIS — N17.9 ACUTE KIDNEY FAILURE, UNSPECIFIED: ICD-10-CM

## 2019-12-29 DIAGNOSIS — N31.9 NEUROMUSCULAR DYSFUNCTION OF BLADDER, UNSPECIFIED: ICD-10-CM

## 2019-12-29 DIAGNOSIS — E78.5 HYPERLIPIDEMIA, UNSPECIFIED: ICD-10-CM

## 2019-12-29 DIAGNOSIS — E83.52 HYPERCALCEMIA: ICD-10-CM

## 2019-12-29 DIAGNOSIS — R33.8 OTHER RETENTION OF URINE: ICD-10-CM

## 2019-12-29 DIAGNOSIS — G40.909 EPILEPSY, UNSPECIFIED, NOT INTRACTABLE, WITHOUT STATUS EPILEPTICUS: ICD-10-CM

## 2019-12-29 DIAGNOSIS — E11.65 TYPE 2 DIABETES MELLITUS WITH HYPERGLYCEMIA: ICD-10-CM

## 2019-12-29 DIAGNOSIS — K29.70 GASTRITIS, UNSPECIFIED, WITHOUT BLEEDING: ICD-10-CM

## 2019-12-29 DIAGNOSIS — J15.8 PNEUMONIA DUE TO OTHER SPECIFIED BACTERIA: ICD-10-CM

## 2019-12-29 DIAGNOSIS — E55.9 VITAMIN D DEFICIENCY, UNSPECIFIED: ICD-10-CM

## 2019-12-29 DIAGNOSIS — M10.9 GOUT, UNSPECIFIED: ICD-10-CM

## 2019-12-29 DIAGNOSIS — G93.41 METABOLIC ENCEPHALOPATHY: ICD-10-CM

## 2019-12-29 DIAGNOSIS — D64.9 ANEMIA, UNSPECIFIED: ICD-10-CM

## 2019-12-29 DIAGNOSIS — I25.10 ATHEROSCLEROTIC HEART DISEASE OF NATIVE CORONARY ARTERY WITHOUT ANGINA PECTORIS: ICD-10-CM

## 2020-01-06 LAB — CHROM ANALY OVERALL INTERP SPEC-IMP: SIGNIFICANT CHANGE UP

## 2020-01-14 PROBLEM — E11.9 TYPE 2 DIABETES MELLITUS WITHOUT COMPLICATIONS: Chronic | Status: ACTIVE | Noted: 2019-12-09

## 2020-01-14 PROBLEM — I10 ESSENTIAL (PRIMARY) HYPERTENSION: Chronic | Status: ACTIVE | Noted: 2019-12-09

## 2020-01-14 PROBLEM — E78.5 HYPERLIPIDEMIA, UNSPECIFIED: Chronic | Status: ACTIVE | Noted: 2019-12-09

## 2020-01-14 PROBLEM — M10.9 GOUT, UNSPECIFIED: Chronic | Status: ACTIVE | Noted: 2019-12-09

## 2020-01-14 PROBLEM — I63.9 CEREBRAL INFARCTION, UNSPECIFIED: Chronic | Status: ACTIVE | Noted: 2019-12-09

## 2020-02-06 ENCOUNTER — APPOINTMENT (OUTPATIENT)
Dept: UROLOGY | Facility: CLINIC | Age: 85
End: 2020-02-06
Payer: MEDICARE

## 2020-02-06 VITALS
TEMPERATURE: 98.1 F | BODY MASS INDEX: 24.74 KG/M2 | WEIGHT: 126 LBS | HEART RATE: 102 BPM | SYSTOLIC BLOOD PRESSURE: 97 MMHG | HEIGHT: 60 IN | DIASTOLIC BLOOD PRESSURE: 59 MMHG

## 2020-02-06 PROCEDURE — 51702 INSERT TEMP BLADDER CATH: CPT

## 2020-02-06 PROCEDURE — 99213 OFFICE O/P EST LOW 20 MIN: CPT | Mod: 25

## 2020-02-06 NOTE — REVIEW OF SYSTEMS
[Feeling Tired] : feeling tired [Constipation] : constipation [Limb Swelling] : limb swelling [Negative] : Heme/Lymph [see HPI] : see HPI

## 2020-02-06 NOTE — PROCEDURE
[Jaimes Cath Change] : change of an indwelling catheter (Jaimes) [Patient] : the patient [Allergies Reviewed] : Allergies reviewed [Last Aspirin Dose ____] : Reviewed last aspirin dose: [unfilled] [Time Patient Entered Room ___] : patient entered room: [unfilled] [Valves/Prosthetics ___] : Pt has the following valves and/or prosthetics: [unfilled]

## 2020-02-06 NOTE — HISTORY OF PRESENT ILLNESS
[FreeTextEntry1] : 89 y/o female presenting for follow-up for urinary retention and hypotonic bladder secondary to diabetic neuropathy and stroke being managed with Jaimes catheter. Changed Jaimes today and educated family on catheter change and bags.\par \par f/u in 4 wks for suprapubic cystostomy

## 2020-02-06 NOTE — PHYSICAL EXAM
[General Appearance - Well Developed] : well developed [Normal Appearance] : normal appearance [General Appearance - Well Nourished] : well nourished [Well Groomed] : well groomed [General Appearance - In No Acute Distress] : no acute distress [Edema] : no peripheral edema [Exaggerated Use Of Accessory Muscles For Inspiration] : no accessory muscle use [Respiration, Rhythm And Depth] : normal respiratory rhythm and effort [Abdomen Soft] : soft [Abdomen Tenderness] : non-tender [Costovertebral Angle Tenderness] : no ~M costovertebral angle tenderness [Urinary Bladder Findings] : the bladder was normal on palpation [Normal Station and Gait] : the gait and station were normal for the patient's age [] : no rash [No Focal Deficits] : no focal deficits [Affect] : the affect was normal [Oriented To Time, Place, And Person] : oriented to person, place, and time [Mood] : the mood was normal [Not Anxious] : not anxious [No Palpable Adenopathy] : no palpable adenopathy [FreeTextEntry1] : limited examination

## 2020-02-06 NOTE — ADDENDUM
[FreeTextEntry1] : I, Cyndee Innocent, acted solely as a scribe for Dr. Tobi Rogers on this date, 2/6/2020\par \par All medical record entries made by the Scribe were at my Dr. Tobi Rogers direction and personally dictated by me on, 2/6/2020. I have reviewed the chart and agree that the record accurately reflects my personal performance of the history, physical exam, assessment and plan. I have also personally directed, reviewed and agreed with the chart.\par

## 2020-02-18 ENCOUNTER — APPOINTMENT (OUTPATIENT)
Dept: UROLOGY | Facility: CLINIC | Age: 85
End: 2020-02-18

## 2020-03-03 ENCOUNTER — APPOINTMENT (OUTPATIENT)
Dept: UROLOGY | Facility: CLINIC | Age: 85
End: 2020-03-03

## 2020-05-26 NOTE — PROGRESS NOTE ADULT - PROBLEM SELECTOR PLAN 1
phosphorus level, PTH low ,   PTHRP wnl  vitamin d level wnl ( low normal)  ruled out hyperthyroid check tsh wnl  free t4 wnl   spep and upep noted no m spike  no bence israel protein    endocrine consult reviewed and appreciated s/p Aredia on 12/11/19 12/13/19 obtained ct chest abd pelvis ? lobular stomach therefore consulted gi plan for egd on Monday 12/14/19 consulted heme/onc and recc for BM biopsy daughters agree but ask can be done on Tuesday after egd, 89 associated with anemia unspecified possible lymphoproliferative disorder   PTH low ,   PTHRP wnl  vitamin d level wnl ( low normal)  ruled out hyperthyroid check tsh wnl  free t4 wnl   spep and upep noted: no m spike ,  no bence israel protein    endocrine consult reviewed and appreciated s/p Aredia on 12/11/19 12/13/19 obtained ct chest abd pelvis ? lobular stomach therefore consulted gi plan for egd on Monday 12/16/19 12/14/19 consulted heme/onc and recc for BM biopsy daughters agree but ask can be done on Tuesday after egd,

## 2021-12-30 NOTE — PATIENT PROFILE ADULT - JOB HELP
no Calcipotriene Pregnancy And Lactation Text: This medication has not been proven safe during pregnancy. It is unknown if this medication is excreted in breast milk.

## 2022-01-13 NOTE — PHYSICAL THERAPY INITIAL EVALUATION ADULT - PLANNED THERAPY INTERVENTIONS, PT EVAL
Body Location Override (Optional - Billing Will Still Be Based On Selected Body Map Location If Applicable): throughout body Body Location Override (Optional - Billing Will Still Be Based On Selected Body Map Location If Applicable): body throughout Detail Level: Detailed Size Of Lesion In Cm (Optional): 0 balance training/transfer training/gait training/bed mobility training/strengthening

## 2023-02-08 NOTE — PHYSICAL THERAPY INITIAL EVALUATION ADULT - GAIT TRAINING, PT EVAL
supervision Independent in ambulation with use of RW device up to 100 feet observing proper gait pattern, posture and use of walking device for 3-4 weeks safely.

## 2023-04-22 NOTE — PROGRESS NOTE ADULT - PROBLEM SELECTOR PROBLEM 8
Acute on chronic diastolic congestive heart failure <-- Click to add NO significant Past Surgical History

## 2024-07-13 NOTE — ED ADULT NURSE NOTE - CHIEF COMPLAINT
DEC attempted to assess patient. Patient refused stating she didn't want the ipad near her. This writer overheard patient telling ED staff to let her go so she can go jump off of a bridge.    Staff informed to contact DEC when patient is ready to participate.    Brent Miguel, LÁZAROSW  7/13/2024  7:34 AM     The patient is a 89y Female complaining of weakness.

## 2024-11-18 NOTE — PATIENT PROFILE ADULT - NSPROIMPLANTSMEDDEV_GEN_A_NUR
Third attempt to call patient regarding negative carrier screening results. Voicemail was not set-up. Unable to leave a message.     Called with  \"Martín\" ID#[id number].     Sue Kessler MS [facility]  Prenatal Genetics         
None